# Patient Record
Sex: FEMALE | Race: WHITE | NOT HISPANIC OR LATINO | Employment: OTHER | URBAN - METROPOLITAN AREA
[De-identification: names, ages, dates, MRNs, and addresses within clinical notes are randomized per-mention and may not be internally consistent; named-entity substitution may affect disease eponyms.]

---

## 2017-08-23 ENCOUNTER — OFFICE VISIT (OUTPATIENT)
Dept: PRIMARY CARE CLINIC | Facility: CLINIC | Age: 67
End: 2017-08-23
Payer: COMMERCIAL

## 2017-08-23 VITALS
HEART RATE: 74 BPM | OXYGEN SATURATION: 98 % | BODY MASS INDEX: 16.87 KG/M2 | HEIGHT: 61 IN | TEMPERATURE: 98 F | DIASTOLIC BLOOD PRESSURE: 84 MMHG | WEIGHT: 89.38 LBS | RESPIRATION RATE: 18 BRPM | SYSTOLIC BLOOD PRESSURE: 140 MMHG

## 2017-08-23 DIAGNOSIS — I25.10 CORONARY ARTERY DISEASE, ANGINA PRESENCE UNSPECIFIED, UNSPECIFIED VESSEL OR LESION TYPE, UNSPECIFIED WHETHER NATIVE OR TRANSPLANTED HEART: ICD-10-CM

## 2017-08-23 DIAGNOSIS — J44.9 CHRONIC OBSTRUCTIVE PULMONARY DISEASE, UNSPECIFIED COPD TYPE: ICD-10-CM

## 2017-08-23 DIAGNOSIS — R51.9 HEADACHE, UNSPECIFIED HEADACHE TYPE: ICD-10-CM

## 2017-08-23 DIAGNOSIS — N18.9 CRI (CHRONIC RENAL INSUFFICIENCY), UNSPECIFIED STAGE: ICD-10-CM

## 2017-08-23 DIAGNOSIS — D64.9 ANEMIA, UNSPECIFIED TYPE: Primary | ICD-10-CM

## 2017-08-23 PROCEDURE — 99213 OFFICE O/P EST LOW 20 MIN: CPT | Mod: 25,S$GLB,, | Performed by: INTERNAL MEDICINE

## 2017-08-23 PROCEDURE — 3077F SYST BP >= 140 MM HG: CPT | Mod: S$GLB,,, | Performed by: INTERNAL MEDICINE

## 2017-08-23 PROCEDURE — 1125F AMNT PAIN NOTED PAIN PRSNT: CPT | Mod: S$GLB,,, | Performed by: INTERNAL MEDICINE

## 2017-08-23 PROCEDURE — 96372 THER/PROPH/DIAG INJ SC/IM: CPT | Mod: S$GLB,,, | Performed by: INTERNAL MEDICINE

## 2017-08-23 PROCEDURE — 1159F MED LIST DOCD IN RCRD: CPT | Mod: S$GLB,,, | Performed by: INTERNAL MEDICINE

## 2017-08-23 PROCEDURE — 3079F DIAST BP 80-89 MM HG: CPT | Mod: S$GLB,,, | Performed by: INTERNAL MEDICINE

## 2017-08-23 PROCEDURE — 3008F BODY MASS INDEX DOCD: CPT | Mod: S$GLB,,, | Performed by: INTERNAL MEDICINE

## 2017-08-23 RX ORDER — ATORVASTATIN CALCIUM 20 MG/1
TABLET, FILM COATED ORAL
COMMUNITY
Start: 2017-08-17 | End: 2017-11-21 | Stop reason: SDUPTHER

## 2017-08-23 RX ORDER — CYANOCOBALAMIN 1000 UG/ML
2000 INJECTION, SOLUTION INTRAMUSCULAR; SUBCUTANEOUS ONCE
Status: COMPLETED | OUTPATIENT
Start: 2017-08-23 | End: 2017-08-23

## 2017-08-23 RX ORDER — CYANOCOBALAMIN 1000 UG/ML
2000 INJECTION, SOLUTION INTRAMUSCULAR; SUBCUTANEOUS ONCE
Status: DISCONTINUED | OUTPATIENT
Start: 2017-08-23 | End: 2017-08-23

## 2017-08-23 RX ORDER — HYDRALAZINE HYDROCHLORIDE 100 MG/1
TABLET, FILM COATED ORAL
COMMUNITY
Start: 2017-08-11 | End: 2017-08-23

## 2017-08-23 RX ORDER — PANTOPRAZOLE SODIUM 40 MG/1
TABLET, DELAYED RELEASE ORAL
COMMUNITY
Start: 2017-06-29 | End: 2017-10-02 | Stop reason: SDUPTHER

## 2017-08-23 RX ORDER — METOPROLOL SUCCINATE 25 MG/1
TABLET, EXTENDED RELEASE ORAL
COMMUNITY
Start: 2017-08-08 | End: 2017-11-23 | Stop reason: SDUPTHER

## 2017-08-23 RX ORDER — AMLODIPINE BESYLATE 10 MG/1
TABLET ORAL
COMMUNITY
Start: 2017-08-11 | End: 2017-11-21 | Stop reason: SDUPTHER

## 2017-08-23 RX ORDER — HYDRALAZINE HYDROCHLORIDE 50 MG/1
50 TABLET, FILM COATED ORAL 3 TIMES DAILY
Status: ON HOLD | COMMUNITY
End: 2020-06-15 | Stop reason: HOSPADM

## 2017-08-23 RX ORDER — PAROXETINE HYDROCHLORIDE 20 MG/1
20 TABLET, FILM COATED ORAL EVERY MORNING
Qty: 90 TABLET | Refills: 3 | Status: SHIPPED | OUTPATIENT
Start: 2017-08-23 | End: 2018-08-23 | Stop reason: SDUPTHER

## 2017-08-23 RX ORDER — CLOPIDOGREL BISULFATE 75 MG/1
TABLET ORAL
COMMUNITY
Start: 2017-07-17 | End: 2017-08-23

## 2017-08-23 RX ORDER — BUTALBITAL, ACETAMINOPHEN AND CAFFEINE 50; 325; 40 MG/1; MG/1; MG/1
TABLET ORAL
Refills: 1 | COMMUNITY
Start: 2017-05-26 | End: 2017-11-21

## 2017-08-23 RX ORDER — SUCRALFATE 1 G/1
TABLET ORAL
COMMUNITY
Start: 2017-08-11 | End: 2017-09-21 | Stop reason: SDUPTHER

## 2017-08-23 RX ORDER — CLONIDINE HYDROCHLORIDE 0.1 MG/1
TABLET ORAL
COMMUNITY
Start: 2017-07-24 | End: 2017-11-21 | Stop reason: SDUPTHER

## 2017-08-23 RX ORDER — BUTALBITAL, ACETAMINOPHEN AND CAFFEINE 50; 325; 40 MG/1; MG/1; MG/1
TABLET ORAL
Refills: 1 | Status: CANCELLED | OUTPATIENT
Start: 2017-08-23

## 2017-08-23 RX ADMIN — CYANOCOBALAMIN 2000 MCG: 1000 INJECTION, SOLUTION INTRAMUSCULAR; SUBCUTANEOUS at 05:08

## 2017-08-24 NOTE — PROGRESS NOTES
Subjective:       Patient ID: Radha Jurado is a 67 y.o. female.    Chief Complaint: Hospital Follow Up    HPI  Pt was in hospital last week severe anemia had 4 unit of PRBC was very weak Hct 12%  Thelma now no obvious sorce of blood loss wynne sappt with DR Ramirez [facility] need repeat lab ssee if anemic again still weak not back to herself yet  Review of Systems    Objective:      Physical Exam   Constitutional: She is oriented to person, place, and time. She appears well-developed and well-nourished. No distress.   Skinny thin but ambulating with cane well   HENT:   Head: Normocephalic and atraumatic.   Right Ear: External ear normal.   Left Ear: External ear normal.   Nose: Nose normal.   Mouth/Throat: Oropharynx is clear and moist. No oropharyngeal exudate.   Eyes: Conjunctivae and EOM are normal. Pupils are equal, round, and reactive to light. Right eye exhibits no discharge. Left eye exhibits no discharge.   Neck: Normal range of motion. Neck supple. No thyromegaly present.   Cardiovascular: Normal rate, regular rhythm, normal heart sounds and intact distal pulses.  Exam reveals no gallop and no friction rub.    No murmur heard.  Pulmonary/Chest: Effort normal and breath sounds normal. No respiratory distress. She has no wheezes. She has no rales. She exhibits no tenderness.   Abdominal: Soft. Bowel sounds are normal. She exhibits no distension. There is no tenderness. There is no rebound and no guarding.   Musculoskeletal: Normal range of motion. She exhibits no edema, tenderness or deformity.   Lymphadenopathy:     She has no cervical adenopathy.   Neurological: She is alert and oriented to person, place, and time.   Skin: Skin is warm and dry. Capillary refill takes less than 2 seconds. No rash noted. No erythema.   Psychiatric: She has a normal mood and affect. Judgment and thought content normal.   Nursing note and vitals reviewed.      Assessment:       1. Anemia, unspecified type    2. CRI (chronic renal  insufficiency), unspecified stage    3. Coronary artery disease, angina presence unspecified, unspecified vessel or lesion type, unspecified whether native or transplanted heart    4. Chronic obstructive pulmonary disease, unspecified COPD type    5. Headache, unspecified headache type        Plan:       Anemia, unspecified type  -     CBC auto differential; Future; Expected date: 08/23/2017  -     Ambulatory Referral to Gastroenterology    CRI (chronic renal insufficiency), unspecified stage  -     Comprehensive metabolic panel; Future; Expected date: 08/23/2017    Coronary artery disease, angina presence unspecified, unspecified vessel or lesion type, unspecified whether native or transplanted heart    Chronic obstructive pulmonary disease, unspecified COPD type    Headache, unspecified headache type  -     paroxetine (PAXIL) 20 MG tablet; Take 1 tablet (20 mg total) by mouth every morning.  Dispense: 90 tablet; Refill: 3    Other orders  -     Cancel: butalbital-acetaminophen-caffeine -40 mg (FIORICET, ESGIC) -40 mg per tablet; ; Refill: 1  -     cyanocobalamin injection 2,000 mcg; Inject 2 mLs (2,000 mcg total) into the muscle once.  -     Discontinue: cyanocobalamin injection 2,000 mcg; Inject 2 mLs (2,000 mcg total) into the muscle once.

## 2017-08-31 ENCOUNTER — TELEPHONE (OUTPATIENT)
Dept: PRIMARY CARE CLINIC | Facility: CLINIC | Age: 67
End: 2017-08-31

## 2017-08-31 NOTE — TELEPHONE ENCOUNTER
Attempted to call patient phone rings and rings never answers.. Other number is no longer in service

## 2017-09-05 ENCOUNTER — TELEPHONE (OUTPATIENT)
Dept: PRIMARY CARE CLINIC | Facility: CLINIC | Age: 67
End: 2017-09-05

## 2017-09-22 RX ORDER — SUCRALFATE 1 G/1
TABLET ORAL
Qty: 90 TABLET | Refills: 0 | Status: SHIPPED | OUTPATIENT
Start: 2017-09-22 | End: 2017-11-17 | Stop reason: SDUPTHER

## 2017-10-02 RX ORDER — PANTOPRAZOLE SODIUM 40 MG/1
TABLET, DELAYED RELEASE ORAL
Qty: 90 TABLET | Refills: 3 | Status: SHIPPED | OUTPATIENT
Start: 2017-10-02 | End: 2018-08-23 | Stop reason: SDUPTHER

## 2017-11-19 RX ORDER — SUCRALFATE 1 G/1
TABLET ORAL
Qty: 90 TABLET | Refills: 0 | Status: SHIPPED | OUTPATIENT
Start: 2017-11-19 | End: 2017-11-21 | Stop reason: SDUPTHER

## 2017-11-20 ENCOUNTER — TELEPHONE (OUTPATIENT)
Dept: PRIMARY CARE CLINIC | Facility: CLINIC | Age: 67
End: 2017-11-20

## 2017-11-20 NOTE — TELEPHONE ENCOUNTER
----- Message from Candy Oconnor sent at 11/20/2017 11:31 AM CST -----  Contact: Daughter  Viviana, daughter [phone], Calling for same day appointment, not feeling well. Please advise. Thanks.

## 2017-11-21 ENCOUNTER — OFFICE VISIT (OUTPATIENT)
Dept: PRIMARY CARE CLINIC | Facility: CLINIC | Age: 67
End: 2017-11-21
Payer: COMMERCIAL

## 2017-11-21 VITALS
BODY MASS INDEX: 16.62 KG/M2 | DIASTOLIC BLOOD PRESSURE: 59 MMHG | WEIGHT: 88 LBS | RESPIRATION RATE: 17 BRPM | HEIGHT: 61 IN | HEART RATE: 52 BPM | SYSTOLIC BLOOD PRESSURE: 143 MMHG | OXYGEN SATURATION: 97 %

## 2017-11-21 DIAGNOSIS — R62.7 FAILURE TO THRIVE IN ADULT: ICD-10-CM

## 2017-11-21 DIAGNOSIS — I10 ESSENTIAL HYPERTENSION: ICD-10-CM

## 2017-11-21 DIAGNOSIS — N30.00 ACUTE CYSTITIS WITHOUT HEMATURIA: ICD-10-CM

## 2017-11-21 DIAGNOSIS — J44.9 CHRONIC OBSTRUCTIVE PULMONARY DISEASE, UNSPECIFIED COPD TYPE: ICD-10-CM

## 2017-11-21 DIAGNOSIS — N18.4 CHRONIC RENAL IMPAIRMENT, STAGE 4 (SEVERE): Primary | ICD-10-CM

## 2017-11-21 DIAGNOSIS — G44.209 TENSION-TYPE HEADACHE, NOT INTRACTABLE, UNSPECIFIED CHRONICITY PATTERN: ICD-10-CM

## 2017-11-21 DIAGNOSIS — Z23 NEED FOR IMMUNIZATION AGAINST INFLUENZA: ICD-10-CM

## 2017-11-21 DIAGNOSIS — E78.5 HYPERLIPIDEMIA, UNSPECIFIED HYPERLIPIDEMIA TYPE: ICD-10-CM

## 2017-11-21 DIAGNOSIS — K21.9 GASTROESOPHAGEAL REFLUX DISEASE, ESOPHAGITIS PRESENCE NOT SPECIFIED: ICD-10-CM

## 2017-11-21 PROCEDURE — 96372 THER/PROPH/DIAG INJ SC/IM: CPT | Mod: 59,S$GLB,, | Performed by: INTERNAL MEDICINE

## 2017-11-21 PROCEDURE — 90662 IIV NO PRSV INCREASED AG IM: CPT | Mod: S$GLB,,, | Performed by: INTERNAL MEDICINE

## 2017-11-21 PROCEDURE — 99999 PR PBB SHADOW E&M-EST. PATIENT-LVL III: CPT | Mod: PBBFAC,,, | Performed by: INTERNAL MEDICINE

## 2017-11-21 PROCEDURE — 99213 OFFICE O/P EST LOW 20 MIN: CPT | Mod: 25,S$GLB,, | Performed by: INTERNAL MEDICINE

## 2017-11-21 PROCEDURE — G0008 ADMIN INFLUENZA VIRUS VAC: HCPCS | Mod: S$GLB,,, | Performed by: INTERNAL MEDICINE

## 2017-11-21 RX ORDER — ATORVASTATIN CALCIUM 20 MG/1
20 TABLET, FILM COATED ORAL DAILY
Qty: 90 TABLET | Refills: 3 | Status: SHIPPED | OUTPATIENT
Start: 2017-11-21 | End: 2018-03-07 | Stop reason: SDUPTHER

## 2017-11-21 RX ORDER — AMLODIPINE BESYLATE 10 MG/1
10 TABLET ORAL DAILY
Qty: 90 TABLET | Refills: 3 | Status: SHIPPED | OUTPATIENT
Start: 2017-11-21 | End: 2018-03-07 | Stop reason: SDUPTHER

## 2017-11-21 RX ORDER — CLONIDINE HYDROCHLORIDE 0.1 MG/1
0.1 TABLET ORAL 2 TIMES DAILY
Qty: 180 TABLET | Refills: 3 | Status: SHIPPED | OUTPATIENT
Start: 2017-11-21 | End: 2017-11-23 | Stop reason: SDUPTHER

## 2017-11-21 RX ORDER — SUCRALFATE 1 G/1
TABLET ORAL
Qty: 90 TABLET | Refills: 0 | Status: SHIPPED | OUTPATIENT
Start: 2017-11-21 | End: 2018-03-07 | Stop reason: SDUPTHER

## 2017-11-21 RX ORDER — CYANOCOBALAMIN 1000 UG/ML
2000 INJECTION, SOLUTION INTRAMUSCULAR; SUBCUTANEOUS
Status: COMPLETED | OUTPATIENT
Start: 2017-11-21 | End: 2017-11-21

## 2017-11-21 RX ORDER — CEFTRIAXONE 500 MG/1
500 INJECTION, POWDER, FOR SOLUTION INTRAMUSCULAR; INTRAVENOUS
Status: COMPLETED | OUTPATIENT
Start: 2017-11-21 | End: 2017-11-21

## 2017-11-21 RX ADMIN — CEFTRIAXONE 500 MG: 500 INJECTION, POWDER, FOR SOLUTION INTRAMUSCULAR; INTRAVENOUS at 01:11

## 2017-11-21 RX ADMIN — CYANOCOBALAMIN 2000 MCG: 1000 INJECTION, SOLUTION INTRAMUSCULAR; SUBCUTANEOUS at 01:11

## 2017-11-23 RX ORDER — CIPROFLOXACIN 250 MG/1
250 TABLET, FILM COATED ORAL 2 TIMES DAILY
Qty: 14 TABLET | Refills: 0
Start: 2017-11-23 | End: 2018-02-21 | Stop reason: ALTCHOICE

## 2017-11-23 RX ORDER — BUTALBITAL, ACETAMINOPHEN AND CAFFEINE 50; 325; 40 MG/1; MG/1; MG/1
1 TABLET ORAL EVERY 6 HOURS PRN
Qty: 30 TABLET | Refills: 1
Start: 2017-11-23 | End: 2018-03-07 | Stop reason: SDUPTHER

## 2017-11-23 RX ORDER — CLONIDINE HYDROCHLORIDE 0.1 MG/1
0.1 TABLET ORAL 2 TIMES DAILY
Qty: 180 TABLET | Refills: 3
Start: 2017-11-23 | End: 2018-03-07 | Stop reason: SDUPTHER

## 2017-11-23 RX ORDER — METOPROLOL SUCCINATE 25 MG/1
25 TABLET, EXTENDED RELEASE ORAL DAILY
Qty: 90 TABLET | Refills: 3
Start: 2017-11-23 | End: 2018-03-07 | Stop reason: SDUPTHER

## 2017-11-23 NOTE — PROGRESS NOTES
Subjective:       Patient ID: Radha Jurado is a 67 y.o. female.    Chief Complaint: Annual Exam (no complaints at this time)    HPI    Patient is here for follow-up no clinical symptoms except chronic anxiety and headaches patient is currently follow-up closely wi for chronic renal insufficiency insufficiency near dialysis also weight loss but stable on the and she'll supplement patient had blood tests up frequently at nephrology clinic  Review of Systems    Objective:      Physical Exam   Constitutional: She is oriented to person, place, and time. She appears well-developed and well-nourished. No distress.   Pain and Headache but no distress still very active   HENT:   Head: Normocephalic and atraumatic.   Right Ear: External ear normal.   Left Ear: External ear normal.   Nose: Nose normal.   Mouth/Throat: Oropharynx is clear and moist. No oropharyngeal exudate.   Eyes: Conjunctivae and EOM are normal. Pupils are equal, round, and reactive to light. Right eye exhibits no discharge. Left eye exhibits no discharge.   Neck: Normal range of motion. Neck supple. No thyromegaly present.   Cardiovascular: Normal rate, regular rhythm, normal heart sounds and intact distal pulses.  Exam reveals no gallop and no friction rub.    No murmur heard.  Pulmonary/Chest: Effort normal and breath sounds normal. No respiratory distress. She has no wheezes. She has no rales. She exhibits no tenderness.   Abdominal: Soft. Bowel sounds are normal. She exhibits no distension. There is no tenderness. There is no rebound and no guarding.   Musculoskeletal: Normal range of motion. She exhibits no edema, tenderness or deformity.   Lymphadenopathy:     She has no cervical adenopathy.   Neurological: She is alert and oriented to person, place, and time.   Skin: Skin is warm and dry. Capillary refill takes less than 2 seconds. No rash noted. No erythema.   Psychiatric: She has a normal mood and affect. Judgment and thought content normal.    Nursing note and vitals reviewed.      Assessment:       1. Chronic renal impairment, stage 4 (severe)    2. Acute cystitis without hematuria    3. Tension-type headache, not intractable, unspecified chronicity pattern    4. Chronic obstructive pulmonary disease, unspecified COPD type    5. Essential hypertension    6. Gastroesophageal reflux disease, esophagitis presence not specified    7. Hyperlipidemia, unspecified hyperlipidemia type    8. Need for immunization against influenza    9. Failure to thrive in adult        Plan:       Chronic renal impairment, stage 4 (severe)  -     cyanocobalamin injection 2,000 mcg; Inject 2 mLs (2,000 mcg total) into the muscle one time.    Acute cystitis without hematuria  -     cefTRIAXone injection 500 mg; Inject 0.5 g (500 mg total) into the muscle one time.  -     ciprofloxacin HCl (CIPRO) 250 MG tablet; Take 1 tablet (250 mg total) by mouth 2 (two) times daily.  Dispense: 14 tablet; Refill: 0    Tension-type headache, not intractable, unspecified chronicity pattern  -     Discontinue: cloNIDine (CATAPRES) 0.1 MG tablet; Take 1 tablet (0.1 mg total) by mouth 2 (two) times daily.  Dispense: 180 tablet; Refill: 3  -     butalbital-acetaminophen-caffeine -40 mg (FIORICET, ESGIC) -40 mg per tablet; Take 1 tablet by mouth every 6 (six) hours as needed for Pain.  Dispense: 30 tablet; Refill: 1    Chronic obstructive pulmonary disease, unspecified COPD type  -     X-Ray Chest PA And Lateral; Future; Expected date: 11/23/2017    Essential hypertension  -     amLODIPine (NORVASC) 10 MG tablet; Take 1 tablet (10 mg total) by mouth once daily.  Dispense: 90 tablet; Refill: 3  -     cloNIDine (CATAPRES) 0.1 MG tablet; Take 1 tablet (0.1 mg total) by mouth 2 (two) times daily.  Dispense: 180 tablet; Refill: 3  -     metoprolol succinate (TOPROL-XL) 25 MG 24 hr tablet; Take 1 tablet (25 mg total) by mouth once daily.  Dispense: 90 tablet; Refill: 3  -     CBC auto  differential; Future; Expected date: 11/23/2017  -     Comprehensive metabolic panel; Future; Expected date: 11/23/2017  -     Lipid panel; Future; Expected date: 11/23/2017    Gastroesophageal reflux disease, esophagitis presence not specified  -     sucralfate (CARAFATE) 1 gram tablet; TAKE ONE TABLET BY MOUTH THREE TIMES DAILY BEFORE MEALS AND BEDTIME  Dispense: 90 tablet; Refill: 0    Hyperlipidemia, unspecified hyperlipidemia type  -     atorvastatin (LIPITOR) 20 MG tablet; Take 1 tablet (20 mg total) by mouth once daily.  Dispense: 90 tablet; Refill: 3    Need for immunization against influenza  -     Influenza - High Dose (65+) (PF) (IM)    Failure to thrive in adult  -     TSH; Future; Expected date: 11/23/2017  -     T4, free; Future; Expected date: 11/23/2017

## 2018-02-21 ENCOUNTER — OFFICE VISIT (OUTPATIENT)
Dept: PRIMARY CARE CLINIC | Facility: CLINIC | Age: 68
End: 2018-02-21
Payer: MEDICARE

## 2018-02-21 VITALS
HEIGHT: 61 IN | OXYGEN SATURATION: 91 % | HEART RATE: 62 BPM | SYSTOLIC BLOOD PRESSURE: 102 MMHG | RESPIRATION RATE: 18 BRPM | DIASTOLIC BLOOD PRESSURE: 68 MMHG | BODY MASS INDEX: 17.91 KG/M2 | TEMPERATURE: 98 F | WEIGHT: 94.88 LBS

## 2018-02-21 DIAGNOSIS — J44.9 CHRONIC OBSTRUCTIVE PULMONARY DISEASE, UNSPECIFIED COPD TYPE: ICD-10-CM

## 2018-02-21 DIAGNOSIS — R62.7 FAILURE TO THRIVE IN ADULT: ICD-10-CM

## 2018-02-21 DIAGNOSIS — R23.1 PALE SKIN: ICD-10-CM

## 2018-02-21 DIAGNOSIS — J40 BRONCHITIS: ICD-10-CM

## 2018-02-21 DIAGNOSIS — R55 SYNCOPE, UNSPECIFIED SYNCOPE TYPE: Primary | ICD-10-CM

## 2018-02-21 PROBLEM — R06.02 SOB (SHORTNESS OF BREATH): Status: ACTIVE | Noted: 2018-02-21

## 2018-02-21 PROCEDURE — 99999 PR PBB SHADOW E&M-EST. PATIENT-LVL III: CPT | Mod: PBBFAC,,, | Performed by: INTERNAL MEDICINE

## 2018-02-21 PROCEDURE — 1159F MED LIST DOCD IN RCRD: CPT | Mod: S$GLB,,, | Performed by: INTERNAL MEDICINE

## 2018-02-21 PROCEDURE — 1126F AMNT PAIN NOTED NONE PRSNT: CPT | Mod: S$GLB,,, | Performed by: INTERNAL MEDICINE

## 2018-02-21 PROCEDURE — 99213 OFFICE O/P EST LOW 20 MIN: CPT | Mod: S$GLB,,, | Performed by: INTERNAL MEDICINE

## 2018-02-21 PROCEDURE — 3008F BODY MASS INDEX DOCD: CPT | Mod: S$GLB,,, | Performed by: INTERNAL MEDICINE

## 2018-02-22 PROBLEM — E44.1 MALNUTRITION OF MILD DEGREE: Status: ACTIVE | Noted: 2018-02-22

## 2018-02-22 PROBLEM — J44.1 COPD EXACERBATION: Status: ACTIVE | Noted: 2018-02-21

## 2018-02-22 PROBLEM — E44.1 MALNUTRITION OF MILD DEGREE: Chronic | Status: ACTIVE | Noted: 2018-02-22

## 2018-02-22 NOTE — PROGRESS NOTES
Subjective:       Patient ID: Radha Jurado is a 67 y.o. female.    Chief Complaint: Possible Pneumonia and Extremity Weakness    HPI    Pt c/o weakness and coughing thick yellow sputum and keep passing out at home according to spouse has not had any labs since last visit seen nephrologist told kidney weak near dialyses pt appera pale and weak and wt loss send to ER for further evaluation  Review of Systems    Objective:      Physical Exam   Constitutional: She is oriented to person, place, and time. She appears well-developed and well-nourished. No distress.   Weak thin coughing   HENT:   Head: Normocephalic and atraumatic.   Right Ear: External ear normal.   Left Ear: External ear normal.   Nose: Nose normal.   Mouth/Throat: Oropharynx is clear and moist. No oropharyngeal exudate.   Eyes: Conjunctivae and EOM are normal. Pupils are equal, round, and reactive to light. Right eye exhibits no discharge. Left eye exhibits no discharge.   Neck: Normal range of motion. Neck supple. No thyromegaly present.   Cardiovascular: Normal rate, regular rhythm, normal heart sounds and intact distal pulses.  Exam reveals no gallop and no friction rub.    No murmur heard.  Pulmonary/Chest: Effort normal. No respiratory distress. She has no wheezes. She has rales (crackles LLL). She exhibits no tenderness.   Abdominal: Soft. Bowel sounds are normal. She exhibits no distension. There is no tenderness. There is no rebound and no guarding.   Musculoskeletal: Normal range of motion. She exhibits no edema, tenderness or deformity.   Lymphadenopathy:     She has no cervical adenopathy.   Neurological: She is alert and oriented to person, place, and time.   Skin: Skin is warm and dry. Capillary refill takes less than 2 seconds. No rash noted. No erythema. There is pallor.   pale   Psychiatric: She has a normal mood and affect. Judgment and thought content normal.   Nursing note and vitals reviewed.      Assessment:       1. Syncope,  unspecified syncope type    2. Bronchitis    3. Chronic obstructive pulmonary disease, unspecified COPD type    4. Failure to thrive in adult    5. Pale skin        Plan:       Syncope, unspecified syncope type  Comments:  send to ERfor further evaluation severe anemia recurrent syncopy pneumonia    Bronchitis    Chronic obstructive pulmonary disease, unspecified COPD type    Failure to thrive in adult    Pale skin

## 2018-03-07 ENCOUNTER — OFFICE VISIT (OUTPATIENT)
Dept: PRIMARY CARE CLINIC | Facility: CLINIC | Age: 68
End: 2018-03-07
Payer: MEDICARE

## 2018-03-07 VITALS
OXYGEN SATURATION: 98 % | DIASTOLIC BLOOD PRESSURE: 77 MMHG | BODY MASS INDEX: 18.06 KG/M2 | SYSTOLIC BLOOD PRESSURE: 217 MMHG | WEIGHT: 95.63 LBS | HEART RATE: 62 BPM | HEIGHT: 61 IN | RESPIRATION RATE: 18 BRPM | TEMPERATURE: 98 F

## 2018-03-07 DIAGNOSIS — K21.9 GASTROESOPHAGEAL REFLUX DISEASE, ESOPHAGITIS PRESENCE NOT SPECIFIED: ICD-10-CM

## 2018-03-07 DIAGNOSIS — N18.30 CHRONIC KIDNEY DISEASE (CKD), STAGE III (MODERATE): Primary | ICD-10-CM

## 2018-03-07 DIAGNOSIS — I10 ESSENTIAL HYPERTENSION: ICD-10-CM

## 2018-03-07 DIAGNOSIS — R51.9 NONINTRACTABLE HEADACHE, UNSPECIFIED CHRONICITY PATTERN, UNSPECIFIED HEADACHE TYPE: ICD-10-CM

## 2018-03-07 DIAGNOSIS — G44.209 TENSION-TYPE HEADACHE, NOT INTRACTABLE, UNSPECIFIED CHRONICITY PATTERN: ICD-10-CM

## 2018-03-07 DIAGNOSIS — E78.5 HYPERLIPIDEMIA, UNSPECIFIED HYPERLIPIDEMIA TYPE: ICD-10-CM

## 2018-03-07 PROCEDURE — 99213 OFFICE O/P EST LOW 20 MIN: CPT | Mod: S$GLB,,, | Performed by: INTERNAL MEDICINE

## 2018-03-07 PROCEDURE — 99999 PR PBB SHADOW E&M-EST. PATIENT-LVL IV: CPT | Mod: PBBFAC,,, | Performed by: INTERNAL MEDICINE

## 2018-03-07 RX ORDER — AMLODIPINE BESYLATE 10 MG/1
10 TABLET ORAL DAILY
Qty: 90 TABLET | Refills: 3 | Status: SHIPPED | OUTPATIENT
Start: 2018-03-07 | End: 2019-02-14

## 2018-03-07 RX ORDER — CLONIDINE HYDROCHLORIDE 0.1 MG/1
0.1 TABLET ORAL 2 TIMES DAILY
Qty: 180 TABLET | Refills: 3 | Status: ON HOLD | OUTPATIENT
Start: 2018-03-07 | End: 2018-08-27 | Stop reason: HOSPADM

## 2018-03-07 RX ORDER — SUCRALFATE 1 G/1
TABLET ORAL
Qty: 90 TABLET | Refills: 3 | Status: SHIPPED | OUTPATIENT
Start: 2018-03-07 | End: 2019-04-10 | Stop reason: SDUPTHER

## 2018-03-07 RX ORDER — ATORVASTATIN CALCIUM 20 MG/1
20 TABLET, FILM COATED ORAL DAILY
Qty: 90 TABLET | Refills: 3 | Status: SHIPPED | OUTPATIENT
Start: 2018-03-07 | End: 2018-08-23 | Stop reason: SDUPTHER

## 2018-03-07 RX ORDER — BUTALBITAL, ACETAMINOPHEN AND CAFFEINE 50; 325; 40 MG/1; MG/1; MG/1
1 TABLET ORAL EVERY 6 HOURS PRN
Qty: 30 TABLET | Refills: 1 | Status: SHIPPED | OUTPATIENT
Start: 2018-03-07 | End: 2018-05-23 | Stop reason: SDUPTHER

## 2018-03-07 RX ORDER — METOPROLOL SUCCINATE 25 MG/1
25 TABLET, EXTENDED RELEASE ORAL DAILY
Qty: 90 TABLET | Refills: 3 | Status: ON HOLD | OUTPATIENT
Start: 2018-03-07 | End: 2018-08-27 | Stop reason: HOSPADM

## 2018-03-08 ENCOUNTER — TELEPHONE (OUTPATIENT)
Dept: PRIMARY CARE CLINIC | Facility: CLINIC | Age: 68
End: 2018-03-08

## 2018-03-08 DIAGNOSIS — N18.5 CKD (CHRONIC KIDNEY DISEASE), STAGE V: Primary | ICD-10-CM

## 2018-03-08 NOTE — PROGRESS NOTES
Patient, Radha Jurado (MRN #[id number]), presented with a recent estimated Glumerular Filtration Rate (eGFR) less than 15 consistent with the definition of chronic kidney disease stage 5 (ICD10 - N18.5).    eGFR if non    Date Value Ref Range Status   02/22/2018 14.8 (A) >60 mL/min/1.73 m^2 Final     Comment:     Calculation used to obtain the estimated glomerular filtration  rate (eGFR) is the CKD-EPI equation.          The patient's chronic kidney disease stage 5 was monitored, evaluated, addressed and/or treated. This addendum to the medical record is made on 03/08/2018.

## 2018-03-08 NOTE — PROGRESS NOTES
Subjective:       Patient ID: Radha Jurado is a 67 y.o. female.    Chief Complaint: Hospital Follow Up    HPI  Pt was in hospital for 1 day for dehydration IVF told kidney getting worse need see nephrologist Dr Renteria don't take [employer] doing ok now no sob cp still urinating well need refill meds  Review of Systems    Objective:      Physical Exam   Constitutional: She is oriented to person, place, and time. She appears well-developed and well-nourished. No distress.   HENT:   Head: Normocephalic and atraumatic.   Right Ear: External ear normal.   Left Ear: External ear normal.   Nose: Nose normal.   Mouth/Throat: Oropharynx is clear and moist. No oropharyngeal exudate.   Eyes: Conjunctivae and EOM are normal. Pupils are equal, round, and reactive to light. Right eye exhibits no discharge. Left eye exhibits no discharge.   Neck: Normal range of motion. Neck supple. No thyromegaly present.   Cardiovascular: Normal rate, regular rhythm, normal heart sounds and intact distal pulses.  Exam reveals no gallop and no friction rub.    No murmur heard.  Pulmonary/Chest: Effort normal and breath sounds normal. No respiratory distress. She has no wheezes. She has no rales. She exhibits no tenderness.   Abdominal: Soft. Bowel sounds are normal. She exhibits no distension. There is no tenderness. There is no rebound and no guarding.   Musculoskeletal: Normal range of motion. She exhibits no edema, tenderness or deformity.   Lymphadenopathy:     She has no cervical adenopathy.   Neurological: She is alert and oriented to person, place, and time.   Skin: Skin is warm and dry. Capillary refill takes less than 2 seconds. No rash noted. No erythema.   Psychiatric: She has a normal mood and affect. Judgment and thought content normal.   Nursing note and vitals reviewed.      Assessment:       1. Chronic kidney disease (CKD), stage III (moderate)    2. Essential hypertension    3. Hyperlipidemia, unspecified hyperlipidemia  type    4. Tension-type headache, not intractable, unspecified chronicity pattern    5. Gastroesophageal reflux disease, esophagitis presence not specified    6. Nonintractable headache, unspecified chronicity pattern, unspecified headache type        Plan:       Chronic kidney disease (CKD), stage III (moderate)  -     Comprehensive metabolic panel; Future; Expected date: 03/07/2018    Essential hypertension  -     amLODIPine (NORVASC) 10 MG tablet; Take 1 tablet (10 mg total) by mouth once daily.  Dispense: 90 tablet; Refill: 3  -     metoprolol succinate (TOPROL-XL) 25 MG 24 hr tablet; Take 1 tablet (25 mg total) by mouth once daily.  Dispense: 90 tablet; Refill: 3  -     cloNIDine (CATAPRES) 0.1 MG tablet; Take 1 tablet (0.1 mg total) by mouth 2 (two) times daily.  Dispense: 180 tablet; Refill: 3  -     CBC auto differential; Future; Expected date: 03/07/2018    Hyperlipidemia, unspecified hyperlipidemia type  -     atorvastatin (LIPITOR) 20 MG tablet; Take 1 tablet (20 mg total) by mouth once daily.  Dispense: 90 tablet; Refill: 3    Tension-type headache, not intractable, unspecified chronicity pattern  -     butalbital-acetaminophen-caffeine -40 mg (FIORICET, ESGIC) -40 mg per tablet; Take 1 tablet by mouth every 6 (six) hours as needed for Pain.  Dispense: 30 tablet; Refill: 1    Gastroesophageal reflux disease, esophagitis presence not specified  -     sucralfate (CARAFATE) 1 gram tablet; TAKE ONE TABLET BY MOUTH THREE TIMES DAILY BEFORE MEALS AND BEDTIME  Dispense: 90 tablet; Refill: 3    Nonintractable headache, unspecified chronicity pattern, unspecified headache type

## 2018-03-08 NOTE — TELEPHONE ENCOUNTER
"Patient states "dr. Carpio was suppose to give me a referral for a kidney doctor here at [facility]." Verbalized to patient that we don't have nephrology here. Patient would like to see a Nephrologist at [facility] in [place]. Unable to find one. General referral placed. Pended to PCP.   "

## 2018-03-11 NOTE — TELEPHONE ENCOUNTER
Josh smith pt to any nephrologist who take [facility] seen Dr Ruby in hospital but don't take [facility]

## 2018-03-14 PROBLEM — N18.5 CKD (CHRONIC KIDNEY DISEASE), STAGE V: Status: ACTIVE | Noted: 2018-03-14

## 2018-03-22 ENCOUNTER — TELEPHONE (OUTPATIENT)
Dept: PRIMARY CARE CLINIC | Facility: CLINIC | Age: 68
End: 2018-03-22

## 2018-03-22 NOTE — TELEPHONE ENCOUNTER
----- Message from Paolo CARMEL Dietrich sent at 3/22/2018 12:47 PM CDT -----  Contact: Elvi/Dr. Ryan Boles called in regarding the attached patient and stated they received a referral from Dr. Carpio with no current lab report/results.  Fax number is [phone] and call back number is [phone]

## 2018-03-29 ENCOUNTER — TELEPHONE (OUTPATIENT)
Dept: PRIMARY CARE CLINIC | Facility: CLINIC | Age: 68
End: 2018-03-29

## 2018-03-29 NOTE — TELEPHONE ENCOUNTER
----- Message from Candy Oconnor sent at 3/29/2018  8:56 AM CDT -----  Contact: Elvi with Dr. Mann phone [phone] fax [phone]  Elvi with Dr. Mann phone [phone] fax [phone], Patient was referred to them, they needs copies of labs. Will not schedule patient until they receive all the records. Please fax. Thanks.

## 2018-04-11 ENCOUNTER — TELEPHONE (OUTPATIENT)
Dept: PRIMARY CARE CLINIC | Facility: CLINIC | Age: 68
End: 2018-04-11

## 2018-04-11 DIAGNOSIS — N18.30 STAGE 3 CHRONIC KIDNEY DISEASE: Primary | ICD-10-CM

## 2018-04-11 NOTE — TELEPHONE ENCOUNTER
Kristin with Dr. Mann's office is requesting for the referral to be changed from Dr. Hubbard to Dr. Mann's. Referral placed and signed per PCP.

## 2018-04-11 NOTE — TELEPHONE ENCOUNTER
----- Message from Marianna Sterling sent at 4/11/2018  8:51 AM CDT -----  Contact: Kristin with Dr. Mann's office [phone]  Kristin with Dr. Mann's office phone [phone], fax [phone] calling because a referral was placed to Dr. Aguilar, but patient will be seeing Dr. Mann and they will need a new referral faxed over. Kristin is requesting more than one visit for the referral. Please advise. Thanks!

## 2018-05-23 ENCOUNTER — CLINICAL SUPPORT (OUTPATIENT)
Dept: PRIMARY CARE CLINIC | Facility: CLINIC | Age: 68
End: 2018-05-23
Payer: MEDICARE

## 2018-05-23 ENCOUNTER — OFFICE VISIT (OUTPATIENT)
Dept: PRIMARY CARE CLINIC | Facility: CLINIC | Age: 68
End: 2018-05-23
Payer: MEDICARE

## 2018-05-23 VITALS
BODY MASS INDEX: 18.39 KG/M2 | HEIGHT: 61 IN | RESPIRATION RATE: 18 BRPM | DIASTOLIC BLOOD PRESSURE: 70 MMHG | TEMPERATURE: 98 F | OXYGEN SATURATION: 96 % | HEART RATE: 53 BPM | WEIGHT: 97.38 LBS | SYSTOLIC BLOOD PRESSURE: 143 MMHG

## 2018-05-23 DIAGNOSIS — J44.9 CHRONIC OBSTRUCTIVE PULMONARY DISEASE, UNSPECIFIED COPD TYPE: ICD-10-CM

## 2018-05-23 DIAGNOSIS — N18.4 CHRONIC RENAL FAILURE SYNDROME, STAGE 4 (SEVERE): ICD-10-CM

## 2018-05-23 DIAGNOSIS — G44.209 TENSION-TYPE HEADACHE, NOT INTRACTABLE, UNSPECIFIED CHRONICITY PATTERN: ICD-10-CM

## 2018-05-23 DIAGNOSIS — R53.83 FATIGUE, UNSPECIFIED TYPE: ICD-10-CM

## 2018-05-23 DIAGNOSIS — E78.5 HYPERLIPIDEMIA, UNSPECIFIED HYPERLIPIDEMIA TYPE: ICD-10-CM

## 2018-05-23 DIAGNOSIS — R63.4 WEIGHT LOSS: ICD-10-CM

## 2018-05-23 DIAGNOSIS — R63.0 LOSS OF APPETITE: Primary | ICD-10-CM

## 2018-05-23 DIAGNOSIS — Z12.31 ENCOUNTER FOR SCREENING MAMMOGRAM FOR BREAST CANCER: ICD-10-CM

## 2018-05-23 LAB
ALBUMIN SERPL BCP-MCNC: 3.7 G/DL
ALP SERPL-CCNC: 92 U/L
ALT SERPL W/O P-5'-P-CCNC: 20 U/L
ANION GAP SERPL CALC-SCNC: 10 MMOL/L
AST SERPL-CCNC: 22 U/L
BASOPHILS # BLD AUTO: 0 K/UL
BASOPHILS NFR BLD: 0.2 %
BILIRUB SERPL-MCNC: 0.5 MG/DL
BUN SERPL-MCNC: 67 MG/DL
CALCIUM SERPL-MCNC: 8.9 MG/DL
CHLORIDE SERPL-SCNC: 108 MMOL/L
CO2 SERPL-SCNC: 19 MMOL/L
CREAT SERPL-MCNC: 3.4 MG/DL
DIFFERENTIAL METHOD: ABNORMAL
EOSINOPHIL # BLD AUTO: 0.1 K/UL
EOSINOPHIL NFR BLD: 1.6 %
ERYTHROCYTE [DISTWIDTH] IN BLOOD BY AUTOMATED COUNT: 15.2 %
EST. GFR  (AFRICAN AMERICAN): 15.1 ML/MIN/1.73 M^2
EST. GFR  (NON AFRICAN AMERICAN): 13.1 ML/MIN/1.73 M^2
GLUCOSE SERPL-MCNC: 87 MG/DL
HCT VFR BLD AUTO: 39.2 %
HGB BLD-MCNC: 12.9 G/DL
LYMPHOCYTES # BLD AUTO: 1.7 K/UL
LYMPHOCYTES NFR BLD: 21 %
MCH RBC QN AUTO: 31.7 PG
MCHC RBC AUTO-ENTMCNC: 33 G/DL
MCV RBC AUTO: 96 FL
MONOCYTES # BLD AUTO: 0.6 K/UL
MONOCYTES NFR BLD: 7 %
NEUTROPHILS # BLD AUTO: 5.7 K/UL
NEUTROPHILS NFR BLD: 70.2 %
PLATELET # BLD AUTO: 174 K/UL
PMV BLD AUTO: 9.5 FL
POTASSIUM SERPL-SCNC: 4.5 MMOL/L
PROT SERPL-MCNC: 7.5 G/DL
RBC # BLD AUTO: 4.08 M/UL
SODIUM SERPL-SCNC: 137 MMOL/L
WBC # BLD AUTO: 8.1 K/UL

## 2018-05-23 PROCEDURE — 99999 PR PBB SHADOW E&M-EST. PATIENT-LVL II: CPT | Mod: PBBFAC,,,

## 2018-05-23 PROCEDURE — 99213 OFFICE O/P EST LOW 20 MIN: CPT | Mod: 25,S$GLB,, | Performed by: INTERNAL MEDICINE

## 2018-05-23 PROCEDURE — 3078F DIAST BP <80 MM HG: CPT | Mod: S$GLB,,, | Performed by: INTERNAL MEDICINE

## 2018-05-23 PROCEDURE — 96372 THER/PROPH/DIAG INJ SC/IM: CPT | Mod: S$GLB,,, | Performed by: INTERNAL MEDICINE

## 2018-05-23 PROCEDURE — 3077F SYST BP >= 140 MM HG: CPT | Mod: S$GLB,,, | Performed by: INTERNAL MEDICINE

## 2018-05-23 PROCEDURE — 99999 PR PBB SHADOW E&M-EST. PATIENT-LVL IV: CPT | Mod: PBBFAC,,, | Performed by: INTERNAL MEDICINE

## 2018-05-23 RX ORDER — CYANOCOBALAMIN 1000 UG/ML
2000 INJECTION, SOLUTION INTRAMUSCULAR; SUBCUTANEOUS
Status: COMPLETED | OUTPATIENT
Start: 2018-05-23 | End: 2018-05-23

## 2018-05-23 RX ORDER — BUTALBITAL, ACETAMINOPHEN AND CAFFEINE 50; 325; 40 MG/1; MG/1; MG/1
1 TABLET ORAL EVERY 6 HOURS PRN
Qty: 30 TABLET | Refills: 1 | Status: SHIPPED | OUTPATIENT
Start: 2018-05-23 | End: 2018-05-23 | Stop reason: SDUPTHER

## 2018-05-23 RX ORDER — ATORVASTATIN CALCIUM 20 MG/1
20 TABLET, FILM COATED ORAL DAILY
Qty: 90 TABLET | Refills: 3 | Status: CANCELLED | OUTPATIENT
Start: 2018-05-23 | End: 2019-05-23

## 2018-05-23 RX ORDER — IPRATROPIUM BROMIDE AND ALBUTEROL SULFATE 2.5; .5 MG/3ML; MG/3ML
3 SOLUTION RESPIRATORY (INHALATION)
Qty: 1 BOX | Refills: 0 | Status: SHIPPED | OUTPATIENT
Start: 2018-05-23 | End: 2018-05-23 | Stop reason: SDUPTHER

## 2018-05-23 RX ORDER — BUTALBITAL, ACETAMINOPHEN AND CAFFEINE 50; 325; 40 MG/1; MG/1; MG/1
1 TABLET ORAL EVERY 6 HOURS PRN
Qty: 30 TABLET | Refills: 1 | Status: SHIPPED | OUTPATIENT
Start: 2018-05-23 | End: 2018-08-23 | Stop reason: SDUPTHER

## 2018-05-23 RX ORDER — CYPROHEPTADINE HYDROCHLORIDE 4 MG/1
4 TABLET ORAL 2 TIMES DAILY
Qty: 60 TABLET | Refills: 1 | Status: SHIPPED | OUTPATIENT
Start: 2018-05-23

## 2018-05-23 RX ORDER — IPRATROPIUM BROMIDE AND ALBUTEROL SULFATE 2.5; .5 MG/3ML; MG/3ML
3 SOLUTION RESPIRATORY (INHALATION)
Qty: 1 BOX | Refills: 5 | Status: SHIPPED | OUTPATIENT
Start: 2018-05-23 | End: 2020-06-19

## 2018-05-23 RX ADMIN — CYANOCOBALAMIN 2000 MCG: 1000 INJECTION, SOLUTION INTRAMUSCULAR; SUBCUTANEOUS at 03:05

## 2018-05-23 NOTE — PROGRESS NOTES
Pt ID verified by  and Name. Allergies verified. B12 2cc administered IM to R VG per MD order. No adverse reactions noted. Pt tolerated well.

## 2018-05-23 NOTE — PROGRESS NOTES
Subjective:       Patient ID: Radha Jurado is a 68 y.o. female.    Chief Complaint: Medication Refill    HPI   Pt c/o no appetide see nephrologist plan for AV shunt get ready for dialyses in future no sob cp has wt loss and fatigue  Review of Systems   And c/o haedache chronic and asthma copd need sol for aresol tx  Objective:      Physical Exam   Constitutional: She is oriented to person, place, and time. She appears well-developed and well-nourished. No distress.   Thin skinny   HENT:   Head: Normocephalic and atraumatic.   Right Ear: External ear normal.   Left Ear: External ear normal.   Nose: Nose normal.   Mouth/Throat: Oropharynx is clear and moist. No oropharyngeal exudate.   Eyes: Conjunctivae and EOM are normal. Pupils are equal, round, and reactive to light. Right eye exhibits no discharge. Left eye exhibits no discharge.   Neck: Normal range of motion. Neck supple. No thyromegaly present.   Cardiovascular: Normal rate, regular rhythm, normal heart sounds and intact distal pulses.  Exam reveals no gallop and no friction rub.    No murmur heard.  Pulmonary/Chest: Effort normal and breath sounds normal. No respiratory distress. She has no wheezes. She has no rales. She exhibits no tenderness.   Abdominal: Soft. Bowel sounds are normal. She exhibits no distension. There is no tenderness. There is no rebound and no guarding.   Musculoskeletal: Normal range of motion. She exhibits no edema, tenderness or deformity.   Lymphadenopathy:     She has no cervical adenopathy.   Neurological: She is alert and oriented to person, place, and time.   Skin: Skin is warm and dry. Capillary refill takes less than 2 seconds. No rash noted. No erythema.   Psychiatric: She has a normal mood and affect. Judgment and thought content normal.   Nursing note and vitals reviewed.      Assessment:       1. Loss of appetite    2. Tension-type headache, not intractable, unspecified chronicity pattern    3. Hyperlipidemia,  unspecified hyperlipidemia type    4. Weight loss    5. Chronic renal failure syndrome, stage 4 (severe)    6. Fatigue, unspecified type    7. Chronic obstructive pulmonary disease, unspecified COPD type    8. Encounter for screening mammogram for breast cancer        Plan:       Loss of appetite  -     cyproheptadine (PERIACTIN) 4 mg tablet; Take 1 tablet (4 mg total) by mouth 2 (two) times daily.  Dispense: 60 tablet; Refill: 1    Tension-type headache, not intractable, unspecified chronicity pattern  -     Discontinue: butalbital-acetaminophen-caffeine -40 mg (FIORICET, ESGIC) -40 mg per tablet; Take 1 tablet by mouth every 6 (six) hours as needed for Pain.  Dispense: 30 tablet; Refill: 1  -     butalbital-acetaminophen-caffeine -40 mg (FIORICET, ESGIC) -40 mg per tablet; Take 1 tablet by mouth every 6 (six) hours as needed for Pain.  Dispense: 30 tablet; Refill: 1    Hyperlipidemia, unspecified hyperlipidemia type    Weight loss  -     cyanocobalamin injection 2,000 mcg; Inject 2 mLs (2,000 mcg total) into the muscle one time.    Chronic renal failure syndrome, stage 4 (severe)  -     Comprehensive metabolic panel; Future; Expected date: 05/23/2018    Fatigue, unspecified type  -     CBC auto differential; Future; Expected date: 05/23/2018  -     POCT URINE DIPSTICK WITHOUT MICROSCOPE    Chronic obstructive pulmonary disease, unspecified COPD type  -     Discontinue: albuterol-ipratropium (DUO-NEB) 2.5 mg-0.5 mg/3 mL nebulizer solution; Take 3 mLs by nebulization every 6 (six) hours while awake. Rescue  Dispense: 1 Box; Refill: 0  -     albuterol-ipratropium (DUO-NEB) 2.5 mg-0.5 mg/3 mL nebulizer solution; Take 3 mLs by nebulization every 6 (six) hours while awake. Rescue  Dispense: 1 Box; Refill: 5    Encounter for screening mammogram for breast cancer  -     Mammo Digital Screening Bilat without CA; Future; Expected date: 06/06/2018    Other orders  -     Cancel: atorvastatin (LIPITOR)  20 MG tablet; Take 1 tablet (20 mg total) by mouth once daily.  Dispense: 90 tablet; Refill: 3

## 2018-05-25 ENCOUNTER — TELEPHONE (OUTPATIENT)
Dept: PRIMARY CARE CLINIC | Facility: CLINIC | Age: 68
End: 2018-05-25

## 2018-05-25 DIAGNOSIS — N28.9 KIDNEY DISEASE: Primary | ICD-10-CM

## 2018-07-09 RX ORDER — ISOSORBIDE DINITRATE 30 MG/1
TABLET ORAL
Qty: 90 TABLET | Refills: 3 | Status: SHIPPED | OUTPATIENT
Start: 2018-07-09 | End: 2018-09-06

## 2018-08-23 ENCOUNTER — OFFICE VISIT (OUTPATIENT)
Dept: PRIMARY CARE CLINIC | Facility: CLINIC | Age: 68
End: 2018-08-23
Payer: MEDICARE

## 2018-08-23 VITALS
WEIGHT: 98.13 LBS | HEART RATE: 60 BPM | BODY MASS INDEX: 18.53 KG/M2 | SYSTOLIC BLOOD PRESSURE: 119 MMHG | OXYGEN SATURATION: 95 % | DIASTOLIC BLOOD PRESSURE: 62 MMHG | RESPIRATION RATE: 18 BRPM | HEIGHT: 61 IN | TEMPERATURE: 99 F

## 2018-08-23 DIAGNOSIS — E78.5 HYPERLIPIDEMIA, UNSPECIFIED HYPERLIPIDEMIA TYPE: ICD-10-CM

## 2018-08-23 DIAGNOSIS — N18.5 CKD (CHRONIC KIDNEY DISEASE), STAGE V: ICD-10-CM

## 2018-08-23 DIAGNOSIS — G44.209 TENSION-TYPE HEADACHE, NOT INTRACTABLE, UNSPECIFIED CHRONICITY PATTERN: ICD-10-CM

## 2018-08-23 DIAGNOSIS — K21.9 GASTROESOPHAGEAL REFLUX DISEASE, ESOPHAGITIS PRESENCE NOT SPECIFIED: ICD-10-CM

## 2018-08-23 DIAGNOSIS — R53.1 WEAKNESS: ICD-10-CM

## 2018-08-23 DIAGNOSIS — R51.9 HEADACHE, UNSPECIFIED HEADACHE TYPE: ICD-10-CM

## 2018-08-23 DIAGNOSIS — R00.1 BRADYCARDIA: Primary | ICD-10-CM

## 2018-08-23 PROCEDURE — 3078F DIAST BP <80 MM HG: CPT | Mod: S$GLB,,, | Performed by: INTERNAL MEDICINE

## 2018-08-23 PROCEDURE — 99999 PR PBB SHADOW E&M-EST. PATIENT-LVL IV: CPT | Mod: PBBFAC,,, | Performed by: INTERNAL MEDICINE

## 2018-08-23 PROCEDURE — 3074F SYST BP LT 130 MM HG: CPT | Mod: S$GLB,,, | Performed by: INTERNAL MEDICINE

## 2018-08-23 PROCEDURE — 99213 OFFICE O/P EST LOW 20 MIN: CPT | Mod: S$GLB,,, | Performed by: INTERNAL MEDICINE

## 2018-08-23 RX ORDER — BUTALBITAL, ACETAMINOPHEN AND CAFFEINE 50; 325; 40 MG/1; MG/1; MG/1
1 TABLET ORAL EVERY 6 HOURS PRN
Qty: 30 TABLET | Refills: 0 | Status: SHIPPED | OUTPATIENT
Start: 2018-08-23 | End: 2018-09-06 | Stop reason: SDUPTHER

## 2018-08-23 RX ORDER — PANTOPRAZOLE SODIUM 40 MG/1
40 TABLET, DELAYED RELEASE ORAL DAILY
Qty: 90 TABLET | Refills: 3 | Status: SHIPPED | OUTPATIENT
Start: 2018-08-23 | End: 2018-11-09 | Stop reason: SDUPTHER

## 2018-08-23 RX ORDER — ATORVASTATIN CALCIUM 20 MG/1
20 TABLET, FILM COATED ORAL DAILY
Qty: 90 TABLET | Refills: 3 | Status: SHIPPED | OUTPATIENT
Start: 2018-08-23 | End: 2019-06-27 | Stop reason: SDUPTHER

## 2018-08-23 RX ORDER — PAROXETINE HYDROCHLORIDE 20 MG/1
20 TABLET, FILM COATED ORAL EVERY MORNING
Qty: 90 TABLET | Refills: 3 | Status: SHIPPED | OUTPATIENT
Start: 2018-08-23 | End: 2019-06-21 | Stop reason: SDUPTHER

## 2018-08-24 PROBLEM — I44.1 MOBITZ TYPE 2 SECOND DEGREE ATRIOVENTRICULAR BLOCK: Status: ACTIVE | Noted: 2018-08-24

## 2018-08-24 NOTE — PROGRESS NOTES
Subjective:       Patient ID: Radha Jurado is a 68 y.o. female.    Chief Complaint: Extremity Weakness and Abdominal Pain    HPI  Pt c/o tired fatigue x 3 days and sob no cp had AV shunt left wrist but no dialyses yet no fever chill but has diffused abd pian with nausea  Review of Systems    Objective:      Physical Exam   Constitutional: She is oriented to person, place, and time. She appears well-developed and well-nourished. No distress.   HENT:   Head: Normocephalic and atraumatic.   Right Ear: External ear normal.   Left Ear: External ear normal.   Nose: Nose normal.   Mouth/Throat: Oropharynx is clear and moist. No oropharyngeal exudate.   Eyes: Conjunctivae and EOM are normal. Pupils are equal, round, and reactive to light. Right eye exhibits no discharge. Left eye exhibits no discharge.   Neck: Normal range of motion. Neck supple. No thyromegaly present.   Cardiovascular: Normal rate, regular rhythm, normal heart sounds and intact distal pulses. Exam reveals no gallop and no friction rub.   No murmur heard.  Pulmonary/Chest: Effort normal and breath sounds normal. No respiratory distress. She has no wheezes. She has no rales. She exhibits no tenderness.   Abdominal: Soft. Bowel sounds are normal. She exhibits no distension. There is tenderness (diffused tenderness with palpation). There is no rebound and no guarding.   Musculoskeletal: Normal range of motion. She exhibits no edema, tenderness or deformity.   Lymphadenopathy:     She has no cervical adenopathy.   Neurological: She is alert and oriented to person, place, and time.   Skin: Skin is warm and dry. Capillary refill takes less than 2 seconds. No rash noted. No erythema.   Psychiatric: She has a normal mood and affect. Judgment and thought content normal.   Nursing note and vitals reviewed.      Assessment:       1. Bradycardia    2. Tension-type headache, not intractable, unspecified chronicity pattern    3. Headache, unspecified headache type     4. Hyperlipidemia, unspecified hyperlipidemia type    5. Weakness    6. CKD (chronic kidney disease), stage V    7. Gastroesophageal reflux disease, esophagitis presence not specified        Plan:       Bradycardia  Comments:  EKG in office HR 30/minute send pt to ER with wheel chair    Tension-type headache, not intractable, unspecified chronicity pattern  -     butalbital-acetaminophen-caffeine -40 mg (FIORICET, ESGIC) -40 mg per tablet; Take 1 tablet by mouth every 6 (six) hours as needed for Pain.  Dispense: 30 tablet; Refill: 0    Headache, unspecified headache type  -     paroxetine (PAXIL) 20 MG tablet; Take 1 tablet (20 mg total) by mouth every morning.  Dispense: 90 tablet; Refill: 3    Hyperlipidemia, unspecified hyperlipidemia type  -     atorvastatin (LIPITOR) 20 MG tablet; Take 1 tablet (20 mg total) by mouth once daily.  Dispense: 90 tablet; Refill: 3    Weakness    CKD (chronic kidney disease), stage V    Gastroesophageal reflux disease, esophagitis presence not specified  -     pantoprazole (PROTONIX) 40 MG tablet; Take 1 tablet (40 mg total) by mouth once daily.  Dispense: 90 tablet; Refill: 3

## 2018-08-27 PROBLEM — I44.1 MOBITZ TYPE 2 SECOND DEGREE ATRIOVENTRICULAR BLOCK: Status: RESOLVED | Noted: 2018-08-24 | Resolved: 2018-08-27

## 2018-08-27 PROBLEM — R00.1 SYMPTOMATIC BRADYCARDIA: Status: RESOLVED | Noted: 2018-08-23 | Resolved: 2018-08-27

## 2018-09-06 ENCOUNTER — TELEPHONE (OUTPATIENT)
Dept: PRIMARY CARE CLINIC | Facility: CLINIC | Age: 68
End: 2018-09-06

## 2018-09-06 ENCOUNTER — OFFICE VISIT (OUTPATIENT)
Dept: PRIMARY CARE CLINIC | Facility: CLINIC | Age: 68
End: 2018-09-06
Payer: MEDICARE

## 2018-09-06 VITALS
DIASTOLIC BLOOD PRESSURE: 69 MMHG | BODY MASS INDEX: 18.12 KG/M2 | HEIGHT: 61 IN | TEMPERATURE: 98 F | RESPIRATION RATE: 18 BRPM | OXYGEN SATURATION: 99 % | HEART RATE: 69 BPM | SYSTOLIC BLOOD PRESSURE: 150 MMHG | WEIGHT: 96 LBS

## 2018-09-06 DIAGNOSIS — R10.30 LOWER ABDOMINAL PAIN: ICD-10-CM

## 2018-09-06 DIAGNOSIS — R51.9 HEADACHE, UNSPECIFIED HEADACHE TYPE: Primary | ICD-10-CM

## 2018-09-06 DIAGNOSIS — G44.209 TENSION-TYPE HEADACHE, NOT INTRACTABLE, UNSPECIFIED CHRONICITY PATTERN: ICD-10-CM

## 2018-09-06 PROCEDURE — 1101F PT FALLS ASSESS-DOCD LE1/YR: CPT | Mod: ,,, | Performed by: INTERNAL MEDICINE

## 2018-09-06 PROCEDURE — 99999 PR PBB SHADOW E&M-EST. PATIENT-LVL IV: CPT | Mod: PBBFAC,,, | Performed by: INTERNAL MEDICINE

## 2018-09-06 PROCEDURE — 3077F SYST BP >= 140 MM HG: CPT | Mod: ,,, | Performed by: INTERNAL MEDICINE

## 2018-09-06 PROCEDURE — 99214 OFFICE O/P EST MOD 30 MIN: CPT | Mod: PBBFAC,PN | Performed by: INTERNAL MEDICINE

## 2018-09-06 PROCEDURE — 99213 OFFICE O/P EST LOW 20 MIN: CPT | Mod: S$PBB,,, | Performed by: INTERNAL MEDICINE

## 2018-09-06 PROCEDURE — 3078F DIAST BP <80 MM HG: CPT | Mod: ,,, | Performed by: INTERNAL MEDICINE

## 2018-09-06 RX ORDER — BUTALBITAL, ACETAMINOPHEN AND CAFFEINE 50; 325; 40 MG/1; MG/1; MG/1
1 TABLET ORAL EVERY 6 HOURS PRN
Qty: 30 TABLET | Refills: 2 | Status: SHIPPED | OUTPATIENT
Start: 2018-09-06 | End: 2018-11-09 | Stop reason: SDUPTHER

## 2018-09-06 NOTE — TELEPHONE ENCOUNTER
----- Message from Pan Castle sent at 9/5/2018  5:29 PM CDT -----  Contact: self  Pt called to see if she can get seen the same time as her  who will be seen Sept 6 for 12:30.          Pt callback number [phone]

## 2018-09-07 NOTE — PROGRESS NOTES
Subjective:       Patient ID: Radha Jurado is a 68 y.o. female.    Chief Complaint: Follow-up (hospital f/u)    HPI  Pt is here for f/u and refill her HA med c/o lower abd dyscomfort on and off no diarrhea no fever chill no n/v no sob cp feel better since off BP meds and metoprolol bradycardia resolved po diet better not feeling weak and sick anymore  Review of Systems    Objective:      Physical Exam   Constitutional: She is oriented to person, place, and time. She appears well-developed and well-nourished. No distress.   HENT:   Head: Normocephalic and atraumatic.   Right Ear: External ear normal.   Left Ear: External ear normal.   Nose: Nose normal.   Mouth/Throat: Oropharynx is clear and moist. No oropharyngeal exudate.   Eyes: Conjunctivae and EOM are normal. Pupils are equal, round, and reactive to light. Right eye exhibits no discharge. Left eye exhibits no discharge.   Neck: Normal range of motion. Neck supple. No thyromegaly present.   Cardiovascular: Normal rate, regular rhythm, normal heart sounds and intact distal pulses. Exam reveals no gallop and no friction rub.   No murmur heard.  Pulmonary/Chest: Effort normal and breath sounds normal. No respiratory distress. She has no wheezes. She has no rales. She exhibits no tenderness.   Abdominal: Soft. Bowel sounds are normal. She exhibits no distension. There is no tenderness. There is no rebound and no guarding.   Musculoskeletal: Normal range of motion. She exhibits no edema, tenderness or deformity.   Lymphadenopathy:     She has no cervical adenopathy.   Neurological: She is alert and oriented to person, place, and time.   Skin: Skin is warm and dry. Capillary refill takes less than 2 seconds. No rash noted. No erythema.   Psychiatric: She has a normal mood and affect. Judgment and thought content normal.   Nursing note and vitals reviewed.      Assessment:       1. Headache, unspecified headache type    2. Tension-type headache, not intractable,  unspecified chronicity pattern    3. Lower abdominal pain        Plan:       Headache, unspecified headache type    Tension-type headache, not intractable, unspecified chronicity pattern  -     butalbital-acetaminophen-caffeine -40 mg (FIORICET, ESGIC) -40 mg per tablet; Take 1 tablet by mouth every 6 (six) hours as needed for Pain.  Dispense: 30 tablet; Refill: 2    Lower abdominal pain  -     X-Ray Abdomen Flat And Erect; Future; Expected date: 09/06/2018  -     POCT URINE DIPSTICK WITHOUT MICROSCOPE

## 2018-11-09 ENCOUNTER — OFFICE VISIT (OUTPATIENT)
Dept: PRIMARY CARE CLINIC | Facility: CLINIC | Age: 68
End: 2018-11-09
Payer: MEDICARE

## 2018-11-09 VITALS
TEMPERATURE: 98 F | OXYGEN SATURATION: 97 % | HEART RATE: 83 BPM | RESPIRATION RATE: 18 BRPM | BODY MASS INDEX: 18.75 KG/M2 | SYSTOLIC BLOOD PRESSURE: 158 MMHG | DIASTOLIC BLOOD PRESSURE: 65 MMHG | HEIGHT: 60 IN

## 2018-11-09 DIAGNOSIS — Z99.2 PRESENCE OF ARTERIAL-VENOUS SHUNT (FOR DIALYSIS): ICD-10-CM

## 2018-11-09 DIAGNOSIS — Z99.2 ESRD ON DIALYSIS: ICD-10-CM

## 2018-11-09 DIAGNOSIS — K21.9 GASTROESOPHAGEAL REFLUX DISEASE, ESOPHAGITIS PRESENCE NOT SPECIFIED: ICD-10-CM

## 2018-11-09 DIAGNOSIS — G44.209 TENSION-TYPE HEADACHE, NOT INTRACTABLE, UNSPECIFIED CHRONICITY PATTERN: Primary | ICD-10-CM

## 2018-11-09 DIAGNOSIS — N18.6 ESRD ON DIALYSIS: ICD-10-CM

## 2018-11-09 PROCEDURE — 99213 OFFICE O/P EST LOW 20 MIN: CPT | Mod: S$GLB,,, | Performed by: INTERNAL MEDICINE

## 2018-11-09 PROCEDURE — 1101F PT FALLS ASSESS-DOCD LE1/YR: CPT | Mod: S$GLB,,, | Performed by: INTERNAL MEDICINE

## 2018-11-09 PROCEDURE — 99999 PR PBB SHADOW E&M-EST. PATIENT-LVL III: CPT | Mod: PBBFAC,,, | Performed by: INTERNAL MEDICINE

## 2018-11-09 RX ORDER — BUTALBITAL, ACETAMINOPHEN AND CAFFEINE 50; 325; 40 MG/1; MG/1; MG/1
1 TABLET ORAL EVERY 6 HOURS PRN
Qty: 30 TABLET | Refills: 2 | Status: SHIPPED | OUTPATIENT
Start: 2018-11-09 | End: 2019-02-14 | Stop reason: SDUPTHER

## 2018-11-09 RX ORDER — PANTOPRAZOLE SODIUM 40 MG/1
40 TABLET, DELAYED RELEASE ORAL DAILY
Qty: 90 TABLET | Refills: 3 | Status: SHIPPED | OUTPATIENT
Start: 2018-11-09 | End: 2019-03-18

## 2018-11-09 NOTE — PROGRESS NOTES
Subjective:       Patient ID: Radha Jurado is a 68 y.o. female.    Chief Complaint: Medication Refill    HPI  Pt needs refill her HA medications ha schronic tension migrain HA controlled with medications and now on dialyses for ESRD tolerating well had AV fistula left wrist no complicationsno pain or color change left hand  Review of Systems    Objective:      Physical Exam   Constitutional: She is oriented to person, place, and time. She appears well-developed and well-nourished. No distress.   HENT:   Head: Normocephalic and atraumatic.   Right Ear: External ear normal.   Left Ear: External ear normal.   Nose: Nose normal.   Mouth/Throat: Oropharynx is clear and moist. No oropharyngeal exudate.   Eyes: Conjunctivae and EOM are normal. Pupils are equal, round, and reactive to light. Right eye exhibits no discharge. Left eye exhibits no discharge.   Neck: Normal range of motion. Neck supple. No thyromegaly present.   Cardiovascular: Normal rate, regular rhythm, normal heart sounds and intact distal pulses. Exam reveals no gallop and no friction rub.   No murmur heard.  permacath central line rt upper chest no sweeling exudate or dranage and AV shunt ledt wrist ha sgood thrill   Pulmonary/Chest: Effort normal and breath sounds normal. No respiratory distress. She has no wheezes. She has no rales. She exhibits no tenderness.   Abdominal: Soft. Bowel sounds are normal. She exhibits no distension. There is no tenderness. There is no rebound and no guarding.   Musculoskeletal: Normal range of motion. She exhibits no edema, tenderness or deformity.   Lymphadenopathy:     She has no cervical adenopathy.   Neurological: She is alert and oriented to person, place, and time.   Skin: Skin is warm and dry. Capillary refill takes less than 2 seconds. No rash noted. No erythema.   Psychiatric: She has a normal mood and affect. Judgment and thought content normal.   Nursing note and vitals reviewed.      Assessment:       1.  Tension-type headache, not intractable, unspecified chronicity pattern    2. Gastroesophageal reflux disease, esophagitis presence not specified    3. ESRD on dialysis    4. Presence of arterial-venous shunt (for dialysis)        Plan:       Tension-type headache, not intractable, unspecified chronicity pattern  -     butalbital-acetaminophen-caffeine -40 mg (FIORICET, ESGIC) -40 mg per tablet; Take 1 tablet by mouth every 6 (six) hours as needed for Pain.  Dispense: 30 tablet; Refill: 2    Gastroesophageal reflux disease, esophagitis presence not specified  -     pantoprazole (PROTONIX) 40 MG tablet; Take 1 tablet (40 mg total) by mouth once daily.  Dispense: 90 tablet; Refill: 3    ESRD on dialysis    Presence of arterial-venous shunt (for dialysis)

## 2018-11-11 PROBLEM — Z99.2 ESRD ON DIALYSIS: Status: ACTIVE | Noted: 2018-11-11

## 2018-11-11 PROBLEM — N18.6 ESRD ON DIALYSIS: Status: ACTIVE | Noted: 2018-11-11

## 2018-11-30 ENCOUNTER — PES CALL (OUTPATIENT)
Dept: ADMINISTRATIVE | Facility: CLINIC | Age: 68
End: 2018-11-30

## 2019-01-07 ENCOUNTER — OFFICE VISIT (OUTPATIENT)
Dept: PRIMARY CARE CLINIC | Facility: CLINIC | Age: 69
End: 2019-01-07
Payer: MEDICARE

## 2019-01-07 VITALS
OXYGEN SATURATION: 96 % | SYSTOLIC BLOOD PRESSURE: 131 MMHG | TEMPERATURE: 99 F | HEIGHT: 60 IN | DIASTOLIC BLOOD PRESSURE: 67 MMHG | HEART RATE: 83 BPM | WEIGHT: 99.19 LBS | BODY MASS INDEX: 19.47 KG/M2 | RESPIRATION RATE: 18 BRPM

## 2019-01-07 DIAGNOSIS — M19.042 ARTHRITIS OF BOTH HANDS: ICD-10-CM

## 2019-01-07 DIAGNOSIS — M79.642 BILATERAL HAND PAIN: Primary | ICD-10-CM

## 2019-01-07 DIAGNOSIS — M19.041 ARTHRITIS OF BOTH HANDS: ICD-10-CM

## 2019-01-07 DIAGNOSIS — M79.641 BILATERAL HAND PAIN: Primary | ICD-10-CM

## 2019-01-07 PROCEDURE — 99213 OFFICE O/P EST LOW 20 MIN: CPT | Mod: S$GLB,,, | Performed by: INTERNAL MEDICINE

## 2019-01-07 PROCEDURE — 1101F PT FALLS ASSESS-DOCD LE1/YR: CPT | Mod: S$GLB,,, | Performed by: INTERNAL MEDICINE

## 2019-01-07 PROCEDURE — 99213 PR OFFICE/OUTPT VISIT, EST, LEVL III, 20-29 MIN: ICD-10-PCS | Mod: S$GLB,,, | Performed by: INTERNAL MEDICINE

## 2019-01-07 PROCEDURE — 99999 PR PBB SHADOW E&M-EST. PATIENT-LVL III: CPT | Mod: PBBFAC,,, | Performed by: INTERNAL MEDICINE

## 2019-01-07 PROCEDURE — 1101F PR PT FALLS ASSESS DOC 0-1 FALLS W/OUT INJ PAST YR: ICD-10-PCS | Mod: S$GLB,,, | Performed by: INTERNAL MEDICINE

## 2019-01-07 PROCEDURE — 99999 PR PBB SHADOW E&M-EST. PATIENT-LVL III: ICD-10-PCS | Mod: PBBFAC,,, | Performed by: INTERNAL MEDICINE

## 2019-01-07 RX ORDER — TRAMADOL HYDROCHLORIDE 50 MG/1
50 TABLET ORAL EVERY 12 HOURS PRN
Qty: 30 TABLET | Refills: 0 | Status: SHIPPED | OUTPATIENT
Start: 2019-01-07 | End: 2019-03-15 | Stop reason: SDUPTHER

## 2019-01-07 RX ORDER — PREDNISONE 10 MG/1
TABLET ORAL
Qty: 30 TABLET | Refills: 0 | Status: SHIPPED | OUTPATIENT
Start: 2019-01-07 | End: 2019-05-14

## 2019-01-08 NOTE — PROGRESS NOTES
Subjective:       Patient ID: Radha Jurado is a 68 y.o. female.    Chief Complaint: Hand Pain (bilateral )    HPI  patient complained both the hand hurt since the new year see has been taking care of her  who recently had abdominal surgery to resect the hematoma has home health to come to wound care the pain in her hand CC does right SI arthritis taking pain no burning no numbness tingling patient also have a balloon and shunt in the left wrist for access to chronic dialysis  Review of Systems    Objective:      Physical Exam   Constitutional: She is oriented to person, place, and time. She appears well-developed and well-nourished. No distress.   HENT:   Head: Normocephalic and atraumatic.   Right Ear: External ear normal.   Left Ear: External ear normal.   Nose: Nose normal.   Mouth/Throat: Oropharynx is clear and moist. No oropharyngeal exudate.   Eyes: Conjunctivae and EOM are normal. Pupils are equal, round, and reactive to light. Right eye exhibits no discharge. Left eye exhibits no discharge.   Neck: Normal range of motion. Neck supple. No thyromegaly present.   Cardiovascular: Normal rate, regular rhythm, normal heart sounds and intact distal pulses. Exam reveals no gallop and no friction rub.   No murmur heard.  Good thrill left wrist   Pulmonary/Chest: Effort normal and breath sounds normal. No respiratory distress. She has no wheezes. She has no rales. She exhibits no tenderness.   Abdominal: Soft. Bowel sounds are normal. She exhibits no distension. There is no tenderness. There is no rebound and no guarding.   Musculoskeletal: Normal range of motion. She exhibits tenderness (Tenderness in both hand with palpation all over the hand and finger skin is pitting good radial pulses no swelling). She exhibits no edema or deformity.   Lymphadenopathy:     She has no cervical adenopathy.   Neurological: She is alert and oriented to person, place, and time.   Skin: Skin is warm and dry. Capillary  refill takes less than 2 seconds. No rash noted. No erythema.   Psychiatric: She has a normal mood and affect. Judgment and thought content normal.   Nursing note and vitals reviewed.      Assessment:       1. Bilateral hand pain    2. Arthritis of both hands        Plan:       Bilateral hand pain  -     predniSONE (DELTASONE) 10 MG tablet; 2 po BID x 4 days then 1 po BID x 4 days then 1 po qd  Dispense: 30 tablet; Refill: 0  -     traMADol (ULTRAM) 50 mg tablet; Take 1 tablet (50 mg total) by mouth every 12 (twelve) hours as needed.  Dispense: 30 tablet; Refill: 0    Arthritis of both hands  -     predniSONE (DELTASONE) 10 MG tablet; 2 po BID x 4 days then 1 po BID x 4 days then 1 po qd  Dispense: 30 tablet; Refill: 0  -     traMADol (ULTRAM) 50 mg tablet; Take 1 tablet (50 mg total) by mouth every 12 (twelve) hours as needed.  Dispense: 30 tablet; Refill: 0

## 2019-02-14 ENCOUNTER — OFFICE VISIT (OUTPATIENT)
Dept: PRIMARY CARE CLINIC | Facility: CLINIC | Age: 69
End: 2019-02-14
Payer: MEDICARE

## 2019-02-14 VITALS
HEART RATE: 59 BPM | SYSTOLIC BLOOD PRESSURE: 181 MMHG | TEMPERATURE: 98 F | OXYGEN SATURATION: 95 % | WEIGHT: 102.81 LBS | DIASTOLIC BLOOD PRESSURE: 64 MMHG | HEIGHT: 60 IN | RESPIRATION RATE: 18 BRPM | BODY MASS INDEX: 20.18 KG/M2

## 2019-02-14 DIAGNOSIS — E78.5 DYSLIPIDEMIA: ICD-10-CM

## 2019-02-14 DIAGNOSIS — Z12.31 ENCOUNTER FOR SCREENING MAMMOGRAM FOR BREAST CANCER: ICD-10-CM

## 2019-02-14 DIAGNOSIS — M89.9 BONE DISORDER: ICD-10-CM

## 2019-02-14 DIAGNOSIS — N18.6 ESRD ON DIALYSIS: ICD-10-CM

## 2019-02-14 DIAGNOSIS — G44.209 TENSION-TYPE HEADACHE, NOT INTRACTABLE, UNSPECIFIED CHRONICITY PATTERN: ICD-10-CM

## 2019-02-14 DIAGNOSIS — Z99.2 ESRD ON DIALYSIS: ICD-10-CM

## 2019-02-14 DIAGNOSIS — Z13.220 SCREENING FOR HYPERLIPIDEMIA: ICD-10-CM

## 2019-02-14 DIAGNOSIS — Z01.818 PREOP EXAMINATION: Primary | ICD-10-CM

## 2019-02-14 DIAGNOSIS — Z12.11 SCREENING FOR COLON CANCER: ICD-10-CM

## 2019-02-14 PROCEDURE — 1101F PR PT FALLS ASSESS DOC 0-1 FALLS W/OUT INJ PAST YR: ICD-10-PCS | Mod: S$GLB,,, | Performed by: INTERNAL MEDICINE

## 2019-02-14 PROCEDURE — 99999 PR PBB SHADOW E&M-EST. PATIENT-LVL IV: ICD-10-PCS | Mod: PBBFAC,,, | Performed by: INTERNAL MEDICINE

## 2019-02-14 PROCEDURE — 99999 PR PBB SHADOW E&M-EST. PATIENT-LVL IV: CPT | Mod: PBBFAC,,, | Performed by: INTERNAL MEDICINE

## 2019-02-14 PROCEDURE — 99213 PR OFFICE/OUTPT VISIT, EST, LEVL III, 20-29 MIN: ICD-10-PCS | Mod: S$GLB,,, | Performed by: INTERNAL MEDICINE

## 2019-02-14 PROCEDURE — 99213 OFFICE O/P EST LOW 20 MIN: CPT | Mod: S$GLB,,, | Performed by: INTERNAL MEDICINE

## 2019-02-14 PROCEDURE — 1101F PT FALLS ASSESS-DOCD LE1/YR: CPT | Mod: S$GLB,,, | Performed by: INTERNAL MEDICINE

## 2019-02-14 RX ORDER — AMLODIPINE BESYLATE 5 MG/1
5 TABLET ORAL DAILY
COMMUNITY
End: 2019-08-07

## 2019-02-14 RX ORDER — BUTALBITAL, ACETAMINOPHEN AND CAFFEINE 50; 325; 40 MG/1; MG/1; MG/1
1 TABLET ORAL EVERY 6 HOURS PRN
Qty: 30 TABLET | Refills: 2 | Status: SHIPPED | OUTPATIENT
Start: 2019-02-14 | End: 2019-05-14 | Stop reason: SDUPTHER

## 2019-02-15 NOTE — PROGRESS NOTES
Subjective:       Patient ID: Radha Jurado is a 69 y.o. female.    Chief Complaint: Follow-up    HPI patient is here for follow-up and refill medication for headache patient has been getting dialysis through a [facility] in the right chest had AV shunt placement left wrist but not functioning anymore has appointment with the new surgeon the next week no short of breath chest pain dyspnea with exertion still smoking but the loss less no short of breath chest pain nausea vomiting Kassidy elevated blood pressure today but want to wait until dialysis tomorrow for the nephrologist to adjust her blood pressure medication patient had flu vaccine pneumococcal vaccine hepatitis vaccine at the dialysis center 2 weeks ago  Review of Systems    Objective:      Physical Exam    Assessment:       1. Preop examination    2. Screening for colon cancer    3. Bone disorder    4. Screening for hyperlipidemia    5. Dyslipidemia    6. Tension-type headache, not intractable, unspecified chronicity pattern    7. Encounter for screening mammogram for breast cancer    8. ESRD on dialysis        Plan:       Preop examination  Comments:  pt is medically cleared for cataract surhery under monitor anesthesia care    Screening for colon cancer  -     Ambulatory referral to Colorectal Surgery    Bone disorder  -     DXA Bone Density Spine And Hip; Future; Expected date: 02/14/2019  -     VITAMIN D; Future; Expected date: 02/14/2019    Screening for hyperlipidemia  -     Lipid panel; Future; Expected date: 02/14/2019    Dyslipidemia  -     Lipid panel; Future; Expected date: 03/14/2019  -     Lipid panel; Future; Expected date: 02/14/2019    Tension-type headache, not intractable, unspecified chronicity pattern  -     Discontinue: butalbital-acetaminophen-caffeine -40 mg (FIORICET, ESGIC) -40 mg per tablet; Take 1 tablet by mouth every 6 (six) hours as needed for Pain.  Dispense: 30 tablet; Refill: 2  -     CBC auto differential;  Future; Expected date: 02/14/2019  -     Comprehensive metabolic panel; Future; Expected date: 02/14/2019    Encounter for screening mammogram for breast cancer  -     Mammo Digital Screening Bilat without CA; Future; Expected date: 02/28/2019    ESRD on dialysis  Comments:  stable on dialyses

## 2019-02-22 ENCOUNTER — TELEPHONE (OUTPATIENT)
Dept: SURGERY | Facility: CLINIC | Age: 69
End: 2019-02-22

## 2019-02-22 ENCOUNTER — TELEPHONE (OUTPATIENT)
Dept: ADMINISTRATIVE | Facility: HOSPITAL | Age: 69
End: 2019-02-22

## 2019-02-22 NOTE — TELEPHONE ENCOUNTER
Placed call at [phone] to attempt to schedule patient for DEXA Scan and Mammo. Spoke w/ family member and left a message for return call at patient's earliest convenience.

## 2019-02-25 ENCOUNTER — TELEPHONE (OUTPATIENT)
Dept: SURGERY | Facility: CLINIC | Age: 69
End: 2019-02-25

## 2019-02-25 NOTE — TELEPHONE ENCOUNTER
----- Message from Bulmaro Spann sent at 2/25/2019  9:17 AM CST -----  Type:  Patient Returning Call    Who Called:  Patient  Who Left Message for Patient:  Mustapha  Does the patient know what this is regarding?:  no  Best Call Back Number:  [phone] (home)

## 2019-02-25 NOTE — TELEPHONE ENCOUNTER
Returned patient's call in reference to colon cancer screening at all available numbers left message.

## 2019-03-01 ENCOUNTER — TELEPHONE (OUTPATIENT)
Dept: SURGERY | Facility: CLINIC | Age: 69
End: 2019-03-01

## 2019-03-14 ENCOUNTER — TELEPHONE (OUTPATIENT)
Dept: TRANSPLANT | Facility: CLINIC | Age: 69
End: 2019-03-14

## 2019-03-15 DIAGNOSIS — M79.642 BILATERAL HAND PAIN: ICD-10-CM

## 2019-03-15 DIAGNOSIS — M19.042 ARTHRITIS OF BOTH HANDS: ICD-10-CM

## 2019-03-15 DIAGNOSIS — M79.641 BILATERAL HAND PAIN: ICD-10-CM

## 2019-03-15 DIAGNOSIS — M19.041 ARTHRITIS OF BOTH HANDS: ICD-10-CM

## 2019-03-15 RX ORDER — TRAMADOL HYDROCHLORIDE 50 MG/1
50 TABLET ORAL EVERY 12 HOURS PRN
Qty: 30 TABLET | Refills: 0 | Status: SHIPPED | OUTPATIENT
Start: 2019-03-15 | End: 2019-08-15 | Stop reason: SDUPTHER

## 2019-03-16 DIAGNOSIS — K21.9 GASTROESOPHAGEAL REFLUX DISEASE, ESOPHAGITIS PRESENCE NOT SPECIFIED: Primary | ICD-10-CM

## 2019-03-16 NOTE — TELEPHONE ENCOUNTER
Call pt need reduce dosage protonix from 40 mg po qd to 20 mg po qd #90 with 3 refills after finish her current prescription

## 2019-03-18 RX ORDER — PANTOPRAZOLE SODIUM 20 MG/1
20 TABLET, DELAYED RELEASE ORAL DAILY
Qty: 90 TABLET | Refills: 3 | Status: SHIPPED | OUTPATIENT
Start: 2019-03-18 | End: 2019-04-12

## 2019-04-10 DIAGNOSIS — K21.9 GASTROESOPHAGEAL REFLUX DISEASE, ESOPHAGITIS PRESENCE NOT SPECIFIED: ICD-10-CM

## 2019-04-12 RX ORDER — SUCRALFATE 1 G/1
TABLET ORAL
Qty: 90 TABLET | Refills: 3 | Status: SHIPPED | OUTPATIENT
Start: 2019-04-12

## 2019-04-30 ENCOUNTER — PATIENT OUTREACH (OUTPATIENT)
Dept: ADMINISTRATIVE | Facility: HOSPITAL | Age: 69
End: 2019-04-30

## 2019-04-30 DIAGNOSIS — Z11.59 NEED FOR HEPATITIS C SCREENING TEST: ICD-10-CM

## 2019-04-30 NOTE — PROGRESS NOTES
Immunizations reviewed. Legacy reviewed. Placed orders for Hepatitis C Screening. Orders on file for Mammogram, DEXA Scan, and Lipid Panel. Attempted to contact patient on all numbers listed on file to close gaps on HM. First number listed, phone just rings and was unable to leave a message on machine. Second number went straight to voicemail and message was left for return call at patient's earliest convenience. Letter sent to address on file. Pre-visit chart review completed.

## 2019-04-30 NOTE — LETTER
April 30, 2019    Radha Jurado  [address] [place] [address]             [facility]  Jeovany Carpio MD   [address]  Phone: [phone]  Fax: [phone]   [facility].  To help you get the most out of each of your visits, we will review your information to make sure you are up to date on all of your recommended tests and/or procedures.      Dr. Carpio has found that you may be due for a Hepatitis C Screening, Shingles Vaccine, Tetanus Vaccine, Lung Screening, Mammogram, Lipid Panel lab draw, DEXA Scan (Bone Density Test), and a Colonoscopy/Fit Kit (Colon Cancer Screening).    Please contact me at [phone] to schedule your overdue health maintenance.  If you have had any of the above done at another facility, please bring the records or information with you so that your record at [facility] will be complete. You can also sign a release of information at your upcoming appointment so we can obtain the tests/procedures. If you are currently taking medication, please bring it with you to your appointment for review.    Also, if you have any type of Advanced Directives, please bring them with you to your office visit so we may scan them into your chart.       Thanks,    MD Manny Sousa LPN  Clinical Care Coordinator

## 2019-05-14 ENCOUNTER — OFFICE VISIT (OUTPATIENT)
Dept: PRIMARY CARE CLINIC | Facility: CLINIC | Age: 69
End: 2019-05-14
Payer: MEDICARE

## 2019-05-14 VITALS
HEART RATE: 80 BPM | SYSTOLIC BLOOD PRESSURE: 145 MMHG | OXYGEN SATURATION: 97 % | DIASTOLIC BLOOD PRESSURE: 56 MMHG | HEIGHT: 60 IN | RESPIRATION RATE: 18 BRPM | BODY MASS INDEX: 20.26 KG/M2 | TEMPERATURE: 98 F | WEIGHT: 103.19 LBS

## 2019-05-14 DIAGNOSIS — G44.209 TENSION-TYPE HEADACHE, NOT INTRACTABLE, UNSPECIFIED CHRONICITY PATTERN: ICD-10-CM

## 2019-05-14 DIAGNOSIS — Z12.31 ENCOUNTER FOR SCREENING MAMMOGRAM FOR BREAST CANCER: ICD-10-CM

## 2019-05-14 DIAGNOSIS — M89.9 DISEASE OF BONE: ICD-10-CM

## 2019-05-14 DIAGNOSIS — Z13.6 ENCOUNTER FOR SCREENING FOR CARDIOVASCULAR DISORDERS: ICD-10-CM

## 2019-05-14 DIAGNOSIS — Z12.11 COLON CANCER SCREENING: Primary | ICD-10-CM

## 2019-05-14 DIAGNOSIS — Z99.2 ESRD ON DIALYSIS: ICD-10-CM

## 2019-05-14 DIAGNOSIS — N18.6 ESRD ON DIALYSIS: ICD-10-CM

## 2019-05-14 PROCEDURE — 99213 PR OFFICE/OUTPT VISIT, EST, LEVL III, 20-29 MIN: ICD-10-PCS | Mod: NTX,S$GLB,, | Performed by: INTERNAL MEDICINE

## 2019-05-14 PROCEDURE — 99213 OFFICE O/P EST LOW 20 MIN: CPT | Mod: NTX,S$GLB,, | Performed by: INTERNAL MEDICINE

## 2019-05-14 PROCEDURE — 1101F PT FALLS ASSESS-DOCD LE1/YR: CPT | Mod: NTX,S$GLB,, | Performed by: INTERNAL MEDICINE

## 2019-05-14 PROCEDURE — 99999 PR PBB SHADOW E&M-EST. PATIENT-LVL IV: CPT | Mod: PBBFAC,TXP,, | Performed by: INTERNAL MEDICINE

## 2019-05-14 PROCEDURE — 99999 PR PBB SHADOW E&M-EST. PATIENT-LVL IV: ICD-10-PCS | Mod: PBBFAC,TXP,, | Performed by: INTERNAL MEDICINE

## 2019-05-14 PROCEDURE — 1101F PR PT FALLS ASSESS DOC 0-1 FALLS W/OUT INJ PAST YR: ICD-10-PCS | Mod: NTX,S$GLB,, | Performed by: INTERNAL MEDICINE

## 2019-05-14 RX ORDER — BUTALBITAL, ACETAMINOPHEN AND CAFFEINE 50; 325; 40 MG/1; MG/1; MG/1
1 TABLET ORAL 2 TIMES DAILY PRN
Qty: 30 TABLET | Refills: 2 | Status: SHIPPED | OUTPATIENT
Start: 2019-05-14 | End: 2019-08-15 | Stop reason: SDUPTHER

## 2019-05-14 NOTE — PROGRESS NOTES
Subjective:       Patient ID: Radha Jurado is a 69 y.o. female.    Chief Complaint: Follow-up    HPI patient is here for follow-up clinically doing well no new physical complain except chronic recurrent headache controlled with medication also getting dialysis 3 times a week tolerated well without complication patient see cardiologist Dr. ayoub cardiac workup negative just had blood test done today will get copy of results bring to the office later  Review of Systems    Objective:      Physical Exam   Constitutional: She is oriented to person, place, and time. She appears well-developed and well-nourished. No distress.   HENT:   Head: Normocephalic and atraumatic.   Right Ear: External ear normal.   Left Ear: External ear normal.   Nose: Nose normal.   Mouth/Throat: Oropharynx is clear and moist. No oropharyngeal exudate.   Eyes: Pupils are equal, round, and reactive to light. Conjunctivae and EOM are normal. Right eye exhibits no discharge. Left eye exhibits no discharge.   Neck: Normal range of motion. Neck supple. No thyromegaly present.   Cardiovascular: Normal rate, regular rhythm, normal heart sounds and intact distal pulses. Exam reveals no gallop and no friction rub.   No murmur heard.  Pulmonary/Chest: Effort normal and breath sounds normal. No respiratory distress. She has no wheezes. She has no rales. She exhibits no tenderness.   Abdominal: Soft. Bowel sounds are normal. She exhibits no distension. There is no tenderness. There is no rebound and no guarding.   Musculoskeletal: Normal range of motion. She exhibits no edema, tenderness or deformity.   Lymphadenopathy:     She has no cervical adenopathy.   Neurological: She is alert and oriented to person, place, and time.   Skin: Skin is warm and dry. Capillary refill takes less than 2 seconds. No rash noted. No erythema.   AV shin with good thrill   Psychiatric: She has a normal mood and affect. Judgment and thought content normal.   Nursing note  and vitals reviewed.      Assessment:       1. Colon cancer screening    2. Tension-type headache, not intractable, unspecified chronicity pattern    3. Encounter for screening mammogram for breast cancer    4. Disease of bone    5. ESRD on dialysis    6. Encounter for screening for cardiovascular disorders        Plan:       Colon cancer screening  -     Fecal Immunochemical Test (iFOBT); Future; Expected date: 05/14/2019    Tension-type headache, not intractable, unspecified chronicity pattern  -     butalbital-acetaminophen-caffeine -40 mg (FIORICET, ESGIC) -40 mg per tablet; Take 1 tablet by mouth 2 (two) times daily as needed for Pain.  Dispense: 30 tablet; Refill: 2    Encounter for screening mammogram for breast cancer  -     Mammo Digital Screening Bilat without CA; Future; Expected date: 05/28/2019    Disease of bone  -     DXA Bone Density Spine And Hip; Future; Expected date: 05/14/2019    ESRD on dialysis  Comments:  Stable on dialysis    Encounter for screening for cardiovascular disorders

## 2019-05-17 ENCOUNTER — TELEPHONE (OUTPATIENT)
Dept: PRIMARY CARE CLINIC | Facility: CLINIC | Age: 69
End: 2019-05-17

## 2019-05-17 NOTE — TELEPHONE ENCOUNTER
----- Message from Harjinder Farmer sent at 5/17/2019  8:35 AM CDT -----  Contact: patient  Type: Needs Medical Advice    Who Called:  patient  Symptoms (please be specific):    How long has patient had these symptoms:    Pharmacy name and phone #:    Best Call Back Number: [phone]  Additional Information: requesting medical clerence for eye surgery be sent to  fax# [phone],ph# [phone]

## 2019-05-20 ENCOUNTER — TELEPHONE (OUTPATIENT)
Dept: PRIMARY CARE CLINIC | Facility: CLINIC | Age: 69
End: 2019-05-20

## 2019-05-20 NOTE — TELEPHONE ENCOUNTER
----- Message from Natali Taylor sent at 5/20/2019  4:11 PM CDT -----  Contact: Rashida/Adelita beal Office  ......Type: Needs Medical Advice    Who Called:  Rashida/Dr Michael Beal  Best Call Back Number:   Additional Information: Requesting clinical notes from visit on 5-14-19  Clinical notes are needed before procedure tomorrow morning at 9 am  Please advise (URGENT)  Thanks

## 2019-06-12 ENCOUNTER — LAB VISIT (OUTPATIENT)
Dept: LAB | Facility: HOSPITAL | Age: 69
End: 2019-06-12
Attending: INTERNAL MEDICINE
Payer: MEDICARE

## 2019-06-12 DIAGNOSIS — Z12.11 COLON CANCER SCREENING: ICD-10-CM

## 2019-06-12 PROCEDURE — 82274 ASSAY TEST FOR BLOOD FECAL: CPT

## 2019-06-18 LAB — HEMOCCULT STL QL IA: NEGATIVE

## 2019-06-21 DIAGNOSIS — R51.9 HEADACHE, UNSPECIFIED HEADACHE TYPE: ICD-10-CM

## 2019-06-21 RX ORDER — PAROXETINE HYDROCHLORIDE 20 MG/1
20 TABLET, FILM COATED ORAL EVERY MORNING
Qty: 90 TABLET | Refills: 3 | Status: SHIPPED | OUTPATIENT
Start: 2019-06-21 | End: 2020-08-01

## 2019-06-21 NOTE — TELEPHONE ENCOUNTER
----- Message from Dia Lynn sent at 6/21/2019  8:50 AM CDT -----  Type:  RX Refill Request    Who Called:  patient  Refill or New Rx:  refill  RX Name and Strength:  Paxil (patient does not know dosage)  How is the patient currently taking it? (ex. 1XDay):  Takes one tablet at night  Is this a 30 day or 90 day RX:  90  Preferred Pharmacy with phone number:    [facility] - [address] [address]  Phone: [phone] Fax: [phone]  Local or Mail Order:  local  Ordering Provider:  Dr Jeovany Parnell Call Back Number:  [phone]  Additional Information:  Please advise patient when sent to pharmacy

## 2019-06-27 DIAGNOSIS — E78.5 HYPERLIPIDEMIA, UNSPECIFIED HYPERLIPIDEMIA TYPE: ICD-10-CM

## 2019-06-27 RX ORDER — ATORVASTATIN CALCIUM 20 MG/1
20 TABLET, FILM COATED ORAL DAILY
Qty: 90 TABLET | Refills: 3 | Status: SHIPPED | OUTPATIENT
Start: 2019-06-27 | End: 2020-07-31 | Stop reason: SDUPTHER

## 2019-07-30 ENCOUNTER — TELEPHONE (OUTPATIENT)
Dept: VASCULAR SURGERY | Facility: CLINIC | Age: 69
End: 2019-07-30

## 2019-07-30 NOTE — TELEPHONE ENCOUNTER
----- Message from Keyona Wilner sent at 7/29/2019  4:31 PM CDT -----  Contact: Patient  Type: Needs Medical Advice    Who Called:  Germain  Best Call Back Number: [phone] or [phone]  Additional Information: Patient is stating that she has clogged arteries in her legs and she was referred to Dr. Bello, but would like to speak with a nurse.Please call back and advise.

## 2019-08-07 ENCOUNTER — OFFICE VISIT (OUTPATIENT)
Dept: VASCULAR SURGERY | Facility: CLINIC | Age: 69
End: 2019-08-07
Payer: MEDICARE

## 2019-08-07 VITALS
WEIGHT: 105.38 LBS | DIASTOLIC BLOOD PRESSURE: 60 MMHG | SYSTOLIC BLOOD PRESSURE: 167 MMHG | HEART RATE: 75 BPM | BODY MASS INDEX: 19.9 KG/M2 | HEIGHT: 61 IN

## 2019-08-07 DIAGNOSIS — I73.9 PAD (PERIPHERAL ARTERY DISEASE): Primary | ICD-10-CM

## 2019-08-07 DIAGNOSIS — I73.9 PERIPHERAL VASCULAR DISEASE, UNSPECIFIED: Primary | ICD-10-CM

## 2019-08-07 PROCEDURE — 1101F PR PT FALLS ASSESS DOC 0-1 FALLS W/OUT INJ PAST YR: ICD-10-PCS | Mod: S$GLB,,, | Performed by: THORACIC SURGERY (CARDIOTHORACIC VASCULAR SURGERY)

## 2019-08-07 PROCEDURE — 99203 OFFICE O/P NEW LOW 30 MIN: CPT | Mod: S$GLB,,, | Performed by: THORACIC SURGERY (CARDIOTHORACIC VASCULAR SURGERY)

## 2019-08-07 PROCEDURE — 99999 PR PBB SHADOW E&M-EST. PATIENT-LVL III: ICD-10-PCS | Mod: PBBFAC,,, | Performed by: THORACIC SURGERY (CARDIOTHORACIC VASCULAR SURGERY)

## 2019-08-07 PROCEDURE — 1101F PT FALLS ASSESS-DOCD LE1/YR: CPT | Mod: S$GLB,,, | Performed by: THORACIC SURGERY (CARDIOTHORACIC VASCULAR SURGERY)

## 2019-08-07 PROCEDURE — 99999 PR PBB SHADOW E&M-EST. PATIENT-LVL III: CPT | Mod: PBBFAC,,, | Performed by: THORACIC SURGERY (CARDIOTHORACIC VASCULAR SURGERY)

## 2019-08-07 PROCEDURE — 99203 PR OFFICE/OUTPT VISIT, NEW, LEVL III, 30-44 MIN: ICD-10-PCS | Mod: S$GLB,,, | Performed by: THORACIC SURGERY (CARDIOTHORACIC VASCULAR SURGERY)

## 2019-08-07 RX ORDER — PANTOPRAZOLE SODIUM 20 MG/1
20 TABLET, DELAYED RELEASE ORAL DAILY
Refills: 3 | COMMUNITY
Start: 2019-06-27 | End: 2020-04-06

## 2019-08-07 RX ORDER — FERRIC CITRATE 210 MG/1
TABLET, COATED ORAL
COMMUNITY
Start: 2019-06-12 | End: 2019-12-03

## 2019-08-07 RX ORDER — AMLODIPINE BESYLATE 10 MG/1
10 TABLET ORAL NIGHTLY
Refills: 2 | Status: ON HOLD | COMMUNITY
Start: 2019-07-23 | End: 2020-06-15 | Stop reason: HOSPADM

## 2019-08-07 RX ORDER — CLOPIDOGREL BISULFATE 75 MG/1
75 TABLET ORAL DAILY
Refills: 5 | Status: ON HOLD | COMMUNITY
Start: 2019-07-23 | End: 2020-09-01 | Stop reason: SDUPTHER

## 2019-08-07 RX ORDER — METOPROLOL SUCCINATE 50 MG/1
50 TABLET, EXTENDED RELEASE ORAL DAILY
Refills: 0 | Status: ON HOLD | COMMUNITY
Start: 2019-06-12 | End: 2020-09-01 | Stop reason: HOSPADM

## 2019-08-07 RX ORDER — UMECLIDINIUM BROMIDE AND VILANTEROL TRIFENATATE 62.5; 25 UG/1; UG/1
POWDER RESPIRATORY (INHALATION)
Refills: 3 | COMMUNITY
Start: 2019-07-29 | End: 2021-01-01 | Stop reason: CLARIF

## 2019-08-08 ENCOUNTER — HOSPITAL ENCOUNTER (OUTPATIENT)
Facility: OTHER | Age: 69
Discharge: HOME | End: 2019-08-08
Attending: INTERNAL MEDICINE | Admitting: INTERNAL MEDICINE
Payer: MEDICARE

## 2019-08-08 VITALS
RESPIRATION RATE: 18 BRPM | WEIGHT: 102 LBS | HEART RATE: 59 BPM | TEMPERATURE: 98 F | DIASTOLIC BLOOD PRESSURE: 93 MMHG | BODY MASS INDEX: 19.26 KG/M2 | HEIGHT: 61 IN | OXYGEN SATURATION: 99 % | SYSTOLIC BLOOD PRESSURE: 166 MMHG

## 2019-08-08 DIAGNOSIS — N18.6 ESRD (END STAGE RENAL DISEASE) ON DIALYSIS: ICD-10-CM

## 2019-08-08 DIAGNOSIS — Z99.2 ESRD (END STAGE RENAL DISEASE) ON DIALYSIS: ICD-10-CM

## 2019-08-08 DIAGNOSIS — T82.590A MECHANICAL COMPLICATION OF ARTERIOVENOUS FISTULA SURGICALLY CREATED, INITIAL ENCOUNTER: Primary | ICD-10-CM

## 2019-08-08 PROCEDURE — 25000003 PHARM REV CODE 250: Performed by: INTERNAL MEDICINE

## 2019-08-08 PROCEDURE — 25500020 PHARM REV CODE 255: Performed by: INTERNAL MEDICINE

## 2019-08-08 PROCEDURE — 36901 INTRO CATH DIALYSIS CIRCUIT: CPT | Performed by: INTERNAL MEDICINE

## 2019-08-08 PROCEDURE — 63600175 PHARM REV CODE 636 W HCPCS: Performed by: INTERNAL MEDICINE

## 2019-08-08 PROCEDURE — C1894 INTRO/SHEATH, NON-LASER: HCPCS | Performed by: INTERNAL MEDICINE

## 2019-08-08 RX ORDER — HEPARIN SOD,PORCINE/0.9 % NACL 1000/500ML
INTRAVENOUS SOLUTION INTRAVENOUS
Status: DISCONTINUED | OUTPATIENT
Start: 2019-08-08 | End: 2019-08-08 | Stop reason: HOSPADM

## 2019-08-08 RX ORDER — LIDOCAINE HYDROCHLORIDE 10 MG/ML
1 INJECTION, SOLUTION EPIDURAL; INFILTRATION; INTRACAUDAL; PERINEURAL ONCE
Status: COMPLETED | OUTPATIENT
Start: 2019-08-08 | End: 2019-08-08

## 2019-08-08 RX ORDER — SODIUM CHLORIDE 0.9 % (FLUSH) 0.9 %
10 SYRINGE (ML) INJECTION
Status: DISCONTINUED | OUTPATIENT
Start: 2019-08-08 | End: 2019-08-08 | Stop reason: HOSPADM

## 2019-08-08 NOTE — PROGRESS NOTES
DATE OF VISIT:  08/07/2019.    HISTORY OF PRESENT ILLNESS:  This is a 69-year-old lady with peripheral arterial   disease and disabling limb claudication.  She had recent angiography showing a   subtotally occluded abdominal aorta and a small abdominal aortic aneurysm.    PAST HISTORY:  Pertinent for hypertension and end-stage renal disease.  She   currently dialyzes via a tunneled catheter in the right internal jugular vein.    Her other problems are reviewed.    FAMILY HISTORY:  Not pertinent.  She has been a lifelong smoker and has COPD.    PHYSICAL EXAMINATION:  VITAL SIGNS:  Stable.  HEENT:  Pupils are equal, round, reactive to light.  Nose and throat are clear.  NECK:  Supple.  CHEST:  Clear to auscultation.  HEART:  Regular rate and rhythm.  ABDOMEN:  Benign.  EXTREMITIES:  Femoral pulses are diminished as well as pedal pulses.  Her   angiograms were reviewed.  She has significant peripheral arterial disease with   disabling limb claudication.    ASSESSMENT AND PLAN:  Recommendations are for axillofemoral and bifemoral   bypass.  I explained this to the patient.  She seems to be understanding.  We   will try to arrange for this in the near future.      NEREYDA  dd: 08/07/2019 13:46:02 (CDT)  td: 08/08/2019 07:59:42 (CDT)  Doc ID   #[id number]  Job ID #[id number]    CC:

## 2019-08-08 NOTE — H&P
[facility] Cath Lab [place] 1  History & Physical    Subjective:      Chief Complaint/Reason for Admission: Here for balloon assisted maturation    Radha Jurado is a 69 y.o. female with ESRD (MWF at [facility] with Dr. Mann) who presents today for balloon-assisted maturation after her fistula (she is unsure when it was placed) has been unable to be used.  This was placed by Dr. Lemus in March 2019, and previously had undergone BAM at the [facility].  She is currently being dialyzed through a TDC.    Past Medical History:   Diagnosis Date    Arthritis     Blood transfusion     Cardiac angina     SL NTG 2-3x wk    Carotid artery occlusion     CHF (congestive heart failure)     COPD exacerbation     Coronary artery disease     w/chronic angina    Depression     Gastric peptic ulcer     HTN (hypertension)     Hyperlipidemia     Migraine headache     PAD (peripheral artery disease)     Renal insufficiency 10/23/12    last creatinine 2.1     Past Surgical History:   Procedure Laterality Date    CARDIAC CATHETERIZATION  2009    [place]    HYSTERECTOMY      OOPHORECTOMY       Family History   Problem Relation Age of Onset    Hypertension Father     Heart attack Father     Heart disease Father     Heart failure Father      Social History     Tobacco Use    Smoking status: Current Every Day Smoker     Packs/day: 0.50     Years: 50.00     Pack years: 25.00    Smokeless tobacco: Never Used   Substance Use Topics    Alcohol use: No    Drug use: No       PTA Medications   Medication Sig    amLODIPine (NORVASC) 10 MG tablet Take 10 mg by mouth every evening.    ANORO ELLIPTA 62.5-25 mcg/actuation DsDv INHALE 1 PUFF AS DIRECTED ONCE A DAY    aspirin (ECOTRIN) 81 MG EC tablet Take 81 mg by mouth once daily.      atorvastatin (LIPITOR) 20 MG tablet TAKE 1 TABLET (20 MG TOTAL) BY MOUTH ONCE DAILY.    AURYXIA 210 mg iron Tab     calcium carbonate (OS-UMAIR) 600 mg (1,500 mg) Tab Take 600 mg by  mouth once daily.      cyproheptadine (PERIACTIN) 4 mg tablet Take 1 tablet (4 mg total) by mouth 2 (two) times daily.    hydrALAZINE (APRESOLINE) 50 MG tablet Take 50 mg by mouth 3 (three) times daily.    metoprolol succinate (TOPROL-XL) 50 MG 24 hr tablet Take 50 mg by mouth once daily.    pantoprazole (PROTONIX) 20 MG tablet Take 20 mg by mouth once daily.    paroxetine (PAXIL) 20 MG tablet Take 1 tablet (20 mg total) by mouth every morning.    sucralfate (CARAFATE) 1 gram tablet TAKE ONE TABLET BY MOUTH THREE TIMES DAILY BEFORE MEALS AND BEDTIME    traMADol (ULTRAM) 50 mg tablet TAKE 1 TABLET (50 MG TOTAL) BY MOUTH EVERY 12 (TWELVE) HOURS AS NEEDED.    albuterol-ipratropium (DUO-NEB) 2.5 mg-0.5 mg/3 mL nebulizer solution Take 3 mLs by nebulization every 6 (six) hours while awake. Rescue    butalbital-acetaminophen-caffeine -40 mg (FIORICET, ESGIC) -40 mg per tablet Take 1 tablet by mouth 2 (two) times daily as needed for Pain.    clopidogrel (PLAVIX) 75 mg tablet Take 75 mg by mouth once daily.     Review of patient's allergies indicates:   Allergen Reactions    Dulcolax [bisacodyl] Other (See Comments)     Sweating a lot, weakness    Docusate sodium      Other reaction(s): Makes sweat profusely; Weak    Dulcagen         Review of Systems   Constitutional: Negative.    Respiratory: Negative.    Cardiovascular: Negative.        Objective:      Vital Signs (Most Recent)  Temp: 98.2 °F (36.8 °C) (08/08/19 0925)  Pulse: (!) 51 (08/08/19 0925)  Resp: 18 (08/08/19 0925)  BP: (!) 168/70 (08/08/19 0925)  SpO2: 99 % (08/08/19 0925)    Vital Signs Range (Last 24H):  Temp:  [98.2 °F (36.8 °C)]   Pulse:  [51-75]   Resp:  [18]   BP: (167-168)/(60-70)   SpO2:  [99 %]     Physical Exam   Constitutional: She is oriented to person, place, and time. She appears well-developed and well-nourished. No distress.   HENT:   Head: Normocephalic and atraumatic.   Eyes: EOM are normal.   Neck: Neck supple.    Pulmonary/Chest: Effort normal. No respiratory distress.   Musculoskeletal:   LUE brachiobasilic AVF with good thrill and mild pulsatility, appropriate pulse augmentation and minimal collapse on arm elevation   Neurological: She is alert and oriented to person, place, and time.   Skin: Skin is warm and dry. She is not diaphoretic.   Psychiatric: She has a normal mood and affect. Her behavior is normal.       Assessment:      Active Hospital Problems    Diagnosis  POA    ESRD (end stage renal disease) on dialysis [N18.6, Z99.2]  Not Applicable      Resolved Hospital Problems   No resolved problems to display.       Plan:      Fistulogram today for potential BAM.  Risks explained, consent signed.

## 2019-08-08 NOTE — PROCEDURES
[facility] Interventional Nephrology Service    Fistulogram Procedure Report    Date of Procedure:  08/08/2019 11:11 AM    Procedures Performed: Fistulogram with no intervention, reflux arteriogram    Indication: unable to use fistula    Referring Provider: Dr. Mann and [facility] Jennifer    Primary Surgeon: Calixto Javed MD  Assist: Everett Layton MD    Medications Administered:  1% lidocaine    Procedure Report in Detail:  After informed consent was obtained the patient was prepped and draped in the usual sterile fashion.  Her left upper brachiobasilic AVF was cannulated in the venous facing direction with a micropuncture system, confirmed in correct placement under fluroscopy, and a fistulogram was performed using iodinated contrast.  Fistulogram revealed no focal stenoses throughout the entirety of the fistula.  Central vasculature was then evaluated with contrasted film revealing widely patent central vasculature.  Reflux arteriogram revealed patent anastomosis with no juxta-anastomosis stenosis.  More than 6cm of the brachial artery was visualized and it was patent.  At this point, it was felt no further interventions were warranted, so the microsheath was pulled and direct pressure was used to achieve hemostasis.  No complications occurred during the procedure.     Estimated blood loss: < 10 mL   Estimated contrast used: 16 mL    Impression/Plan:  This was a successful fistulogram with no interventions performed.  I believe this fistula is ready to be cannulated, would recommend starting with smaller gauge needles and expert cannulater.  We will have the patient return in 3 weeks for exam and ultrasound and to ensure the fistula has been used effectively.  Please do refer the patient back if there are any signs of stenosis (increased venous pressure alarms, prolonged bleeding or worsening adequacy).      Calixto Javed MD  [phone]

## 2019-08-08 NOTE — DISCHARGE INSTRUCTIONS
After the procedure:    · Your arm and hand will be checked to make sure blood is flowing through the fistula properly. The feeling of blood rushing through the fistula is called a thrill. It is somewhat similar to the purring of a cat. Youll be taught how to check for this feeling each day to make sure there are no problems with your fistula.   · Watch for bleeding-If bleeding occurs place light pressure on the area and call your physician.    Recovering at Home  Once at home, follow all of the instructions youve been given. Be sure to:  · Take all medications as directed.  · Care for your incision as instructed.  · Check for signs of infection at the incision site (see below).  · Avoid heavy lifting and strenuous activities as directed.  · Monitor and care for your fistula as instructed      Anesthesia: Monitored Anesthesia Care (MAC)    Anesthesia Safety  · Have an adult family member or friend drive you home after the procedure.  · For the first 24 hours after your surgery:  ¨ Do not drive or use heavy equipment.  ¨ Do not make important decisions or sign documents.  ¨ Avoid alcohol.  ¨ Have someone stay with you, if possible. They can watch for problems and help keep you safe.    PLEASE FOLLOW ANY OTHER INSTRUCTIONS PROVIDED TO YOU BY DR. MI!

## 2019-08-08 NOTE — BRIEF OP NOTE
[facility] Cath Lab [facility] 1  Brief Operative Note     SUMMARY     Surgery Date: 8/8/2019     Surgeon(s) and Role:     * Calixto Javed MD - Primary    Assisting Surgeon: Everett Layton MD    Pre-op Diagnosis:  ESRD (end stage renal disease) on dialysis [N18.6, Z99.2]    Post-op Diagnosis:  Post-Op Diagnosis Codes:     * ESRD (end stage renal disease) on dialysis [N18.6, Z99.2]    Procedure(s) (LRB):  FISTULOGRAM (Left)    Anesthesia: 1% lidocaine    Description of the findings of the procedure: Patient was prepped and draped in a sterile fashion.  This was a successful fistulogram of her LUE brachiobasilic AV fistula with no interventions performed.  Patient tolerated the procedure well and no immediate complications noted.      Findings/Key Components:  Patient was prepped and draped in a sterile fashion.  This was a successful fistulogram of her LUE brachiobasilic AV fistula with no interventions performed.  Patient tolerated the procedure well and no immediate complications noted.    Estimated Blood Loss: < 10 mL         Specimens:   Specimen (12h ago, onward)    None          Discharge Note    SUMMARY     Admit Date: 8/8/2019    Discharge Date and Time:  08/08/2019 11:09 AM    Hospital Course (synopsis of major diagnoses, care, treatment, and services provided during the course of the hospital stay):  Patient was prepped and draped in a sterile fashion.  This was a successful fistulogram of her LUE brachiobasilic AV fistula with no interventions performed.  Patient tolerated the procedure well and no immediate complications noted.     Final Diagnosis: Post-Op Diagnosis Codes:     * ESRD (end stage renal disease) on dialysis [N18.6, Z99.2]    Disposition: Home or Self Care    Follow Up/Patient Instructions:     Medications:  Reconciled Home Medications:      Medication List      CONTINUE taking these medications    albuterol-ipratropium 2.5 mg-0.5 mg/3 mL nebulizer solution  Commonly known as:  DUO-NEB  Take 3  mLs by nebulization every 6 (six) hours while awake. Rescue     amLODIPine 10 MG tablet  Commonly known as:  NORVASC  Take 10 mg by mouth every evening.     ANORO ELLIPTA 62.5-25 mcg/actuation Dsdv  Generic drug:  umeclidinium-vilanterol  INHALE 1 PUFF AS DIRECTED ONCE A DAY     aspirin 81 MG EC tablet  Commonly known as:  ECOTRIN  Take 81 mg by mouth once daily.     atorvastatin 20 MG tablet  Commonly known as:  LIPITOR  TAKE 1 TABLET (20 MG TOTAL) BY MOUTH ONCE DAILY.     AURYXIA 210 mg iron Tab  Generic drug:  ferric citrate     butalbital-acetaminophen-caffeine -40 mg -40 mg per tablet  Commonly known as:  FIORICET, ESGIC  Take 1 tablet by mouth 2 (two) times daily as needed for Pain.     calcium carbonate 600 mg calcium (1,500 mg) Tab  Commonly known as:  OS-UMAIR  Take 600 mg by mouth once daily.     clopidogrel 75 mg tablet  Commonly known as:  PLAVIX  Take 75 mg by mouth once daily.     cyproheptadine 4 mg tablet  Commonly known as:  PERIACTIN  Take 1 tablet (4 mg total) by mouth 2 (two) times daily.     hydrALAZINE 50 MG tablet  Commonly known as:  APRESOLINE  Take 50 mg by mouth 3 (three) times daily.     metoprolol succinate 50 MG 24 hr tablet  Commonly known as:  TOPROL-XL  Take 50 mg by mouth once daily.     pantoprazole 20 MG tablet  Commonly known as:  PROTONIX  Take 20 mg by mouth once daily.     paroxetine 20 MG tablet  Commonly known as:  PAXIL  Take 1 tablet (20 mg total) by mouth every morning.     sucralfate 1 gram tablet  Commonly known as:  CARAFATE  TAKE ONE TABLET BY MOUTH THREE TIMES DAILY BEFORE MEALS AND BEDTIME     traMADol 50 mg tablet  Commonly known as:  ULTRAM  TAKE 1 TABLET (50 MG TOTAL) BY MOUTH EVERY 12 (TWELVE) HOURS AS NEEDED.          Discharge Procedure Orders   Lifting restrictions   Order Comments: No heavy lifting for 24 hours.     Call MD for:  temperature >100.4     Call MD for:  severe uncontrolled pain     Call MD for:  redness, tenderness, or signs of  infection (pain, swelling, redness, odor or green/yellow discharge around incision site)     Call MD for:   Order Comments: Bleeding from the access site.     Activity as tolerated     Follow-up Information     [facility] day.    Specialty:  Dialysis Center  Why:  for dialysis  Contact information:  [place] [address] [phone]

## 2019-08-09 DIAGNOSIS — I73.9 PERIPHERAL VASCULAR DISEASE, UNSPECIFIED: Primary | ICD-10-CM

## 2019-08-09 RX ORDER — MUPIROCIN 20 MG/G
OINTMENT TOPICAL
Status: CANCELLED | OUTPATIENT
Start: 2019-08-09

## 2019-08-09 RX ORDER — CHLORHEXIDINE GLUCONATE ORAL RINSE 1.2 MG/ML
10 SOLUTION DENTAL
Status: CANCELLED | OUTPATIENT
Start: 2019-08-09

## 2019-08-09 RX ORDER — LIDOCAINE HYDROCHLORIDE 10 MG/ML
1 INJECTION, SOLUTION EPIDURAL; INFILTRATION; INTRACAUDAL; PERINEURAL ONCE
Status: CANCELLED | OUTPATIENT
Start: 2019-08-09 | End: 2019-08-09

## 2019-08-15 ENCOUNTER — OFFICE VISIT (OUTPATIENT)
Dept: PRIMARY CARE CLINIC | Facility: CLINIC | Age: 69
End: 2019-08-15
Payer: MEDICARE

## 2019-08-15 VITALS
HEIGHT: 61 IN | OXYGEN SATURATION: 95 % | TEMPERATURE: 98 F | RESPIRATION RATE: 19 BRPM | DIASTOLIC BLOOD PRESSURE: 68 MMHG | HEART RATE: 74 BPM | WEIGHT: 105 LBS | BODY MASS INDEX: 19.83 KG/M2 | SYSTOLIC BLOOD PRESSURE: 164 MMHG

## 2019-08-15 DIAGNOSIS — M79.642 BILATERAL HAND PAIN: ICD-10-CM

## 2019-08-15 DIAGNOSIS — M89.9 DISEASE OF BONE: ICD-10-CM

## 2019-08-15 DIAGNOSIS — Z13.6 ENCOUNTER FOR SCREENING FOR CARDIOVASCULAR DISORDERS: ICD-10-CM

## 2019-08-15 DIAGNOSIS — N18.6 ESRD ON DIALYSIS: Primary | ICD-10-CM

## 2019-08-15 DIAGNOSIS — M79.641 BILATERAL HAND PAIN: ICD-10-CM

## 2019-08-15 DIAGNOSIS — G44.209 TENSION-TYPE HEADACHE, NOT INTRACTABLE, UNSPECIFIED CHRONICITY PATTERN: ICD-10-CM

## 2019-08-15 DIAGNOSIS — I73.9 CLAUDICATION OF BOTH LOWER EXTREMITIES: ICD-10-CM

## 2019-08-15 DIAGNOSIS — Z11.59 NEED FOR HEPATITIS C SCREENING TEST: ICD-10-CM

## 2019-08-15 DIAGNOSIS — M19.041 ARTHRITIS OF BOTH HANDS: ICD-10-CM

## 2019-08-15 DIAGNOSIS — M19.042 ARTHRITIS OF BOTH HANDS: ICD-10-CM

## 2019-08-15 DIAGNOSIS — Z99.2 ESRD ON DIALYSIS: Primary | ICD-10-CM

## 2019-08-15 PROCEDURE — 1101F PT FALLS ASSESS-DOCD LE1/YR: CPT | Mod: S$GLB,,, | Performed by: INTERNAL MEDICINE

## 2019-08-15 PROCEDURE — 1101F PR PT FALLS ASSESS DOC 0-1 FALLS W/OUT INJ PAST YR: ICD-10-PCS | Mod: S$GLB,,, | Performed by: INTERNAL MEDICINE

## 2019-08-15 PROCEDURE — 99999 PR PBB SHADOW E&M-EST. PATIENT-LVL III: CPT | Mod: PBBFAC,,, | Performed by: INTERNAL MEDICINE

## 2019-08-15 PROCEDURE — 99999 PR PBB SHADOW E&M-EST. PATIENT-LVL III: ICD-10-PCS | Mod: PBBFAC,,, | Performed by: INTERNAL MEDICINE

## 2019-08-15 PROCEDURE — 99213 PR OFFICE/OUTPT VISIT, EST, LEVL III, 20-29 MIN: ICD-10-PCS | Mod: S$GLB,,, | Performed by: INTERNAL MEDICINE

## 2019-08-15 PROCEDURE — 99213 OFFICE O/P EST LOW 20 MIN: CPT | Mod: S$GLB,,, | Performed by: INTERNAL MEDICINE

## 2019-08-15 RX ORDER — TRAMADOL HYDROCHLORIDE 50 MG/1
50 TABLET ORAL EVERY 12 HOURS PRN
Qty: 30 TABLET | Refills: 0 | Status: SHIPPED | OUTPATIENT
Start: 2019-08-15 | End: 2019-12-16 | Stop reason: SDUPTHER

## 2019-08-15 RX ORDER — BUTALBITAL, ACETAMINOPHEN AND CAFFEINE 50; 325; 40 MG/1; MG/1; MG/1
1 TABLET ORAL 2 TIMES DAILY PRN
Qty: 30 TABLET | Refills: 2 | Status: SHIPPED | OUTPATIENT
Start: 2019-08-15 | End: 2019-12-03 | Stop reason: SDUPTHER

## 2019-08-15 NOTE — PROGRESS NOTES
Subjective:       Patient ID: Radha Jurado is a 69 y.o. female.    Chief Complaint: Medication Refill and Hand Pain    HPI patient here to request refill medication for headaches and pain patient has end-stage renal disease on dialysis still has urine output she got dialysis 3 times a week 3 hr itchy section status post AV fistula and left arm doing well but not able to U yet she denies short of breath chest pain dyspnea with exertion the p.o. diet is good bowel movement and is normal patient also had claudication in both her legs scheduled to have angiogram and possible intervention with Dr. srinivasan at [facility] next week  Review of Systems    Objective:      Physical Exam   Constitutional: She is oriented to person, place, and time. She appears well-developed and well-nourished. No distress.   HENT:   Head: Normocephalic and atraumatic.   Right Ear: External ear normal.   Left Ear: External ear normal.   Nose: Nose normal.   Mouth/Throat: Oropharynx is clear and moist. No oropharyngeal exudate.   Eyes: Pupils are equal, round, and reactive to light. Conjunctivae and EOM are normal. Right eye exhibits no discharge. Left eye exhibits no discharge.   Neck: Normal range of motion. Neck supple. No thyromegaly present.   Cardiovascular: Normal rate, regular rhythm, normal heart sounds and intact distal pulses. Exam reveals no gallop and no friction rub.   No murmur heard.  Av fistula left arm have a good thrill no swelling no erythema   Pulmonary/Chest: Effort normal and breath sounds normal. No respiratory distress. She has no wheezes. She has no rales. She exhibits no tenderness.   Abdominal: Soft. Bowel sounds are normal. She exhibits no distension. There is no tenderness. There is no rebound and no guarding.   Musculoskeletal: Normal range of motion. She exhibits no edema or deformity. Tenderness: Tenderness of both hand and right middle finger due to touch trigger finger.   Lymphadenopathy:      She has no cervical adenopathy.   Neurological: She is alert and oriented to person, place, and time.   Skin: Skin is warm and dry. Capillary refill takes less than 2 seconds. No rash noted. No erythema.   PermCath on the right upper chest in place no exudte bleeding   Psychiatric: She has a normal mood and affect. Judgment and thought content normal.   Nursing note and vitals reviewed.      Assessment:       1. ESRD on dialysis    2. Bilateral hand pain    3. Arthritis of both hands    4. Tension-type headache, not intractable, unspecified chronicity pattern    5. Claudication of both lower extremities    6. Need for hepatitis C screening test    7. Encounter for screening for cardiovascular disorders    8. Disease of bone        Plan:       ESRD on dialysis  Comments:  Had a AV shunt placement left arm working well no complication  Orders:  -     CBC auto differential; Future; Expected date: 08/15/2019  -     Comprehensive metabolic panel; Future; Expected date: 08/15/2019    Bilateral hand pain  -     traMADol (ULTRAM) 50 mg tablet; Take 1 tablet (50 mg total) by mouth every 12 (twelve) hours as needed.  Dispense: 30 tablet; Refill: 0    Arthritis of both hands  -     traMADol (ULTRAM) 50 mg tablet; Take 1 tablet (50 mg total) by mouth every 12 (twelve) hours as needed.  Dispense: 30 tablet; Refill: 0    Tension-type headache, not intractable, unspecified chronicity pattern  -     butalbital-acetaminophen-caffeine -40 mg (FIORICET, ESGIC) -40 mg per tablet; Take 1 tablet by mouth 2 (two) times daily as needed for Pain.  Dispense: 30 tablet; Refill: 2    Claudication of both lower extremities  Comments:  Scheduled for angiogram and possible intervention with the vascular surgeon as [facility] next week    Need for hepatitis C screening test  -     Hepatitis C antibody; Future; Expected date: 08/15/2019    Encounter for screening for cardiovascular disorders  -     Lipid panel; Future;  Expected date: 08/15/2019    Disease of bone  -     Vitamin D; Future; Expected date: 08/15/2019

## 2019-08-15 NOTE — H&P (VIEW-ONLY)
Subjective:       Patient ID: Radha Jurado is a 69 y.o. female.    Chief Complaint: Medication Refill and Hand Pain    HPI patient here to request refill medication for headaches and pain patient has end-stage renal disease on dialysis still has urine output she got dialysis 3 times a week 3 hr itchy section status post AV fistula and left arm doing well but not able to U yet she denies short of breath chest pain dyspnea with exertion the p.o. diet is good bowel movement and is normal patient also had claudication in both her legs scheduled to have angiogram and possible intervention with Dr. srinivasan at [facility] next week  Review of Systems    Objective:      Physical Exam   Constitutional: She is oriented to person, place, and time. She appears well-developed and well-nourished. No distress.   HENT:   Head: Normocephalic and atraumatic.   Right Ear: External ear normal.   Left Ear: External ear normal.   Nose: Nose normal.   Mouth/Throat: Oropharynx is clear and moist. No oropharyngeal exudate.   Eyes: Pupils are equal, round, and reactive to light. Conjunctivae and EOM are normal. Right eye exhibits no discharge. Left eye exhibits no discharge.   Neck: Normal range of motion. Neck supple. No thyromegaly present.   Cardiovascular: Normal rate, regular rhythm, normal heart sounds and intact distal pulses. Exam reveals no gallop and no friction rub.   No murmur heard.  Av fistula left arm have a good thrill no swelling no erythema   Pulmonary/Chest: Effort normal and breath sounds normal. No respiratory distress. She has no wheezes. She has no rales. She exhibits no tenderness.   Abdominal: Soft. Bowel sounds are normal. She exhibits no distension. There is no tenderness. There is no rebound and no guarding.   Musculoskeletal: Normal range of motion. She exhibits no edema or deformity. Tenderness: Tenderness of both hand and right middle finger due to touch trigger finger.   Lymphadenopathy:      She has no cervical adenopathy.   Neurological: She is alert and oriented to person, place, and time.   Skin: Skin is warm and dry. Capillary refill takes less than 2 seconds. No rash noted. No erythema.   PermCath on the right upper chest in place no exudte bleeding   Psychiatric: She has a normal mood and affect. Judgment and thought content normal.   Nursing note and vitals reviewed.      Assessment:       1. ESRD on dialysis    2. Bilateral hand pain    3. Arthritis of both hands    4. Tension-type headache, not intractable, unspecified chronicity pattern    5. Claudication of both lower extremities    6. Need for hepatitis C screening test    7. Encounter for screening for cardiovascular disorders    8. Disease of bone        Plan:       ESRD on dialysis  Comments:  Had a AV shunt placement left arm working well no complication  Orders:  -     CBC auto differential; Future; Expected date: 08/15/2019  -     Comprehensive metabolic panel; Future; Expected date: 08/15/2019    Bilateral hand pain  -     traMADol (ULTRAM) 50 mg tablet; Take 1 tablet (50 mg total) by mouth every 12 (twelve) hours as needed.  Dispense: 30 tablet; Refill: 0    Arthritis of both hands  -     traMADol (ULTRAM) 50 mg tablet; Take 1 tablet (50 mg total) by mouth every 12 (twelve) hours as needed.  Dispense: 30 tablet; Refill: 0    Tension-type headache, not intractable, unspecified chronicity pattern  -     butalbital-acetaminophen-caffeine -40 mg (FIORICET, ESGIC) -40 mg per tablet; Take 1 tablet by mouth 2 (two) times daily as needed for Pain.  Dispense: 30 tablet; Refill: 2    Claudication of both lower extremities  Comments:  Scheduled for angiogram and possible intervention with the vascular surgeon as [facility] next week    Need for hepatitis C screening test  -     Hepatitis C antibody; Future; Expected date: 08/15/2019    Encounter for screening for cardiovascular disorders  -     Lipid panel; Future;  Expected date: 08/15/2019    Disease of bone  -     Vitamin D; Future; Expected date: 08/15/2019

## 2019-08-19 ENCOUNTER — HOSPITAL ENCOUNTER (OUTPATIENT)
Dept: PREADMISSION TESTING | Facility: HOSPITAL | Age: 69
Discharge: HOME | DRG: 252 | End: 2019-08-19
Attending: THORACIC SURGERY (CARDIOTHORACIC VASCULAR SURGERY)
Payer: MEDICARE

## 2019-08-19 ENCOUNTER — HOSPITAL ENCOUNTER (OUTPATIENT)
Dept: RADIOLOGY | Facility: HOSPITAL | Age: 69
Discharge: HOME | DRG: 252 | End: 2019-08-19
Attending: THORACIC SURGERY (CARDIOTHORACIC VASCULAR SURGERY)
Payer: MEDICARE

## 2019-08-19 VITALS
BODY MASS INDEX: 19.86 KG/M2 | RESPIRATION RATE: 20 BRPM | DIASTOLIC BLOOD PRESSURE: 63 MMHG | WEIGHT: 105.19 LBS | OXYGEN SATURATION: 99 % | HEIGHT: 61 IN | HEART RATE: 66 BPM | SYSTOLIC BLOOD PRESSURE: 159 MMHG | TEMPERATURE: 97 F

## 2019-08-19 DIAGNOSIS — I73.9 PERIPHERAL VASCULAR DISEASE, UNSPECIFIED: ICD-10-CM

## 2019-08-19 DIAGNOSIS — Z01.818 PRE-OP TESTING: ICD-10-CM

## 2019-08-19 DIAGNOSIS — Z01.818 PRE-OP TESTING: Primary | ICD-10-CM

## 2019-08-19 PROCEDURE — 71046 X-RAY EXAM CHEST 2 VIEWS: CPT | Mod: TC

## 2019-08-19 PROCEDURE — 93005 ELECTROCARDIOGRAM TRACING: CPT

## 2019-08-19 PROCEDURE — 86920 COMPATIBILITY TEST SPIN: CPT

## 2019-08-19 PROCEDURE — P9016 RBC LEUKOCYTES REDUCED: HCPCS

## 2019-08-19 RX ORDER — POTASSIUM CHLORIDE 20 MEQ/1
40 TABLET, EXTENDED RELEASE ORAL ONCE
Status: CANCELLED | OUTPATIENT
Start: 2019-08-20

## 2019-08-19 NOTE — PRE-PROCEDURE INSTRUCTIONS
Received call from Jany at Dr. Bello's office re. K+2.8- was told Dr. Bello aware and will order potassium day of surgery.

## 2019-08-19 NOTE — DISCHARGE INSTRUCTIONS
To confirm, Your doctor has instructed you that surgery is scheduled for: Tuesday, August 20, 2019    Please report to [facility] on [address] the morning of surgery at 5:30 AM     MEDICATIONS:  TAKE ONLY THESE MEDICATIONS WITH A SMALL SIP OF WATER THE MORNING OF YOUR PROCEDURE: Inhalers    DO NOT TAKE THESE MEDICATIONS 5-7 DAYS PRIOR to your procedure or per your surgeon's request: ASPIRIN, ALEVE, ADVIL, IBUPROFEN, FISH OIL VITAMIN E, HERBALS  (May take Tylenol)    ONLY if you are prescribed any types of blood thinners such as:  Aspirin, Coumadin, Plavix, Pradaxa, Xarelto, Aggrenox, Effient, Eliquis, Savasya, Brilinta, or any other, ask your surgeon whether you should stop taking them and how long before surgery you should stop.  You may also need to verify with the prescribing physician if it is ok to stop your medication.      INSTRUCTIONS IMPORTANT!!  · Do not eat or drink anything between midnight and the time of your procedure- this includes gum, mints, and candy.  · Do not smoke, vape or drink alcoholic beverages 24 hours prior to your procedure.  · Shower the night before AND the morning of your procedure with a Chlorhexidine wash such as Hibiclens or Dial antibacterial soap from the neck down.  Do not get it on your face or in your eyes.  You may use your own shampoo and face wash. This helps your skin to be as bacteria free as possible.    · If you wear contact lenses, dentures, hearing aids or glasses, bring a container to put them in during surgery and give to a family member for safe keeping.  Please leave all jewelry, piercing's and valuables at home.   · DO NOT remove hair from the surgery site.  Do not shave the incision site unless you are given specific instructions to do so.     · If your doctor has scheduled you for an overnight stay, bring a small overnight bag with any personal items you need.  · Make arrangements in advance for transportation home by a responsible adult.  · You must  make arrangements for transportation, TAXI'S, UBER'S OR LYFTS ARE NOT ALLOWED.          If you have any questions about these instructions, call Pre-Op Admit  Nursing at [phone] or the Pre-Op Day Surgery Unit at [phone].

## 2019-08-20 ENCOUNTER — ANESTHESIA EVENT (OUTPATIENT)
Dept: SURGERY | Facility: HOSPITAL | Age: 69
DRG: 252 | End: 2019-08-20
Payer: MEDICARE

## 2019-08-20 ENCOUNTER — HOSPITAL ENCOUNTER (INPATIENT)
Facility: HOSPITAL | Age: 69
LOS: 3 days | Discharge: HOME | DRG: 252 | End: 2019-08-23
Attending: THORACIC SURGERY (CARDIOTHORACIC VASCULAR SURGERY) | Admitting: THORACIC SURGERY (CARDIOTHORACIC VASCULAR SURGERY)
Payer: MEDICARE

## 2019-08-20 ENCOUNTER — ANESTHESIA (OUTPATIENT)
Dept: SURGERY | Facility: HOSPITAL | Age: 69
DRG: 252 | End: 2019-08-20
Payer: MEDICARE

## 2019-08-20 DIAGNOSIS — I73.9 PERIPHERAL VASCULAR DISEASE, UNSPECIFIED: ICD-10-CM

## 2019-08-20 DIAGNOSIS — E87.6 HYPOKALEMIA: Primary | ICD-10-CM

## 2019-08-20 LAB
ANION GAP SERPL CALC-SCNC: 8 MMOL/L (ref 8–16)
BUN SERPL-MCNC: 37 MG/DL (ref 8–23)
CALCIUM SERPL-MCNC: 7.4 MG/DL (ref 8.7–10.5)
CHLORIDE SERPL-SCNC: 104 MMOL/L (ref 95–110)
CO2 SERPL-SCNC: 22 MMOL/L (ref 23–29)
CREAT SERPL-MCNC: 4.1 MG/DL (ref 0.5–1.4)
ERYTHROCYTE [DISTWIDTH] IN BLOOD BY AUTOMATED COUNT: 15.8 % (ref 11.5–14.5)
EST. GFR  (AFRICAN AMERICAN): 12 ML/MIN/1.73 M^2
EST. GFR  (NON AFRICAN AMERICAN): 10.4 ML/MIN/1.73 M^2
GLUCOSE SERPL-MCNC: 110 MG/DL (ref 70–110)
GLUCOSE SERPL-MCNC: 151 MG/DL (ref 70–110)
HCO3 UR-SCNC: 23.7 MMOL/L (ref 24–28)
HCT VFR BLD AUTO: 29.9 % (ref 37–48.5)
HCT VFR BLD CALC: 26 %PCV (ref 36–54)
HGB BLD-MCNC: 9.7 G/DL (ref 12–16)
MCH RBC QN AUTO: 31.7 PG (ref 27–31)
MCHC RBC AUTO-ENTMCNC: 32.4 G/DL (ref 32–36)
MCV RBC AUTO: 98 FL (ref 82–98)
PCO2 BLDA: 45 MMHG (ref 35–45)
PH SMN: 7.33 [PH] (ref 7.35–7.45)
PLATELET # BLD AUTO: 201 K/UL (ref 150–350)
PMV BLD AUTO: 9.6 FL (ref 9.2–12.9)
PO2 BLDA: 165 MMHG (ref 80–100)
POC ACTIVATED CLOTTING TIME K: 147 SEC (ref 74–137)
POC ACTIVATED CLOTTING TIME K: 147 SEC (ref 74–137)
POC ACTIVATED CLOTTING TIME K: 296 SEC (ref 74–137)
POC ACTIVATED CLOTTING TIME K: 417 SEC (ref 74–137)
POC BE: -2 MMOL/L
POC IONIZED CALCIUM: 1.07 MMOL/L (ref 1.06–1.42)
POC SATURATED O2: 99 % (ref 95–100)
POC TCO2: 25 MMOL/L (ref 23–27)
POTASSIUM BLD-SCNC: 3.1 MMOL/L (ref 3.5–5.1)
POTASSIUM SERPL-SCNC: 2.9 MMOL/L (ref 3.5–5.1)
POTASSIUM SERPL-SCNC: 3.1 MMOL/L (ref 3.5–5.1)
RBC # BLD AUTO: 3.06 M/UL (ref 4–5.4)
SAMPLE: ABNORMAL
SODIUM BLD-SCNC: 136 MMOL/L (ref 136–145)
SODIUM SERPL-SCNC: 134 MMOL/L (ref 136–145)
WBC # BLD AUTO: 11.85 K/UL (ref 3.9–12.7)

## 2019-08-20 PROCEDURE — P9016 RBC LEUKOCYTES REDUCED: HCPCS

## 2019-08-20 PROCEDURE — 94761 N-INVAS EAR/PLS OXIMETRY MLT: CPT

## 2019-08-20 PROCEDURE — 27100025 HC TUBING, SET FLUID WARMER: Performed by: ANESTHESIOLOGY

## 2019-08-20 PROCEDURE — 25000003 PHARM REV CODE 250

## 2019-08-20 PROCEDURE — 36000709 HC OR TIME LEV III EA ADD 15 MIN: Performed by: THORACIC SURGERY (CARDIOTHORACIC VASCULAR SURGERY)

## 2019-08-20 PROCEDURE — 25000003 PHARM REV CODE 250: Performed by: THORACIC SURGERY (CARDIOTHORACIC VASCULAR SURGERY)

## 2019-08-20 PROCEDURE — 27200677 HC TRANSDUCER MONITOR KIT SINGLE: Performed by: ANESTHESIOLOGY

## 2019-08-20 PROCEDURE — 27202107 HC XP QUATRO SENSOR: Performed by: ANESTHESIOLOGY

## 2019-08-20 PROCEDURE — S0028 INJECTION, FAMOTIDINE, 20 MG: HCPCS | Performed by: NURSE ANESTHETIST, CERTIFIED REGISTERED

## 2019-08-20 PROCEDURE — 94640 AIRWAY INHALATION TREATMENT: CPT

## 2019-08-20 PROCEDURE — 27000666 HC INFUSOR PRESSURE BAG: Performed by: ANESTHESIOLOGY

## 2019-08-20 PROCEDURE — 27000671 HC TUBING MICROBORE EXT: Performed by: ANESTHESIOLOGY

## 2019-08-20 PROCEDURE — 63600175 PHARM REV CODE 636 W HCPCS: Performed by: THORACIC SURGERY (CARDIOTHORACIC VASCULAR SURGERY)

## 2019-08-20 PROCEDURE — 63600175 PHARM REV CODE 636 W HCPCS: Performed by: NURSE ANESTHETIST, CERTIFIED REGISTERED

## 2019-08-20 PROCEDURE — 85027 COMPLETE CBC AUTOMATED: CPT

## 2019-08-20 PROCEDURE — C1768 GRAFT, VASCULAR: HCPCS | Performed by: THORACIC SURGERY (CARDIOTHORACIC VASCULAR SURGERY)

## 2019-08-20 PROCEDURE — 25000242 PHARM REV CODE 250 ALT 637 W/ HCPCS: Performed by: THORACIC SURGERY (CARDIOTHORACIC VASCULAR SURGERY)

## 2019-08-20 PROCEDURE — 36430 TRANSFUSION BLD/BLD COMPNT: CPT

## 2019-08-20 PROCEDURE — 25000003 PHARM REV CODE 250: Performed by: NURSE ANESTHETIST, CERTIFIED REGISTERED

## 2019-08-20 PROCEDURE — 27000658 HC ARTERIAL LINE ALL: Performed by: ANESTHESIOLOGY

## 2019-08-20 PROCEDURE — 84132 ASSAY OF SERUM POTASSIUM: CPT

## 2019-08-20 PROCEDURE — 27201423 OPTIME MED/SURG SUP & DEVICES STERILE SUPPLY: Performed by: THORACIC SURGERY (CARDIOTHORACIC VASCULAR SURGERY)

## 2019-08-20 PROCEDURE — 20000000 HC ICU ROOM

## 2019-08-20 PROCEDURE — 27201107 HC STYLET, STANDARD: Performed by: ANESTHESIOLOGY

## 2019-08-20 PROCEDURE — 27000673 HC TUBING BLOOD Y: Performed by: ANESTHESIOLOGY

## 2019-08-20 PROCEDURE — 37000008 HC ANESTHESIA 1ST 15 MINUTES: Performed by: THORACIC SURGERY (CARDIOTHORACIC VASCULAR SURGERY)

## 2019-08-20 PROCEDURE — 36000708 HC OR TIME LEV III 1ST 15 MIN: Performed by: THORACIC SURGERY (CARDIOTHORACIC VASCULAR SURGERY)

## 2019-08-20 PROCEDURE — 80048 BASIC METABOLIC PNL TOTAL CA: CPT

## 2019-08-20 PROCEDURE — 27000675 HC TUBING MICRODRIP: Performed by: ANESTHESIOLOGY

## 2019-08-20 PROCEDURE — 35654 BPG AXILLARY-FEMORAL-FEMORAL: CPT | Mod: ,,, | Performed by: THORACIC SURGERY (CARDIOTHORACIC VASCULAR SURGERY)

## 2019-08-20 PROCEDURE — 37000009 HC ANESTHESIA EA ADD 15 MINS: Performed by: THORACIC SURGERY (CARDIOTHORACIC VASCULAR SURGERY)

## 2019-08-20 PROCEDURE — 35654 PR BYPASS GRAFT OTHR,AXILL-FEM-FEM: ICD-10-PCS | Mod: ,,, | Performed by: THORACIC SURGERY (CARDIOTHORACIC VASCULAR SURGERY)

## 2019-08-20 DEVICE — GRAFT PROPATEN 6X80X60 HT066080A: Type: IMPLANTABLE DEVICE | Site: FEMUR | Status: FUNCTIONAL

## 2019-08-20 RX ORDER — IPRATROPIUM BROMIDE AND ALBUTEROL SULFATE 2.5; .5 MG/3ML; MG/3ML
3 SOLUTION RESPIRATORY (INHALATION)
Status: DISCONTINUED | OUTPATIENT
Start: 2019-08-20 | End: 2019-08-20 | Stop reason: SDUPTHER

## 2019-08-20 RX ORDER — HEPARIN SODIUM 1000 [USP'U]/ML
INJECTION, SOLUTION INTRAVENOUS; SUBCUTANEOUS
Status: DISCONTINUED | OUTPATIENT
Start: 2019-08-20 | End: 2019-08-20

## 2019-08-20 RX ORDER — HYDROMORPHONE HYDROCHLORIDE 1 MG/ML
1 INJECTION, SOLUTION INTRAMUSCULAR; INTRAVENOUS; SUBCUTANEOUS EVERY 4 HOURS PRN
Status: DISCONTINUED | OUTPATIENT
Start: 2019-08-20 | End: 2019-08-23 | Stop reason: HOSPADM

## 2019-08-20 RX ORDER — BACITRACIN 50000 [IU]/1
INJECTION, POWDER, FOR SOLUTION INTRAMUSCULAR
Status: DISCONTINUED | OUTPATIENT
Start: 2019-08-20 | End: 2019-08-20 | Stop reason: HOSPADM

## 2019-08-20 RX ORDER — FENTANYL CITRATE 50 UG/ML
INJECTION, SOLUTION INTRAMUSCULAR; INTRAVENOUS
Status: DISCONTINUED | OUTPATIENT
Start: 2019-08-20 | End: 2019-08-20

## 2019-08-20 RX ORDER — HEPARIN SODIUM 1000 [USP'U]/ML
2100 INJECTION, SOLUTION INTRAVENOUS; SUBCUTANEOUS ONCE
Status: COMPLETED | OUTPATIENT
Start: 2019-08-20 | End: 2019-08-20

## 2019-08-20 RX ORDER — CLOPIDOGREL BISULFATE 75 MG/1
75 TABLET ORAL DAILY
Status: DISCONTINUED | OUTPATIENT
Start: 2019-08-20 | End: 2019-08-23 | Stop reason: HOSPADM

## 2019-08-20 RX ORDER — PANTOPRAZOLE SODIUM 20 MG/1
20 TABLET, DELAYED RELEASE ORAL
Status: DISCONTINUED | OUTPATIENT
Start: 2019-08-21 | End: 2019-08-23 | Stop reason: HOSPADM

## 2019-08-20 RX ORDER — HEPARIN SODIUM 1000 [USP'U]/ML
INJECTION, SOLUTION INTRAVENOUS; SUBCUTANEOUS
Status: DISCONTINUED | OUTPATIENT
Start: 2019-08-20 | End: 2019-08-20 | Stop reason: HOSPADM

## 2019-08-20 RX ORDER — DEXAMETHASONE SODIUM PHOSPHATE 4 MG/ML
INJECTION, SOLUTION INTRA-ARTICULAR; INTRALESIONAL; INTRAMUSCULAR; INTRAVENOUS; SOFT TISSUE
Status: DISCONTINUED | OUTPATIENT
Start: 2019-08-20 | End: 2019-08-20

## 2019-08-20 RX ORDER — SODIUM CHLORIDE 0.9 % (FLUSH) 0.9 %
10 SYRINGE (ML) INJECTION
Status: DISCONTINUED | OUTPATIENT
Start: 2019-08-20 | End: 2019-08-23 | Stop reason: HOSPADM

## 2019-08-20 RX ORDER — MUPIROCIN 20 MG/G
OINTMENT TOPICAL
Status: DISCONTINUED | OUTPATIENT
Start: 2019-08-20 | End: 2019-08-20 | Stop reason: HOSPADM

## 2019-08-20 RX ORDER — LIDOCAINE HYDROCHLORIDE 20 MG/ML
INJECTION, SOLUTION EPIDURAL; INFILTRATION; INTRACAUDAL; PERINEURAL
Status: DISCONTINUED | OUTPATIENT
Start: 2019-08-20 | End: 2019-08-20

## 2019-08-20 RX ORDER — SODIUM CHLORIDE 9 MG/ML
INJECTION, SOLUTION INTRAVENOUS CONTINUOUS PRN
Status: DISCONTINUED | OUTPATIENT
Start: 2019-08-20 | End: 2019-08-20

## 2019-08-20 RX ORDER — SODIUM CHLORIDE 9 MG/ML
INJECTION, SOLUTION INTRAVENOUS CONTINUOUS
Status: DISCONTINUED | OUTPATIENT
Start: 2019-08-20 | End: 2019-08-23

## 2019-08-20 RX ORDER — CEFAZOLIN SODIUM 2 G/50ML
2 SOLUTION INTRAVENOUS
Status: COMPLETED | OUTPATIENT
Start: 2019-08-20 | End: 2019-08-20

## 2019-08-20 RX ORDER — EPHEDRINE SULFATE 50 MG/ML
INJECTION, SOLUTION INTRAVENOUS
Status: DISCONTINUED | OUTPATIENT
Start: 2019-08-20 | End: 2019-08-20

## 2019-08-20 RX ORDER — PHENYLEPHRINE HYDROCHLORIDE 10 MG/ML
INJECTION INTRAVENOUS
Status: DISCONTINUED | OUTPATIENT
Start: 2019-08-20 | End: 2019-08-20

## 2019-08-20 RX ORDER — ASPIRIN 81 MG/1
81 TABLET ORAL DAILY
Status: DISCONTINUED | OUTPATIENT
Start: 2019-08-20 | End: 2019-08-23 | Stop reason: HOSPADM

## 2019-08-20 RX ORDER — POTASSIUM CHLORIDE 7.45 MG/ML
10 INJECTION INTRAVENOUS ONCE
Status: COMPLETED | OUTPATIENT
Start: 2019-08-20 | End: 2019-08-20

## 2019-08-20 RX ORDER — PROPOFOL 10 MG/ML
VIAL (ML) INTRAVENOUS
Status: DISCONTINUED | OUTPATIENT
Start: 2019-08-20 | End: 2019-08-20

## 2019-08-20 RX ORDER — KETAMINE HYDROCHLORIDE 10 MG/ML
INJECTION, SOLUTION INTRAMUSCULAR; INTRAVENOUS
Status: DISCONTINUED | OUTPATIENT
Start: 2019-08-20 | End: 2019-08-20

## 2019-08-20 RX ORDER — OXYCODONE HYDROCHLORIDE 5 MG/1
5 TABLET ORAL EVERY 4 HOURS PRN
Status: DISCONTINUED | OUTPATIENT
Start: 2019-08-20 | End: 2019-08-23 | Stop reason: HOSPADM

## 2019-08-20 RX ORDER — ONDANSETRON HYDROCHLORIDE 2 MG/ML
INJECTION, SOLUTION INTRAMUSCULAR; INTRAVENOUS
Status: DISCONTINUED | OUTPATIENT
Start: 2019-08-20 | End: 2019-08-20

## 2019-08-20 RX ORDER — ATORVASTATIN CALCIUM 20 MG/1
20 TABLET, FILM COATED ORAL DAILY
Status: DISCONTINUED | OUTPATIENT
Start: 2019-08-20 | End: 2019-08-23 | Stop reason: HOSPADM

## 2019-08-20 RX ORDER — HYDROCODONE BITARTRATE AND ACETAMINOPHEN 500; 5 MG/1; MG/1
TABLET ORAL
Status: DISCONTINUED | OUTPATIENT
Start: 2019-08-20 | End: 2019-08-20 | Stop reason: HOSPADM

## 2019-08-20 RX ORDER — HYDRALAZINE HYDROCHLORIDE 25 MG/1
50 TABLET, FILM COATED ORAL 3 TIMES DAILY
Status: DISCONTINUED | OUTPATIENT
Start: 2019-08-20 | End: 2019-08-23 | Stop reason: HOSPADM

## 2019-08-20 RX ORDER — CHLORHEXIDINE GLUCONATE ORAL RINSE 1.2 MG/ML
10 SOLUTION DENTAL
Status: DISCONTINUED | OUTPATIENT
Start: 2019-08-20 | End: 2019-08-20 | Stop reason: HOSPADM

## 2019-08-20 RX ORDER — HEPARIN SODIUM 1000 [USP'U]/ML
1900 INJECTION, SOLUTION INTRAVENOUS; SUBCUTANEOUS ONCE
Status: COMPLETED | OUTPATIENT
Start: 2019-08-20 | End: 2019-08-20

## 2019-08-20 RX ORDER — MUPIROCIN 20 MG/G
OINTMENT TOPICAL 2 TIMES DAILY
Status: DISCONTINUED | OUTPATIENT
Start: 2019-08-20 | End: 2019-08-23 | Stop reason: HOSPADM

## 2019-08-20 RX ORDER — AMLODIPINE BESYLATE 5 MG/1
10 TABLET ORAL NIGHTLY
Status: DISCONTINUED | OUTPATIENT
Start: 2019-08-20 | End: 2019-08-23 | Stop reason: HOSPADM

## 2019-08-20 RX ORDER — LIDOCAINE HYDROCHLORIDE 10 MG/ML
1 INJECTION, SOLUTION EPIDURAL; INFILTRATION; INTRACAUDAL; PERINEURAL ONCE
Status: DISCONTINUED | OUTPATIENT
Start: 2019-08-20 | End: 2019-08-20 | Stop reason: HOSPADM

## 2019-08-20 RX ORDER — METOPROLOL SUCCINATE 50 MG/1
50 TABLET, EXTENDED RELEASE ORAL DAILY
Status: DISCONTINUED | OUTPATIENT
Start: 2019-08-20 | End: 2019-08-23 | Stop reason: HOSPADM

## 2019-08-20 RX ORDER — FAMOTIDINE 10 MG/ML
INJECTION INTRAVENOUS
Status: DISCONTINUED | OUTPATIENT
Start: 2019-08-20 | End: 2019-08-20

## 2019-08-20 RX ORDER — PAROXETINE HYDROCHLORIDE 20 MG/1
20 TABLET, FILM COATED ORAL EVERY MORNING
Status: DISCONTINUED | OUTPATIENT
Start: 2019-08-20 | End: 2019-08-23 | Stop reason: HOSPADM

## 2019-08-20 RX ORDER — ROCURONIUM BROMIDE 10 MG/ML
INJECTION, SOLUTION INTRAVENOUS
Status: DISCONTINUED | OUTPATIENT
Start: 2019-08-20 | End: 2019-08-20

## 2019-08-20 RX ORDER — CHLORHEXIDINE GLUCONATE ORAL RINSE 1.2 MG/ML
15 SOLUTION DENTAL 2 TIMES DAILY
Status: DISCONTINUED | OUTPATIENT
Start: 2019-08-20 | End: 2019-08-23 | Stop reason: HOSPADM

## 2019-08-20 RX ORDER — LEVALBUTEROL INHALATION SOLUTION 0.63 MG/3ML
0.63 SOLUTION RESPIRATORY (INHALATION)
Status: DISCONTINUED | OUTPATIENT
Start: 2019-08-20 | End: 2019-08-22

## 2019-08-20 RX ORDER — ACETAMINOPHEN 10 MG/ML
INJECTION, SOLUTION INTRAVENOUS
Status: DISCONTINUED | OUTPATIENT
Start: 2019-08-20 | End: 2019-08-20

## 2019-08-20 RX ADMIN — SUGAMMADEX 200 MG: 100 INJECTION, SOLUTION INTRAVENOUS at 09:08

## 2019-08-20 RX ADMIN — FENTANYL CITRATE 50 MCG: 50 INJECTION, SOLUTION INTRAMUSCULAR; INTRAVENOUS at 07:08

## 2019-08-20 RX ADMIN — ATORVASTATIN CALCIUM 20 MG: 20 TABLET, FILM COATED ORAL at 03:08

## 2019-08-20 RX ADMIN — HEPARIN SODIUM 2100 UNITS: 1000 INJECTION, SOLUTION INTRAVENOUS; SUBCUTANEOUS at 04:08

## 2019-08-20 RX ADMIN — OXYCODONE HYDROCHLORIDE 5 MG: 5 TABLET ORAL at 09:08

## 2019-08-20 RX ADMIN — HEPARIN SODIUM 1900 UNITS: 1000 INJECTION, SOLUTION INTRAVENOUS; SUBCUTANEOUS at 04:08

## 2019-08-20 RX ADMIN — MUPIROCIN: 20 OINTMENT TOPICAL at 09:08

## 2019-08-20 RX ADMIN — EPHEDRINE SULFATE 5 MG: 50 INJECTION, SOLUTION INTRAVENOUS at 08:08

## 2019-08-20 RX ADMIN — SODIUM CHLORIDE: 0.9 INJECTION, SOLUTION INTRAVENOUS at 12:08

## 2019-08-20 RX ADMIN — DEXAMETHASONE SODIUM PHOSPHATE 4 MG: 4 INJECTION, SOLUTION INTRAMUSCULAR; INTRAVENOUS at 09:08

## 2019-08-20 RX ADMIN — ONDANSETRON 4 MG: 2 INJECTION, SOLUTION INTRAMUSCULAR; INTRAVENOUS at 07:08

## 2019-08-20 RX ADMIN — PHENYLEPHRINE HYDROCHLORIDE 200 MCG: 10 INJECTION INTRAVENOUS at 08:08

## 2019-08-20 RX ADMIN — FAMOTIDINE 20 MG: 10 INJECTION, SOLUTION INTRAVENOUS at 07:08

## 2019-08-20 RX ADMIN — HEPARIN SODIUM 10000 UNITS: 1000 INJECTION, SOLUTION INTRAVENOUS; SUBCUTANEOUS at 08:08

## 2019-08-20 RX ADMIN — PAROXETINE HYDROCHLORIDE 20 MG: 20 TABLET, FILM COATED ORAL at 03:08

## 2019-08-20 RX ADMIN — SODIUM CHLORIDE: 0.9 INJECTION, SOLUTION INTRAVENOUS at 07:08

## 2019-08-20 RX ADMIN — OXYCODONE HYDROCHLORIDE 5 MG: 5 TABLET ORAL at 01:08

## 2019-08-20 RX ADMIN — PROPOFOL 100 MG: 10 INJECTION, EMULSION INTRAVENOUS at 07:08

## 2019-08-20 RX ADMIN — LIDOCAINE HYDROCHLORIDE 100 MG: 20 INJECTION, SOLUTION EPIDURAL; INFILTRATION; INTRACAUDAL; PERINEURAL at 07:08

## 2019-08-20 RX ADMIN — LEVALBUTEROL HYDROCHLORIDE 0.63 MG: 0.63 SOLUTION RESPIRATORY (INHALATION) at 07:08

## 2019-08-20 RX ADMIN — ROCURONIUM BROMIDE 20 MG: 10 INJECTION, SOLUTION INTRAVENOUS at 07:08

## 2019-08-20 RX ADMIN — KETAMINE HYDROCHLORIDE 10 MG: 10 INJECTION, SOLUTION INTRAMUSCULAR; INTRAVENOUS at 08:08

## 2019-08-20 RX ADMIN — CHLORHEXIDINE GLUCONATE 15 ML: 1.2 RINSE ORAL at 09:08

## 2019-08-20 RX ADMIN — ACETAMINOPHEN 750 MG: 10 INJECTION, SOLUTION INTRAVENOUS at 07:08

## 2019-08-20 RX ADMIN — HYDROMORPHONE HYDROCHLORIDE 1 MG: 1 INJECTION, SOLUTION INTRAMUSCULAR; INTRAVENOUS; SUBCUTANEOUS at 11:08

## 2019-08-20 RX ADMIN — ROCURONIUM BROMIDE 20 MG: 10 INJECTION, SOLUTION INTRAVENOUS at 08:08

## 2019-08-20 RX ADMIN — POTASSIUM CHLORIDE 10 MEQ: 7.46 INJECTION, SOLUTION INTRAVENOUS at 01:08

## 2019-08-20 RX ADMIN — Medication 1 G: at 07:08

## 2019-08-20 RX ADMIN — CEFAZOLIN SODIUM 2 G: 2 SOLUTION INTRAVENOUS at 07:08

## 2019-08-20 RX ADMIN — ROCURONIUM BROMIDE 10 MG: 10 INJECTION, SOLUTION INTRAVENOUS at 07:08

## 2019-08-20 RX ADMIN — KETAMINE HYDROCHLORIDE 20 MG: 10 INJECTION, SOLUTION INTRAMUSCULAR; INTRAVENOUS at 07:08

## 2019-08-20 RX ADMIN — HYDROMORPHONE HYDROCHLORIDE 1 MG: 1 INJECTION, SOLUTION INTRAMUSCULAR; INTRAVENOUS; SUBCUTANEOUS at 05:08

## 2019-08-20 RX ADMIN — PHENYLEPHRINE HYDROCHLORIDE 200 MCG: 10 INJECTION INTRAVENOUS at 09:08

## 2019-08-20 RX ADMIN — LEVALBUTEROL HYDROCHLORIDE 0.63 MG: 0.63 SOLUTION RESPIRATORY (INHALATION) at 01:08

## 2019-08-20 NOTE — ANESTHESIA PREPROCEDURE EVALUATION
08/20/2019  Radha Jurado is a 69 y.o., female.    Anesthesia Evaluation    I have reviewed the Patient Summary Reports.     I have reviewed the Medications.     Review of Systems  Anesthesia Hx:  History of prior surgery of interest to airway management or planning: Denies Family Hx of Anesthesia complications.   Denies Personal Hx of Anesthesia complications.   Social:  Smoker    Hematology/Oncology:     Oncology Normal    -- Anemia: Hematology Comments: H/o transfusion    EENT/Dental:EENT/Dental Normal   Cardiovascular:   Hypertension Past MI (2008) CAD  CABG/stent  Angina (Dr De La Fuente aware, occ SL NTG) CHF (on chart but pt denies.  Recent EF 65% )    Pulmonary:   COPD Asthma Denies Recent URI.  Denies Sleep Apnea. No home oxygen  MDI's prn only   Renal/:   Chronic Renal Disease, ESRD, Dialysis Last dialysis 8/19   Hepatic/GI:   PUD, Hepatitis (treated and resolved, per pt. Pt says she is allowed to take Tylenol.), C    Musculoskeletal:   Arthritis (hands and back)     Neurological:   Headaches   Peripheral Neuropathy (LLE numbness)    Psych:   Psychiatric History depression          Physical Exam  General:  Cachexia    Airway/Jaw/Neck:  Airway Findings: Mouth Opening: Normal Tongue: Normal  Mallampati: I  TM Distance: Normal, at least 6 cm  Jaw/Neck Findings:  Neck ROM: Normal ROM      Dental:  Dental Findings: Edentulous   Chest/Lungs:  Chest/Lungs Findings: Clear to auscultation     Heart/Vascular:  Heart Findings: Rate: Normal  Rhythm: Regular Rhythm  Sounds: Normal  Heart Murmur  Systolic        Mental Status:  Mental Status Findings:  Cooperative, Alert and Oriented         Anesthesia Plan  Type of Anesthesia, risks & benefits discussed:  Anesthesia Type:  general  Patient's Preference:   Intra-op Monitoring Plan: arterial line, central line and standard ASA monitors  Intra-op Monitoring Plan  Comments:   Post Op Pain Control Plan: multimodal analgesia  Post Op Pain Control Plan Comments:   Induction:   IV  Beta Blocker:         Informed Consent: Patient understands risks and agrees with Anesthesia plan.  Questions answered. Anesthesia consent signed with patient.  ASA Score: 4     Day of Surgery Review of History & Physical:        Anesthesia Plan Notes: Scott Gilliam, surgeon wants us to use hemosplit instead of placing TLC, ketamine, Ofirmev        Ready For Surgery From Anesthesia Perspective.

## 2019-08-20 NOTE — ANESTHESIA POSTPROCEDURE EVALUATION
Anesthesia Post Evaluation    Patient: Radha Jurado    Procedure(s) Performed: Procedure(s) (LRB):  CREATION, BYPASS, ARTERIAL, AXILLARY TO FEMORAL, USING GRAFT (Right)    Final Anesthesia Type: general  Patient location during evaluation: ICU  Patient participation: Yes- Able to Participate  Level of consciousness: awake and alert  Post-procedure vital signs: reviewed and stable  Pain management: adequate  Airway patency: patent  PONV status at discharge: No PONV  Anesthetic complications: no      Cardiovascular status: stable  Respiratory status: unassisted and face mask  Hydration status: euvolemic  Follow-up not needed.          Vitals Value Taken Time   /56 8/20/2019 10:15 AM   Temp 36.8 °C (98.3 °F) 8/20/2019  6:14 AM   Pulse 73 8/20/2019 10:19 AM   Resp 27 8/20/2019 10:19 AM   SpO2 100 % 8/20/2019 10:19 AM   Vitals shown include unvalidated device data.      No case tracking events are documented in the log.      Pain/Chela Score: No data recorded

## 2019-08-20 NOTE — OP NOTE
This is Dr. Bello dictating an operative note with the date of service being 20th August 2019.  Preoperative diagnosis:  Peripheral arterial disease with disabling limb claudication bilaterally.  Postoperative diagnosis same.  Operation:  Right axillo femoral bypass with femoral femoral bypass both using a 6 mm [place] ring enforced PTFE prosthesis.  Operation in detail:  The patient brought to the operating room and prepped and draped in the usual sterile fashion.  Transverse incisions were made in the groins just below the groin creases bilaterally to expose the common femoral arteries.  This was done with sharp and cautery dissection.  This extended from the femoral artery proximally to the bifurcation distally on both sides.  The vessels were satisfactory.  A counter incision was made below the right clavicle in a transverse fashion.  This was approximately 2 cm below the clavicle extending from its junction of the middle and lateral thirds laterally for 3-4 cm.  Incision was carried down to the fascia with the cautery.  The pectoralis major fascia was incised and the muscle partially divided.  This allowed access to 1st the axillary vein which was carefully mobilized.  The axillary artery was slightly posterior and inferior and controlled proximally and distally.  Tunnels were created between the incisions passing the 6 mm ringed graft from the axillary incision to the right groin incision and then a graft was passed from the right to the left groin incision just above the abdominal wall fascia.  The patient was heparinized.  The axillary artery was then doubly clamped and then opened and then to the opening was sewn the 6 mm graft in an end-to-side fashion with running 5 0 Prolene suture.  The graft was measured to length and cut and sewn distally to the femoral artery in an end-to-side fashion with running 5 0 Prolene suture as well.  These anastomoses were reinforced were necessary with interrupted 5 0  Prolene suture.  An opening was made in the graft to accommodate the proximal anastomosis for the femoral femoral portion of the bypass.  The graft was sewn in an end-to-side fashion to the axillo femoral graft with running 5 0 Prolene suture as well.  The graft was measured to length and cut and then sewn distally to the left common femoral artery in end-to-side fashion with running 5 0 Prolene suture.  The grafts were de-aired in forward flow established.  These anastomoses were reinforced where necessary with interrupted 5 0 Prolene suture.  The heparin was reversed with protamine.  A small amount of Tisseel was placed over the anastomosis for hemostasis.  Overall the patient tolerated the procedure well up to this point.  Closure of the wounds was in layers with to 0 Monocryl stitch on the fascia and subcutaneous tissues in a running fashion.  The skin was closed with running 3 0 Monocryl stitch over the axillary incision and the left groin incision while the right groin incision was closed with a combination of to 0 interrupted vertical mattress nylon sutures and staples.  Estimated blood loss from the procedure was approximately 600 cc in the patient brought to the intensive care unit in satisfactory condition.

## 2019-08-20 NOTE — ANESTHESIA PROCEDURE NOTES
Arterial    Diagnosis: PVD    Patient location during procedure: done in OR  Procedure start time: 8/20/2019 7:16 AM  Timeout: 8/20/2019 7:15 AM  Procedure end time: 8/20/2019 7:21 AM    Staffing  Authorizing Provider: Fer Rangel MD  Performing Provider: Fer Rangel MD    Anesthesiologist was present at the time of the procedure.    Preanesthetic Checklist  Completed: patient identified, site marked, surgical consent, pre-op evaluation, timeout performed, IV checked, risks and benefits discussed, monitors and equipment checked and anesthesia consent givenArterial  Skin Prep: chlorhexidine gluconate  Orientation: left  Location: brachial  Catheter Size: 20 G  Catheter placement by Ultrasound guidance. Heme positive aspiration all ports.  Vessel Caliber: large, patent, compressibility normal  Needle advanced into vessel with real time Ultrasound guidance.  Sterile sheath used.Insertion Attempts: 1  Assessment  Dressing: sutured in place and taped and tegaderm  Patient: Tolerated well (pt asleep)  Additional Notes  Right radial artery extremely small, so brachial site chosen.

## 2019-08-20 NOTE — INTERVAL H&P NOTE
The patient has been examined and the H&P has been reviewed:    I concur with the findings and no changes have occurred since H&P was written.    Anesthesia/Surgery risks, benefits and alternative options discussed and understood by patient/family.          Active Hospital Problems    Diagnosis  POA    Peripheral vascular disease, unspecified [I73.9]  Yes      Resolved Hospital Problems   No resolved problems to display.

## 2019-08-20 NOTE — PLAN OF CARE
08/20/19 1342   Patient Assessment/Suction   Level of Consciousness (AVPU) alert   Respiratory Effort Normal;Unlabored   Expansion/Accessory Muscles/Retractions no use of accessory muscles;no retractions   All Lung Fields Breath Sounds clear   Rhythm/Pattern, Respiratory unlabored;pattern regular;depth regular   PRE-TX-O2   O2 Device (Oxygen Therapy) room air   SpO2 99 %   Pulse Oximetry Type Continuous   $ Pulse Oximetry - Multiple Charge Pulse Oximetry - Multiple   Oximetry Probe Site Intact   Pulse 64   Resp 11   Aerosol Therapy   $ Aerosol Therapy Charges Aerosol Treatment   Daily Review of Necessity (SVN) completed   Respiratory Treatment Status (SVN) given   Treatment Route (SVN) mask;oxygen   Patient Position (SVN) semi-Whitley's   Post Treatment Assessment (SVN) breath sounds unchanged   Signs of Intolerance (SVN) none   Breath Sounds Post-Respiratory Treatment   Post-treatment Heart Rate (beats/min) 70   Post-treatment Resp Rate (breaths/min) 16

## 2019-08-20 NOTE — TRANSFER OF CARE
Anesthesia Transfer of Care Note    Patient: Radha Jurado    Procedure(s) Performed: Procedure(s) (LRB):  CREATION, BYPASS, ARTERIAL, AXILLARY TO FEMORAL, USING GRAFT (Right)    Patient location: ICU    Anesthesia Type: general    Transport from OR: Transported from OR on 100% O2 by closed face mask with adequate spontaneous ventilation. Continuous ECG monitoring in transport. Continuous SpO2 monitoring in transport. Continuos invasive BP monitoring in transport    Post pain: adequate analgesia    Post assessment: no apparent anesthetic complications    Post vital signs: stable    Nausea/Vomiting: no nausea/vomiting    Complications: none    Transfer of care protocol was followed      Last vitals:   Visit Vitals  BP (!) 149/65   Pulse (!) 57   Temp 36.8 °C (98.3 °F) (Oral)   Resp 16   LMP  (LMP Unknown)   SpO2 97%   Breastfeeding? No

## 2019-08-20 NOTE — PLAN OF CARE
Pt educated on procedure, fall prevention, pain management.  Pt encouraged to verbalize any questions or concerns.

## 2019-08-21 LAB
ANION GAP SERPL CALC-SCNC: 10 MMOL/L (ref 8–16)
BASOPHILS # BLD AUTO: 0.03 K/UL (ref 0–0.2)
BASOPHILS NFR BLD: 0.2 % (ref 0–1.9)
BUN SERPL-MCNC: 45 MG/DL (ref 8–23)
CALCIUM SERPL-MCNC: 8 MG/DL (ref 8.7–10.5)
CHLORIDE SERPL-SCNC: 104 MMOL/L (ref 95–110)
CO2 SERPL-SCNC: 20 MMOL/L (ref 23–29)
CREAT SERPL-MCNC: 4.8 MG/DL (ref 0.5–1.4)
DIFFERENTIAL METHOD: ABNORMAL
EOSINOPHIL # BLD AUTO: 0 K/UL (ref 0–0.5)
EOSINOPHIL NFR BLD: 0 % (ref 0–8)
ERYTHROCYTE [DISTWIDTH] IN BLOOD BY AUTOMATED COUNT: 17.4 % (ref 11.5–14.5)
EST. GFR  (AFRICAN AMERICAN): 9.9 ML/MIN/1.73 M^2
EST. GFR  (NON AFRICAN AMERICAN): 8.6 ML/MIN/1.73 M^2
GLUCOSE SERPL-MCNC: 96 MG/DL (ref 70–110)
HCT VFR BLD AUTO: 27 % (ref 37–48.5)
HGB BLD-MCNC: 8.8 G/DL (ref 12–16)
IMM GRANULOCYTES # BLD AUTO: 0.14 K/UL (ref 0–0.04)
IMM GRANULOCYTES NFR BLD AUTO: 0.9 % (ref 0–0.5)
LYMPHOCYTES # BLD AUTO: 1.8 K/UL (ref 1–4.8)
LYMPHOCYTES NFR BLD: 11.4 % (ref 18–48)
MCH RBC QN AUTO: 32.4 PG (ref 27–31)
MCHC RBC AUTO-ENTMCNC: 32.6 G/DL (ref 32–36)
MCV RBC AUTO: 99 FL (ref 82–98)
MONOCYTES # BLD AUTO: 1.4 K/UL (ref 0.3–1)
MONOCYTES NFR BLD: 8.8 % (ref 4–15)
NEUTROPHILS # BLD AUTO: 12.7 K/UL (ref 1.8–7.7)
NEUTROPHILS NFR BLD: 78.7 % (ref 38–73)
NRBC BLD-RTO: 0 /100 WBC
PLATELET # BLD AUTO: 220 K/UL (ref 150–350)
PMV BLD AUTO: 10.1 FL (ref 9.2–12.9)
POTASSIUM SERPL-SCNC: 4.2 MMOL/L (ref 3.5–5.1)
RBC # BLD AUTO: 2.72 M/UL (ref 4–5.4)
SODIUM SERPL-SCNC: 134 MMOL/L (ref 136–145)
WBC # BLD AUTO: 16.14 K/UL (ref 3.9–12.7)

## 2019-08-21 PROCEDURE — 90935 HEMODIALYSIS ONE EVALUATION: CPT

## 2019-08-21 PROCEDURE — 25000242 PHARM REV CODE 250 ALT 637 W/ HCPCS: Performed by: THORACIC SURGERY (CARDIOTHORACIC VASCULAR SURGERY)

## 2019-08-21 PROCEDURE — P9047 ALBUMIN (HUMAN), 25%, 50ML: HCPCS | Mod: JG | Performed by: INTERNAL MEDICINE

## 2019-08-21 PROCEDURE — 80048 BASIC METABOLIC PNL TOTAL CA: CPT

## 2019-08-21 PROCEDURE — 63600175 PHARM REV CODE 636 W HCPCS: Mod: JG | Performed by: INTERNAL MEDICINE

## 2019-08-21 PROCEDURE — 94640 AIRWAY INHALATION TREATMENT: CPT

## 2019-08-21 PROCEDURE — 63600175 PHARM REV CODE 636 W HCPCS: Performed by: THORACIC SURGERY (CARDIOTHORACIC VASCULAR SURGERY)

## 2019-08-21 PROCEDURE — 85025 COMPLETE CBC W/AUTO DIFF WBC: CPT

## 2019-08-21 PROCEDURE — 94761 N-INVAS EAR/PLS OXIMETRY MLT: CPT

## 2019-08-21 PROCEDURE — 25000003 PHARM REV CODE 250: Performed by: THORACIC SURGERY (CARDIOTHORACIC VASCULAR SURGERY)

## 2019-08-21 PROCEDURE — 25000003 PHARM REV CODE 250

## 2019-08-21 PROCEDURE — 63600175 PHARM REV CODE 636 W HCPCS

## 2019-08-21 PROCEDURE — 20000000 HC ICU ROOM

## 2019-08-21 RX ORDER — ONDANSETRON 2 MG/ML
4 INJECTION INTRAMUSCULAR; INTRAVENOUS EVERY 6 HOURS PRN
Status: DISCONTINUED | OUTPATIENT
Start: 2019-08-21 | End: 2019-08-23 | Stop reason: HOSPADM

## 2019-08-21 RX ORDER — ONDANSETRON 2 MG/ML
INJECTION INTRAMUSCULAR; INTRAVENOUS
Status: COMPLETED
Start: 2019-08-21 | End: 2019-08-21

## 2019-08-21 RX ORDER — ALBUMIN HUMAN 250 G/1000ML
25 SOLUTION INTRAVENOUS
Status: COMPLETED | OUTPATIENT
Start: 2019-08-21 | End: 2019-08-21

## 2019-08-21 RX ORDER — ALBUMIN HUMAN 250 G/1000ML
25 SOLUTION INTRAVENOUS ONCE
Status: DISCONTINUED | OUTPATIENT
Start: 2019-08-21 | End: 2019-08-21

## 2019-08-21 RX ADMIN — LEVALBUTEROL HYDROCHLORIDE 0.63 MG: 0.63 SOLUTION RESPIRATORY (INHALATION) at 07:08

## 2019-08-21 RX ADMIN — ASPIRIN 81 MG: 81 TABLET, COATED ORAL at 08:08

## 2019-08-21 RX ADMIN — METOPROLOL SUCCINATE 50 MG: 50 TABLET, FILM COATED, EXTENDED RELEASE ORAL at 08:08

## 2019-08-21 RX ADMIN — HYDRALAZINE HYDROCHLORIDE 50 MG: 25 TABLET, FILM COATED ORAL at 08:08

## 2019-08-21 RX ADMIN — HYDROMORPHONE HYDROCHLORIDE 1 MG: 1 INJECTION, SOLUTION INTRAMUSCULAR; INTRAVENOUS; SUBCUTANEOUS at 08:08

## 2019-08-21 RX ADMIN — ONDANSETRON 4 MG: 2 INJECTION INTRAMUSCULAR; INTRAVENOUS at 03:08

## 2019-08-21 RX ADMIN — HYDROMORPHONE HYDROCHLORIDE 1 MG: 1 INJECTION, SOLUTION INTRAMUSCULAR; INTRAVENOUS; SUBCUTANEOUS at 01:08

## 2019-08-21 RX ADMIN — OXYCODONE HYDROCHLORIDE 5 MG: 5 TABLET ORAL at 12:08

## 2019-08-21 RX ADMIN — LEVALBUTEROL HYDROCHLORIDE 0.63 MG: 0.63 SOLUTION RESPIRATORY (INHALATION) at 01:08

## 2019-08-21 RX ADMIN — PANTOPRAZOLE SODIUM 20 MG: 20 TABLET, DELAYED RELEASE ORAL at 06:08

## 2019-08-21 RX ADMIN — CHLORHEXIDINE GLUCONATE 15 ML: 1.2 RINSE ORAL at 08:08

## 2019-08-21 RX ADMIN — PAROXETINE HYDROCHLORIDE 20 MG: 20 TABLET, FILM COATED ORAL at 06:08

## 2019-08-21 RX ADMIN — OXYCODONE HYDROCHLORIDE 5 MG: 5 TABLET ORAL at 06:08

## 2019-08-21 RX ADMIN — ATORVASTATIN CALCIUM 20 MG: 20 TABLET, FILM COATED ORAL at 08:08

## 2019-08-21 RX ADMIN — MUPIROCIN: 20 OINTMENT TOPICAL at 08:08

## 2019-08-21 RX ADMIN — OXYCODONE HYDROCHLORIDE 5 MG: 5 TABLET ORAL at 01:08

## 2019-08-21 RX ADMIN — ALBUMIN (HUMAN) 25 G: 12.5 SOLUTION INTRAVENOUS at 03:08

## 2019-08-21 RX ADMIN — CLOPIDOGREL BISULFATE 75 MG: 75 TABLET, FILM COATED ORAL at 08:08

## 2019-08-21 RX ADMIN — OXYCODONE HYDROCHLORIDE 5 MG: 5 TABLET ORAL at 08:08

## 2019-08-21 RX ADMIN — SODIUM CHLORIDE: 0.9 INJECTION, SOLUTION INTRAVENOUS at 12:08

## 2019-08-21 NOTE — CONSULTS
"[facility]  Nephrology  Consult Note    Patient Name: Radha Jurado  MRN: [id number]  Admission Date: 8/20/2019  Hospital Length of Stay: 1 days  Attending Provider: Joseph Bello MD   Primary Care Physician: Jeovany Carpio MD  Principal Problem:Peripheral vascular disease, unspecified    Inpatient consult to Nephrology  Consult performed by: Aleks Raya MD  Consult ordered by: Joseph Bello MD      : hx ESRD (HD-MWF via R IJ TDC) w/ Hypokalemia  Subjective:     HPI:   68 y/o F w/ "multiple medical problems" including ESRD (HD-MWF via R IJ TDC; s/p L B-C AV Fistula w/ delayed maturation requiring fistulogram w/  on 8/8/19 @ [facility]), HTN, CAD (hx AMI; s/p stent placement), HFpEF (severe Diastolic dysfunction; LVEF: 55-60% by TTE on 8/24/18), HLD, PUD, PVD, CVD (s/p Carotid Artery Occlusion), Tobacco Use d/o, Migraine Headache & Depression electively admitted to Vascular Surgery service on 8/19/19 to undergo R Axillo-Femoral Bypass Surgery (PoD #1); screening labs are notable for hypokalemia (now resolved; 2.9--->4.2); formal Nephrology service consultation is requested to evaluate the aforementioned abnormalities & render diagnostic/therapeutic recommendations.    Past Medical History:   Diagnosis Date    Arthritis     Blood transfusion     Cardiac angina     SL NTG 2-3x wk    Carotid artery occlusion     CHF (congestive heart failure)     COPD exacerbation     Coronary artery disease     w/chronic angina    Depression     Gastric peptic ulcer     HTN (hypertension)     Hyperlipidemia     Migraine headache     PAD (peripheral artery disease)     Renal insufficiency 10/23/12    last creatinine 2.1       Past Surgical History:   Procedure Laterality Date    APPENDECTOMY  unknown    BLADDER SUSPENSION N/A 2005    with Mesh    CARDIAC CATHETERIZATION  2009    MI w/PCI @ [facility]    CHOLECYSTECTOMY N/A 2017    FISTULOGRAM Left 8/8/2019    Performed by " Calixto Javed [facility] C[facility]    HYSTERECTOMY      NEPHRECTOMY Right 1977    under developed kidney removed.    OOPHORECTOMY         Review of patient's allergies indicates:   Allergen Reactions    Dulcolax [bisacodyl] Other (See Comments)     Sweating a lot, weakness    Docusate sodium      Other reaction(s): Makes sweat profusely; Weak    Dulcagen      Current Facility-Administered Medications   Medication Frequency    0.9%  NaCl infusion Continuous    amLODIPine tablet 10 mg QHS    aspirin EC tablet 81 mg Daily    atorvastatin tablet 20 mg Daily    chlorhexidine 0.12 % solution 15 mL BID    clevidipine (CLEVIPREX) infusion 0.5 mg/mL Continuous    clopidogrel tablet 75 mg Daily    hydrALAZINE tablet 50 mg TID    HYDROmorphone injection 1 mg Q4H PRN    levalbuterol nebulizer solution 0.63 mg TID WAKE    metoprolol succinate (TOPROL-XL) 24 hr tablet 50 mg Daily    mupirocin 2 % ointment BID    oxyCODONE immediate release tablet 5 mg Q4H PRN    pantoprazole EC tablet 20 mg Before breakfast    paroxetine tablet 20 mg QAM    sodium chloride 0.9% flush 10 mL PRN     Family History     Problem Relation (Age of Onset)    Heart attack Father    Heart disease Father    Heart failure Father    Hypertension Father        Tobacco Use    Smoking status: Current Every Day Smoker     Packs/day: 0.50     Years: 50.00     Pack years: 25.00     Start date: 1962    Smokeless tobacco: Never Used    Tobacco comment: Do not smoke day of surgery   Substance and Sexual Activity    Alcohol use: Yes     Comment: holidays    Drug use: No    Sexual activity: Not on file     Review of Systems   Constitutional: Positive for activity change and fatigue.   HENT: Negative.    Eyes: Negative.    Respiratory: Positive for cough, chest tightness, shortness of breath and wheezing.    Cardiovascular: Negative.    Gastrointestinal: Negative.    Endocrine: Negative.    Genitourinary: Negative.    Musculoskeletal: Positive  for gait problem.        B LE claudication   Skin: Negative.    Allergic/Immunologic: Negative.    Neurological: Positive for weakness, numbness and headaches.   Hematological: Negative.    Psychiatric/Behavioral: Positive for dysphoric mood.     Objective:     Vital Signs (Most Recent):  Temp: 98.6 °F (37 °C) (08/21/19 0000)  Pulse: 63 (08/21/19 0500)  Resp: 15 (08/21/19 0500)  BP: (!) 101/46 (08/21/19 0500)  SpO2: (!) 92 % (08/21/19 0500)  O2 Device (Oxygen Therapy): room air (08/21/19 0500) Vital Signs (24h Range):  Temp:  [97.9 °F (36.6 °C)-98.7 °F (37.1 °C)] 98.6 °F (37 °C)  Pulse:  [62-81] 63  Resp:  [11-21] 15  SpO2:  [92 %-100 %] 92 %  BP: ()/(44-59) 101/46  Arterial Line BP: (111-159)/(40-57) 157/48     Weight: 52.5 kg (115 lb 11.9 oz) (08/20/19 1800)  Body mass index is 21.87 kg/m².  Body surface area is 1.5 meters squared.    I/O last 3 completed shifts:  In: 1418.8 [I.V.:1168.8; Blood:250]  Out: 695 [Urine:95; Blood:600]    Physical Exam   Constitutional: She is oriented to person, place, and time. She appears well-developed and well-nourished.   HENT:   Head: Normocephalic and atraumatic.   Mouth/Throat: Oropharynx is clear and moist.   Eyes: Conjunctivae are normal. Right eye exhibits no discharge. Left eye exhibits no discharge. No scleral icterus.   Neck: Normal range of motion. Neck supple. No JVD present.   Cardiovascular: Normal rate, regular rhythm, normal heart sounds and intact distal pulses. Exam reveals no gallop and no friction rub.   No murmur heard.  Pulmonary/Chest: Effort normal and breath sounds normal. No respiratory distress. She has no wheezes. She has no rales.   Abdominal: Soft. She exhibits no distension and no mass. There is no tenderness.   Hypoactive BS in all quadrants   Genitourinary:   Genitourinary Comments: Deferred   Musculoskeletal: Normal range of motion. She exhibits no edema, tenderness or deformity.   L UE (B-C) Fistula w/ poor thrill noted   Neurological: She  is alert and oriented to person, place, and time. No cranial nerve deficit.   Skin: Skin is warm and dry. No rash noted. She is not diaphoretic. No erythema. No pallor.   R IJ TDC w/ clean exit-site noted   Psychiatric: She has a normal mood and affect. Her behavior is normal.   Nursing note and vitals reviewed.      Significant Labs:  BMP:   Recent Labs   Lab 08/21/19  0400   GLU 96      CO2 20*   BUN 45*   CREATININE 4.8*   CALCIUM 8.0*     Cardiac Markers: No results for input(s): CKMB, TROPONINT, MYOGLOBIN in the last 168 hours.  CBC:   Recent Labs   Lab 08/21/19  0400   WBC 16.14*   RBC 2.72*   HGB 8.8*   HCT 27.0*      MCV 99*   MCH 32.4*   MCHC 32.6     CMP:   Recent Labs   Lab 08/21/19  0400   GLU 96   CALCIUM 8.0*   *   K 4.2   CO2 20*      BUN 45*   CREATININE 4.8*     LFTs: No results for input(s): ALT, AST, ALKPHOS, BILITOT, PROT, ALBUMIN in the last 168 hours.  No results for input(s): COLORU, CLARITYU, SPECGRAV, PHUR, PROTEINUA, GLUCOSEU, BILIRUBINCON, BLOODU, WBCU, RBCU, BACTERIA, MUCUS, NITRITE, LEUKOCYTESUR, UROBILINOGEN, HYALINECASTS in the last 168 hours.    Significant Imaging:  X-Ray: Reviewed    Assessment/Plan:     Active Diagnoses:    Diagnosis Date Noted POA    PRINCIPAL PROBLEM:  Peripheral vascular disease, unspecified [I73.9] 08/20/2019 Yes    Hypokalemia [E87.6] 08/20/2019 Yes      Problems Resolved During this Admission:     1. HYPOKALEMIA (2.9--->4.2 w/ IV repletion)- clinically stable at present; etiology likely multifactorial (Albuterol, poor oral intake); no active issues (HYPOMAGNESEMIA also a potential cause)    -3K bath during HD & check DAILY renal function panel & serum Mg levels to assess response to therapy    2. ESRD (HD-MWF via R IJ TDC)- clinically stable at present; no overt volume overload, significant electrolyte perturbations nor acid-base disturbance; no active issues    -maintenance HD (duration: 3h; UF Goal: 1-2L as tolerated; Standard  'bath'; Access: TDC; Qb: 300 ml/min, Qd: 2x BFR; 12.5-25 gm 25% Albumin w/ HD for BP support)    -DAILY renal function panel to document sCr trend, optimize HD electrolyte 'bath' & assess response to therapy    -avoid nephrotoxic agents (NSAIDs, IV contrast dye)    -renally dose all appropriate medications, including antibiotics    3. HTN- clinically stable at present now w/ relative hypotension (SBP: < 110); no active issues    -hold anti-HTN medications for now & reassess later    4. ANEMIA- clinically stable at present; H/H MARGINALLY AT GOAL (8.8/27.0); no active issues    -trend daily CBC (H/H) to effect optimal blood-loss surveillance    5. SECONDARY HYPERPARATHYROIDISM (sHPT)- clinically stable at present on maintenance therapy; no active issues    -continue dietary binder/Vitamin D +/- HD-associated calcimimetic therapy (Cinacalcet vs Etelcalcetide)    Thank you for your consult. I will follow-up with patient. Please contact us if you have any additional questions.    Aleks Raya III, MD  [facility]  [facility]  [phone] (C)

## 2019-08-21 NOTE — PROGRESS NOTES
"[facility]  Adult Nutrition  Progress Note  (Initial Assessment)    SUMMARY       Recommendations    Recommendation/Intervention: 1. Added ensure enlive TID (to provide 1050 kcal/day and 60 g/day protein). 2. Recommend resuming appetite stimulant, per medication history patient was taking cyproheptadine (periactin) 4mg PO BID prior to admit. 3. Recommend obtaining phosphorus lab, last phosphorus 5.3 (H) on 8/25/18 to better assess appropriateness of current diet and supplements.   Goals: 1. Patient to meet at least 75% of estimated needs PO via meals and supplements. 2. Appetite stimulant to be started if appropriate. 3. Phosphorus lab to be obtained and reviewed, appropriate diet/supplement changes to be made if appropriate.   Nutrition Goal Status: new    Reason for Assessment    Reason For Assessment: other (see comments)(ICU status, s/p surgery > 68y/o )  Diagnosis: cardiac disease, pulmonary disease, renal disease  Relevant Medical History: ESRD on HD, COPD, CHF, CAD   Interdisciplinary Rounds: did not attend  General Information Comments: s/p femoral bypass POD#1     Nutrition Risk Screen    Nutrition Risk Screen: other (recent surgery, >71 y/o, decreased appetite PTA, BMI <20)    Nutrition/Diet History    Patient Reported Diet/Restrictions/Preferences: general  Typical Food/Fluid Intake: decreased appetite and decreased intake PTA   Spiritual, Cultural Beliefs, Restorationism Practices, Values that Affect Care: no  Supplemental Drinks or Food Habits: Ensure, sometimes at home   Vitamin/Mineral/Herbal Supplements/Medications: aspirin, metoprolol, protonix, Ca carbonate, periactin   Food Allergies: NKFA  Factors Affecting Nutritional Intake: decreased appetite    Anthropometrics    Temp: 98.3 °F (36.8 °C)  Height Method: Stated  Height: 5' 1" (154.9 cm)  Height (inches): 61 in  Weight Method: Standard Scale  Weight: 47.7 kg (105 lb 2.6 oz)(8/19/19)  Weight (lb): 105.16 lb  Ideal Body Weight (IBW), " Female: 105 lb  % Ideal Body Weight, Female (lb): 100.15 lb  BMI (Calculated): 19.9  BMI Grade: 18.5-24.9 - normal     Lab/Procedures/Meds    Pertinent Labs Reviewed: reviewed  BMP  Lab Results   Component Value Date     (L) 08/21/2019    K 4.2 08/21/2019     08/21/2019    CO2 20 (L) 08/21/2019    BUN 45 (H) 08/21/2019    CREATININE 4.8 (H) 08/21/2019    CALCIUM 8.0 (L) 08/21/2019    ANIONGAP 10 08/21/2019    ESTGFRAFRICA 9.9 (A) 08/21/2019    EGFRNONAA 8.6 (A) 08/21/2019     Lab Results   Component Value Date    WBC 16.14 (H) 08/21/2019    HGB 8.8 (L) 08/21/2019    HCT 27.0 (L) 08/21/2019    MCV 99 (H) 08/21/2019     08/21/2019     Lab Results   Component Value Date    CALCIUM 8.0 (L) 08/21/2019    PHOS 5.3 (H) 08/25/2018     Lab Results   Component Value Date    HGBA1C 4.9 08/25/2018     Pertinent Medications Reviewed: reviewed  Scheduled Meds:   albumin human 25%  25 g Intravenous Once in dialysis    amLODIPine  10 mg Oral QHS    aspirin  81 mg Oral Daily    atorvastatin  20 mg Oral Daily    chlorhexidine  15 mL Mouth/Throat BID    clopidogrel  75 mg Oral Daily    hydrALAZINE  50 mg Oral TID    levalbuterol  0.63 mg Nebulization TID WAKE    metoprolol succinate  50 mg Oral Daily    mupirocin   Nasal BID    pantoprazole  20 mg Oral Before breakfast    paroxetine  20 mg Oral QAM     Infusions:   sodium chloride 0.9% 75 mL/hr at 08/21/19 0000    clevidipine       Estimated/Assessed Needs    Weight Used For Calorie Calculations: 47.7 kg (105 lb 2.6 oz)  Energy Calorie Requirements (kcal): 1431 - 1670 (30 - 35 kcal/kg)   Energy Need Method: Kcal/kg  Protein Requirements: 57 - 72 (1.2 - 1.5 g/kg)   Weight Used For Protein Calculations: 47.7 kg (105 lb 2.6 oz)  Fluid Requirements (mL): UOP + 1000 ml/day or per MD   Estimated Fluid Requirement Method: other (see comments)(hemodialysis )  RDA Method (mL): 1431       Nutrition Prescription Ordered    Current Diet Order: Regular      Evaluation of Received Nutrient/Fluid Intake    I/O: 2.2/ 1.5   Energy Calories Required: not meeting needs  Protein Required: not meeting needs  Fluid Required: not meeting needs  Comments: Patient denies any N/V/D, no chewing or swallowing difficulties. Patient states she is just not hungry and does not feel like really eating at lunch time. RD offered supplement, pt accepted. RD encouraged PO intake of meals and supplements. Patient denies any recent weight loss, UBW was 89 lbs (40.5 kg) in December 2018 per patient..  Tolerance: tolerating  % Intake of Estimated Energy Needs: 0 - 25 %  % Meal Intake: 0 - 25 %    Nutrition Risk    Level of Risk/Frequency of Follow-up: high     Monitor and Evaluation    Food and Nutrient Intake: energy intake, food and beverage intake  Food and Nutrient Adminstration: diet order  Physical Activity and Function: nutrition-related ADLs and IADLs  Anthropometric Measurements: weight, weight change, body mass index  Biochemical Data, Medical Tests and Procedures: electrolyte and renal panel, lipid profile, gastrointestinal profile, glucose/endocrine profile, inflammatory profile  Nutrition-Focused Physical Findings: overall appearance     Nutrition Follow-Up    RD Follow-up?: Yes    Kaley Martínez 08/21/2019 2:01 PM

## 2019-08-21 NOTE — PROGRESS NOTES
This patient is status post axillo to femoral bypass with femoral femoral bypass.  She has no major complaints today except for mild nausea.  She is scheduled to undergo dialysis this afternoon.  On exam vital signs are stable.  Her surgical wounds are dressed.  Perfusion to legs and feet satisfactory.  At this point she is doing well status post surgery yesterday.  Her Crouch catheter can be removed.  Mobilization can begin.

## 2019-08-21 NOTE — NURSING
HD complete, VSS  Net UF 1400 mL  Pt complained a couple of times of feeling nauseated and vomited once, goal decreased as a result  Albumin administered during tx per MD order  Report given to HEVER Galeana

## 2019-08-21 NOTE — PLAN OF CARE
08/21/19 0702   Patient Assessment/Suction   Level of Consciousness (AVPU) alert   Respiratory Effort Normal;Unlabored   All Lung Fields Breath Sounds clear;equal bilaterally   Cough Type no productive sputum   PRE-TX-O2   O2 Device (Oxygen Therapy) room air   SpO2 (!) 93 %   Pulse Oximetry Type Continuous   $ Pulse Oximetry - Multiple Charge Pulse Oximetry - Multiple   Pulse 68   Resp 20   Aerosol Therapy   $ Aerosol Therapy Charges Aerosol Treatment   Respiratory Treatment Status (SVN) given   Treatment Route (SVN) mask   Patient Position (SVN) semi-Whitley's   Post Treatment Assessment (SVN) breath sounds improved   Signs of Intolerance (SVN) none   Breath Sounds Post-Respiratory Treatment   Throughout All Fields Post-Treatment All Fields   Throughout All Fields Post-Treatment aeration increased   Post-treatment Heart Rate (beats/min) 67   Post-treatment Resp Rate (breaths/min) 18

## 2019-08-21 NOTE — PLAN OF CARE
Problem: Adult Inpatient Plan of Care  Goal: Plan of Care Review  Ongoing education provided during shift regarding POC and medications. Questions encouraged.  Safety maintained.  Pain controlled with PRN meds.Spouse at bedside. Tolerated HD today. Incision sites remain free of swelling. Dressings CDI. Crouch cath removed per orders.Pt refused to get OOB this PM. Stated to weak from HD.

## 2019-08-22 LAB
ALBUMIN SERPL BCP-MCNC: 3 G/DL (ref 3.5–5.2)
ANION GAP SERPL CALC-SCNC: 12 MMOL/L (ref 8–16)
BUN SERPL-MCNC: 32 MG/DL (ref 8–23)
CALCIUM SERPL-MCNC: 8.1 MG/DL (ref 8.7–10.5)
CHLORIDE SERPL-SCNC: 95 MMOL/L (ref 95–110)
CO2 SERPL-SCNC: 27 MMOL/L (ref 23–29)
CREAT SERPL-MCNC: 3.7 MG/DL (ref 0.5–1.4)
ERYTHROCYTE [DISTWIDTH] IN BLOOD BY AUTOMATED COUNT: 16.6 % (ref 11.5–14.5)
EST. GFR  (AFRICAN AMERICAN): 13.6 ML/MIN/1.73 M^2
EST. GFR  (NON AFRICAN AMERICAN): 11.8 ML/MIN/1.73 M^2
GLUCOSE SERPL-MCNC: 85 MG/DL (ref 70–110)
HCT VFR BLD AUTO: 22.8 % (ref 37–48.5)
HGB BLD-MCNC: 7.5 G/DL (ref 12–16)
MCH RBC QN AUTO: 32.2 PG (ref 27–31)
MCHC RBC AUTO-ENTMCNC: 32.9 G/DL (ref 32–36)
MCV RBC AUTO: 98 FL (ref 82–98)
PHOSPHATE SERPL-MCNC: 3.2 MG/DL (ref 2.7–4.5)
PLATELET # BLD AUTO: 209 K/UL (ref 150–350)
PMV BLD AUTO: 10.1 FL (ref 9.2–12.9)
POTASSIUM SERPL-SCNC: 3.6 MMOL/L (ref 3.5–5.1)
RBC # BLD AUTO: 2.33 M/UL (ref 4–5.4)
SODIUM SERPL-SCNC: 134 MMOL/L (ref 136–145)
WBC # BLD AUTO: 15.85 K/UL (ref 3.9–12.7)

## 2019-08-22 PROCEDURE — 85027 COMPLETE CBC AUTOMATED: CPT

## 2019-08-22 PROCEDURE — 20000000 HC ICU ROOM

## 2019-08-22 PROCEDURE — 25000003 PHARM REV CODE 250: Performed by: THORACIC SURGERY (CARDIOTHORACIC VASCULAR SURGERY)

## 2019-08-22 PROCEDURE — 80069 RENAL FUNCTION PANEL: CPT

## 2019-08-22 PROCEDURE — 36415 COLL VENOUS BLD VENIPUNCTURE: CPT

## 2019-08-22 PROCEDURE — 25000003 PHARM REV CODE 250

## 2019-08-22 PROCEDURE — 94761 N-INVAS EAR/PLS OXIMETRY MLT: CPT

## 2019-08-22 PROCEDURE — 94640 AIRWAY INHALATION TREATMENT: CPT

## 2019-08-22 PROCEDURE — 25000242 PHARM REV CODE 250 ALT 637 W/ HCPCS: Performed by: THORACIC SURGERY (CARDIOTHORACIC VASCULAR SURGERY)

## 2019-08-22 PROCEDURE — 25000242 PHARM REV CODE 250 ALT 637 W/ HCPCS: Performed by: INTERNAL MEDICINE

## 2019-08-22 RX ORDER — SODIUM CHLORIDE 9 MG/ML
INJECTION, SOLUTION INTRAVENOUS ONCE
Status: DISCONTINUED | OUTPATIENT
Start: 2019-08-22 | End: 2019-08-23

## 2019-08-22 RX ORDER — LEVALBUTEROL INHALATION SOLUTION 0.63 MG/3ML
0.63 SOLUTION RESPIRATORY (INHALATION) 2 TIMES DAILY
Status: DISCONTINUED | OUTPATIENT
Start: 2019-08-22 | End: 2019-08-23 | Stop reason: HOSPADM

## 2019-08-22 RX ORDER — SODIUM CHLORIDE 9 MG/ML
INJECTION, SOLUTION INTRAVENOUS
Status: DISCONTINUED | OUTPATIENT
Start: 2019-08-22 | End: 2019-08-23 | Stop reason: HOSPADM

## 2019-08-22 RX ADMIN — LEVALBUTEROL HYDROCHLORIDE 0.63 MG: 0.63 SOLUTION RESPIRATORY (INHALATION) at 08:08

## 2019-08-22 RX ADMIN — CLOPIDOGREL BISULFATE 75 MG: 75 TABLET, FILM COATED ORAL at 08:08

## 2019-08-22 RX ADMIN — CHLORHEXIDINE GLUCONATE 15 ML: 1.2 RINSE ORAL at 08:08

## 2019-08-22 RX ADMIN — PANTOPRAZOLE SODIUM 20 MG: 20 TABLET, DELAYED RELEASE ORAL at 05:08

## 2019-08-22 RX ADMIN — OXYCODONE HYDROCHLORIDE 5 MG: 5 TABLET ORAL at 09:08

## 2019-08-22 RX ADMIN — OXYCODONE HYDROCHLORIDE 5 MG: 5 TABLET ORAL at 03:08

## 2019-08-22 RX ADMIN — HYDRALAZINE HYDROCHLORIDE 50 MG: 25 TABLET, FILM COATED ORAL at 10:08

## 2019-08-22 RX ADMIN — ATORVASTATIN CALCIUM 20 MG: 20 TABLET, FILM COATED ORAL at 08:08

## 2019-08-22 RX ADMIN — METOPROLOL SUCCINATE 50 MG: 50 TABLET, FILM COATED, EXTENDED RELEASE ORAL at 10:08

## 2019-08-22 RX ADMIN — OXYCODONE HYDROCHLORIDE 5 MG: 5 TABLET ORAL at 08:08

## 2019-08-22 RX ADMIN — OXYCODONE HYDROCHLORIDE 5 MG: 5 TABLET ORAL at 12:08

## 2019-08-22 RX ADMIN — ASPIRIN 81 MG: 81 TABLET, COATED ORAL at 08:08

## 2019-08-22 RX ADMIN — MUPIROCIN: 20 OINTMENT TOPICAL at 08:08

## 2019-08-22 RX ADMIN — HYDRALAZINE HYDROCHLORIDE 50 MG: 25 TABLET, FILM COATED ORAL at 09:08

## 2019-08-22 RX ADMIN — CHLORHEXIDINE GLUCONATE 15 ML: 1.2 RINSE ORAL at 09:08

## 2019-08-22 RX ADMIN — PAROXETINE HYDROCHLORIDE 20 MG: 20 TABLET, FILM COATED ORAL at 08:08

## 2019-08-22 RX ADMIN — MUPIROCIN: 20 OINTMENT TOPICAL at 09:08

## 2019-08-22 NOTE — CARE UPDATE
08/21/19 1959   Patient Assessment/Suction   Level of Consciousness (AVPU) alert   All Lung Fields Breath Sounds clear   PRE-TX-O2   O2 Device (Oxygen Therapy) room air   SpO2 98 %   Pulse Oximetry Type Continuous   $ Pulse Oximetry - Multiple Charge Pulse Oximetry - Multiple   Pulse 80   Resp 16   Aerosol Therapy   $ Aerosol Therapy Charges Aerosol Treatment   Daily Review of Necessity (SVN) completed   Respiratory Treatment Status (SVN) given   Treatment Route (SVN) mask   Patient Position (SVN) semi-Whitley's   Post Treatment Assessment (SVN) breath sounds improved   Signs of Intolerance (SVN) none   Breath Sounds Post-Respiratory Treatment   Throughout All Fields Post-Treatment All Fields   Throughout All Fields Post-Treatment aeration increased   Post-treatment Heart Rate (beats/min) 78   Post-treatment Resp Rate (breaths/min) 20

## 2019-08-22 NOTE — PLAN OF CARE
Problem: Adult Inpatient Plan of Care  Goal: Plan of Care Review  Outcome: Ongoing (interventions implemented as appropriate)  Ongoing education provided during shift regarding POC and medications. Questions encouraged.  Safety maintained.  Pain controlled with PRN meds. Pt ambulated today. Up in chair x 2. Spouse at bedside.

## 2019-08-22 NOTE — PLAN OF CARE
Problem: Fall Injury Risk  Goal: Absence of Fall and Fall-Related Injury  Outcome: Ongoing (interventions implemented as appropriate)  Safety measures maintained, fall prevention reviewed with pt. Encouraged use of call bell

## 2019-08-22 NOTE — PROGRESS NOTES
This patient is status post axillo femoral and femoral femoral bypass.  She is doing well.  She was dialyzed yesterday.  Her vital signs are stable.  Her surgical wounds are clean and dry.  Perfusion to the legs and feet is satisfactory.  The patient will be mobilized today and ambulate and hopefully be discharged in the next 1-2 days.

## 2019-08-22 NOTE — PROGRESS NOTES
[facility]  Nephrology  Progress Note    Patient Name: Radha Jurado  MRN: [id number]  Admission Date: 8/20/2019  Hospital Length of Stay: 2 days  Attending Provider: Joseph Bello MD   Primary Care Physician: Jeovany Carpio MD  Principal Problem:Peripheral vascular disease, unspecified      Subjective:     Interval History: feels 'ok' this am; denies any specific complaints; s/p HD on 8/21/19 w/ net UF approx 1.4L    Review of patient's allergies indicates:   Allergen Reactions    Dulcolax [bisacodyl] Other (See Comments)     Sweating a lot, weakness    Docusate sodium      Other reaction(s): Makes sweat profusely; Weak    Dulcagen      Current Facility-Administered Medications   Medication Frequency    0.9%  NaCl infusion Continuous    0.9%  NaCl infusion PRN    0.9%  NaCl infusion Once    amLODIPine tablet 10 mg QHS    aspirin EC tablet 81 mg Daily    atorvastatin tablet 20 mg Daily    chlorhexidine 0.12 % solution 15 mL BID    clevidipine (CLEVIPREX) infusion 0.5 mg/mL Continuous    clopidogrel tablet 75 mg Daily    hydrALAZINE tablet 50 mg TID    HYDROmorphone injection 1 mg Q4H PRN    levalbuterol nebulizer solution 0.63 mg TID WAKE    metoprolol succinate (TOPROL-XL) 24 hr tablet 50 mg Daily    mupirocin 2 % ointment BID    ondansetron injection 4 mg Q6H PRN    oxyCODONE immediate release tablet 5 mg Q4H PRN    pantoprazole EC tablet 20 mg Before breakfast    paroxetine tablet 20 mg QAM    sodium chloride 0.9% flush 10 mL PRN       Objective:     Vital Signs (Most Recent):  Temp: 98.2 °F (36.8 °C) (08/22/19 0700)  Pulse: 62 (08/22/19 0630)  Resp: (!) 24 (08/21/19 2300)  BP: (!) 95/50 (08/22/19 0630)  SpO2: (!) 92 % (08/22/19 0630)  O2 Device (Oxygen Therapy): room air (08/22/19 0701) Vital Signs (24h Range):  Temp:  [98.2 °F (36.8 °C)-98.9 °F (37.2 °C)] 98.2 °F (36.8 °C)  Pulse:  [] 62  Resp:  [13-42] 24  SpO2:  [77 %-100 %] 92 %  BP: ()/(44-69) 95/50      Weight: 49.8 kg (109 lb 12.6 oz) (08/22/19 0600)  Body mass index is 20.74 kg/m².  Body surface area is 1.46 meters squared.    I/O last 3 completed shifts:  In: 1745 [P.O.:320; I.V.:825; Other:600]  Out: 2191 [Urine:190; Other:2000; Stool:1]    Physical Exam   Constitutional: She is oriented to person, place, and time. She appears well-developed and well-nourished. No distress.   HENT:   Head: Normocephalic and atraumatic.   Eyes: Conjunctivae are normal. Right eye exhibits no discharge. Left eye exhibits no discharge. No scleral icterus.   Neck: Normal range of motion. Neck supple. No JVD present.   Cardiovascular: Normal rate, regular rhythm, normal heart sounds and intact distal pulses. Exam reveals no gallop and no friction rub.   No murmur heard.  Pulmonary/Chest: Effort normal and breath sounds normal. No respiratory distress. She has no rales.   Abdominal: Soft. She exhibits no distension and no mass. There is no tenderness.   Hypoactive BS in all quadrants   Genitourinary:   Genitourinary Comments: Deferred   Musculoskeletal: Normal range of motion. She exhibits no edema, tenderness or deformity.   L UE AV Fistula w/ palpable thrill   Neurological: She is alert and oriented to person, place, and time. No cranial nerve deficit.   Skin: Skin is warm and dry. No rash noted. She is not diaphoretic. No erythema. No pallor.   R IJ TDC w/ clean exit-site noted   Psychiatric: She has a normal mood and affect. Her behavior is normal.   Nursing note and vitals reviewed.      Significant Labs:sure  Cardiac Markers: No results for input(s): CKMB, TROPONINT, MYOGLOBIN in the last 168 hours.  CBC:   Recent Labs   Lab 08/21/19  0400   WBC 16.14*   RBC 2.72*   HGB 8.8*   HCT 27.0*      MCV 99*   MCH 32.4*   MCHC 32.6     CMP:   Recent Labs   Lab 08/21/19  0400   GLU 96   CALCIUM 8.0*   *   K 4.2   CO2 20*      BUN 45*   CREATININE 4.8*     Microbiology Results (last 7 days)     ** No results found  for the last 168 hours. **        No results for input(s): COLORU, CLARITYU, SPECGRAV, PHUR, PROTEINUA, GLUCOSEU, BILIRUBINCON, BLOODU, WBCU, RBCU, BACTERIA, MUCUS, NITRITE, LEUKOCYTESUR, UROBILINOGEN, HYALINECASTS in the last 168 hours.    Significant Imaging:  X-Ray: Reviewed    Assessment/Plan:     Active Diagnoses:    Diagnosis Date Noted POA    PRINCIPAL PROBLEM:  Peripheral vascular disease, unspecified [I73.9] 08/20/2019 Yes    Hypokalemia [E87.6] 08/20/2019 Yes      Problems Resolved During this Admission:     1. ESRD (HD-MWF via R IJ TDC)- clinically stable at present; no overt volume overload, significant electrolyte perturbations nor acid-base disturbance; no active issues    -maintenance HD (duration: 3h; UF Goal: 1-2L as tolerated; Standard 'bath'; Access: TDC; Qb: 300 ml/min, Qd: 2x BFR; 12.5-25 gm 25% Albumin w/ HD for BP support)    -DAILY renal function panel to document sCr trend, optimize HD electrolyte 'bath' & assess response to therapy    -avoid nephrotoxic agents (NSAIDs, IV contrast dye)    -renally dose all appropriate medications, including antibiotics     2. HTN- clinically stable at present now w/ relative hypotension (SBP: < 100); no active issues    -hold anti-HTN medications for now & reassess later     3. ANEMIA- clinically stable at present; H/H MARGINALLY AT GOAL (8.8/27.0 on 8/21/19); no active issues    -trend daily CBC (H/H) to effect optimal blood-loss surveillance     4. SECONDARY HYPERPARATHYROIDISM (sHPT)- clinically stable at present on maintenance therapy; no active issues    -continue dietary binder/Vitamin D +/- HD-associated calcimimetic therapy (Cinacalcet vs Etelcalcetide)    5. PVD (s/p R Axillo-Femoral Bypass Surgery; PoD #2)- clinically stable at present; no active issues    -management per Primary Service (Vascular Surgery)    Thank you for your consult. I will follow-up with patient. Please contact us if you have any additional questions.    Aleks Raya III,  MD  [facility]  [facility]  [phone] (C)

## 2019-08-22 NOTE — PLAN OF CARE
Problem: Skin Injury Risk Increased  Goal: Skin Health and Integrity  Outcome: Ongoing (interventions implemented as appropriate)  dressings dry and intact. Ice pack to right shoulder for swelling

## 2019-08-22 NOTE — PLAN OF CARE
Problem: Infection  Goal: Infection Symptom Resolution  Outcome: Ongoing (interventions implemented as appropriate)  No sign of infection, no fever

## 2019-08-22 NOTE — PLAN OF CARE
08/22/19 0813   Patient Assessment/Suction   Level of Consciousness (AVPU) alert   All Lung Fields Breath Sounds clear;equal bilaterally   Rhythm/Pattern, Respiratory unlabored   Cough Type nonproductive   PRE-TX-O2   O2 Device (Oxygen Therapy) room air   SpO2 96 %   Pulse Oximetry Type Continuous   $ Pulse Oximetry - Multiple Charge Pulse Oximetry - Multiple   Pulse 62   Resp 18   Aerosol Therapy   $ Aerosol Therapy Charges Aerosol Treatment   Respiratory Treatment Status (SVN) given   Treatment Route (SVN) mask   Patient Position (SVN) semi-Whitley's   Post Treatment Assessment (SVN) breath sounds improved   Signs of Intolerance (SVN) none   Breath Sounds Post-Respiratory Treatment   Throughout All Fields Post-Treatment All Fields   Throughout All Fields Post-Treatment aeration increased   Post-treatment Heart Rate (beats/min) 60   Post-treatment Resp Rate (breaths/min) 19

## 2019-08-23 VITALS
TEMPERATURE: 99 F | OXYGEN SATURATION: 97 % | RESPIRATION RATE: 16 BRPM | HEART RATE: 39 BPM | DIASTOLIC BLOOD PRESSURE: 54 MMHG | SYSTOLIC BLOOD PRESSURE: 127 MMHG | BODY MASS INDEX: 21.52 KG/M2 | HEIGHT: 61 IN | WEIGHT: 114 LBS

## 2019-08-23 LAB
ALBUMIN SERPL BCP-MCNC: 2.8 G/DL (ref 3.5–5.2)
ANION GAP SERPL CALC-SCNC: 10 MMOL/L (ref 8–16)
BASOPHILS # BLD AUTO: 0.02 K/UL (ref 0–0.2)
BASOPHILS NFR BLD: 0.1 % (ref 0–1.9)
BLD PROD TYP BPU: NORMAL
BLOOD UNIT EXPIRATION DATE: NORMAL
BLOOD UNIT TYPE CODE: 5100
BLOOD UNIT TYPE: NORMAL
BUN SERPL-MCNC: 48 MG/DL (ref 8–23)
CALCIUM SERPL-MCNC: 8.3 MG/DL (ref 8.7–10.5)
CHLORIDE SERPL-SCNC: 97 MMOL/L (ref 95–110)
CO2 SERPL-SCNC: 26 MMOL/L (ref 23–29)
CODING SYSTEM: NORMAL
CREAT SERPL-MCNC: 5.1 MG/DL (ref 0.5–1.4)
DIFFERENTIAL METHOD: ABNORMAL
DISPENSE STATUS: NORMAL
EOSINOPHIL # BLD AUTO: 0.2 K/UL (ref 0–0.5)
EOSINOPHIL NFR BLD: 1 % (ref 0–8)
ERYTHROCYTE [DISTWIDTH] IN BLOOD BY AUTOMATED COUNT: 16.2 % (ref 11.5–14.5)
EST. GFR  (AFRICAN AMERICAN): 9.2 ML/MIN/1.73 M^2
EST. GFR  (NON AFRICAN AMERICAN): 8 ML/MIN/1.73 M^2
GLUCOSE SERPL-MCNC: 90 MG/DL (ref 70–110)
HCT VFR BLD AUTO: 22.2 % (ref 37–48.5)
HGB BLD-MCNC: 7.1 G/DL (ref 12–16)
IMM GRANULOCYTES # BLD AUTO: 0.12 K/UL (ref 0–0.04)
IMM GRANULOCYTES NFR BLD AUTO: 0.8 % (ref 0–0.5)
LYMPHOCYTES # BLD AUTO: 1.4 K/UL (ref 1–4.8)
LYMPHOCYTES NFR BLD: 9.7 % (ref 18–48)
MCH RBC QN AUTO: 31.7 PG (ref 27–31)
MCHC RBC AUTO-ENTMCNC: 32 G/DL (ref 32–36)
MCV RBC AUTO: 99 FL (ref 82–98)
MONOCYTES # BLD AUTO: 1.2 K/UL (ref 0.3–1)
MONOCYTES NFR BLD: 8.4 % (ref 4–15)
NEUTROPHILS # BLD AUTO: 11.7 K/UL (ref 1.8–7.7)
NEUTROPHILS NFR BLD: 80 % (ref 38–73)
NRBC BLD-RTO: 0 /100 WBC
NUM UNITS TRANS PACKED RBC: NORMAL
PHOSPHATE SERPL-MCNC: 3.3 MG/DL (ref 2.7–4.5)
PLATELET # BLD AUTO: 205 K/UL (ref 150–350)
PMV BLD AUTO: 10.1 FL (ref 9.2–12.9)
POTASSIUM SERPL-SCNC: 3.5 MMOL/L (ref 3.5–5.1)
RBC # BLD AUTO: 2.24 M/UL (ref 4–5.4)
SODIUM SERPL-SCNC: 133 MMOL/L (ref 136–145)
WBC # BLD AUTO: 14.67 K/UL (ref 3.9–12.7)

## 2019-08-23 PROCEDURE — P9016 RBC LEUKOCYTES REDUCED: HCPCS

## 2019-08-23 PROCEDURE — 36430 TRANSFUSION BLD/BLD COMPNT: CPT

## 2019-08-23 PROCEDURE — 85025 COMPLETE CBC W/AUTO DIFF WBC: CPT

## 2019-08-23 PROCEDURE — 25000003 PHARM REV CODE 250

## 2019-08-23 PROCEDURE — 25000003 PHARM REV CODE 250: Performed by: THORACIC SURGERY (CARDIOTHORACIC VASCULAR SURGERY)

## 2019-08-23 PROCEDURE — 63600175 PHARM REV CODE 636 W HCPCS: Performed by: THORACIC SURGERY (CARDIOTHORACIC VASCULAR SURGERY)

## 2019-08-23 PROCEDURE — 86704 HEP B CORE ANTIBODY TOTAL: CPT

## 2019-08-23 PROCEDURE — 36415 COLL VENOUS BLD VENIPUNCTURE: CPT

## 2019-08-23 PROCEDURE — 86706 HEP B SURFACE ANTIBODY: CPT

## 2019-08-23 PROCEDURE — 87340 HEPATITIS B SURFACE AG IA: CPT

## 2019-08-23 PROCEDURE — 90935 HEMODIALYSIS ONE EVALUATION: CPT

## 2019-08-23 PROCEDURE — 25000242 PHARM REV CODE 250 ALT 637 W/ HCPCS: Performed by: INTERNAL MEDICINE

## 2019-08-23 PROCEDURE — 80069 RENAL FUNCTION PANEL: CPT

## 2019-08-23 PROCEDURE — 86920 COMPATIBILITY TEST SPIN: CPT

## 2019-08-23 RX ORDER — HEPARIN SODIUM 1000 [USP'U]/ML
4000 INJECTION, SOLUTION INTRAVENOUS; SUBCUTANEOUS ONCE
Status: COMPLETED | OUTPATIENT
Start: 2019-08-23 | End: 2019-08-23

## 2019-08-23 RX ORDER — HEPARIN SODIUM 1000 [USP'U]/ML
4000 INJECTION, SOLUTION INTRAVENOUS; SUBCUTANEOUS ONCE
Status: DISCONTINUED | OUTPATIENT
Start: 2019-08-23 | End: 2019-08-23

## 2019-08-23 RX ORDER — HYDROCODONE BITARTRATE AND ACETAMINOPHEN 500; 5 MG/1; MG/1
TABLET ORAL
Status: DISCONTINUED | OUTPATIENT
Start: 2019-08-23 | End: 2019-08-23 | Stop reason: HOSPADM

## 2019-08-23 RX ADMIN — CHLORHEXIDINE GLUCONATE 15 ML: 1.2 RINSE ORAL at 09:08

## 2019-08-23 RX ADMIN — HEPARIN SODIUM 4000 UNITS: 1000 INJECTION INTRAVENOUS; SUBCUTANEOUS at 05:08

## 2019-08-23 RX ADMIN — LEVALBUTEROL HYDROCHLORIDE 0.63 MG: 0.63 SOLUTION RESPIRATORY (INHALATION) at 09:08

## 2019-08-23 RX ADMIN — ASPIRIN 81 MG: 81 TABLET, COATED ORAL at 09:08

## 2019-08-23 RX ADMIN — PANTOPRAZOLE SODIUM 20 MG: 20 TABLET, DELAYED RELEASE ORAL at 06:08

## 2019-08-23 RX ADMIN — MUPIROCIN: 20 OINTMENT TOPICAL at 09:08

## 2019-08-23 RX ADMIN — CLOPIDOGREL BISULFATE 75 MG: 75 TABLET, FILM COATED ORAL at 09:08

## 2019-08-23 RX ADMIN — ATORVASTATIN CALCIUM 20 MG: 20 TABLET, FILM COATED ORAL at 09:08

## 2019-08-23 RX ADMIN — PAROXETINE HYDROCHLORIDE 20 MG: 20 TABLET, FILM COATED ORAL at 06:08

## 2019-08-23 NOTE — PLAN OF CARE
Problem: Fall Injury Risk  Goal: Absence of Fall and Fall-Related Injury  Outcome: Ongoing (interventions implemented as appropriate)  Encouraged with following safety measures and to call nurse for assistance.

## 2019-08-23 NOTE — PROGRESS NOTES
[facility], [facility]  Nephrology  Progress Note    Patient Name: Radha Jurado  MRN: [id number]  Admission Date: 8/20/2019  Hospital Length of Stay: 3 days  Attending Provider: Joseph Bello MD   Primary Care Physician: Jeovany Carpio MD  Principal Problem:Peripheral vascular disease, unspecified      Subjective:     Interval History: feels 'ok' this am; denies any specific complaints; s/p HD on 8/21/19 w/ net UF approx 1.4L    Review of patient's allergies indicates:   Allergen Reactions    Dulcolax [bisacodyl] Other (See Comments)     Sweating a lot, weakness    Docusate sodium      Other reaction(s): Makes sweat profusely; Weak    Dulcagen      Current Facility-Administered Medications   Medication Frequency    0.9%  NaCl infusion Continuous    0.9%  NaCl infusion PRN    0.9%  NaCl infusion Once    amLODIPine tablet 10 mg QHS    aspirin EC tablet 81 mg Daily    atorvastatin tablet 20 mg Daily    chlorhexidine 0.12 % solution 15 mL BID    clevidipine (CLEVIPREX) infusion 0.5 mg/mL Continuous    clopidogrel tablet 75 mg Daily    hydrALAZINE tablet 50 mg TID    HYDROmorphone injection 1 mg Q4H PRN    levalbuterol nebulizer solution 0.63 mg BID    metoprolol succinate (TOPROL-XL) 24 hr tablet 50 mg Daily    mupirocin 2 % ointment BID    ondansetron injection 4 mg Q6H PRN    oxyCODONE immediate release tablet 5 mg Q4H PRN    pantoprazole EC tablet 20 mg Before breakfast    paroxetine tablet 20 mg QAM    sodium chloride 0.9% flush 10 mL PRN       Objective:     Vital Signs (Most Recent):  Temp: 98.5 °F (36.9 °C) (08/23/19 0300)  Pulse: (!) 50 (08/23/19 0600)  Resp: (!) 22 (08/23/19 0300)  BP: (!) 123/52 (08/23/19 0600)  SpO2: (!) 92 % (08/23/19 0600)  O2 Device (Oxygen Therapy): room air (08/23/19 0301) Vital Signs (24h Range):  Temp:  [98.2 °F (36.8 °C)-98.9 °F (37.2 °C)] 98.5 °F (36.9 °C)  Pulse:  [49-66] 50  Resp:  [18-60] 22  SpO2:  [87 %-98 %] 92 %  BP: ()/(44-87) 123/52      Weight: 51.8 kg (114 lb 3.2 oz) (08/23/19 0600)  Body mass index is 21.58 kg/m².  Body surface area is 1.49 meters squared.    I/O last 3 completed shifts:  In: 990 [P.O.:990]  Out: 1 [Stool:1]    Physical Exam   Constitutional: She is oriented to person, place, and time. She appears well-developed and well-nourished. No distress.   HENT:   Head: Normocephalic and atraumatic.   Eyes: Conjunctivae are normal. Right eye exhibits no discharge. Left eye exhibits no discharge. No scleral icterus.   Neck: Normal range of motion. Neck supple. No JVD present.   Cardiovascular: Normal rate, regular rhythm, normal heart sounds and intact distal pulses. Exam reveals no gallop and no friction rub.   No murmur heard.  Pulmonary/Chest: Effort normal and breath sounds normal. No respiratory distress. She has no rales.   Abdominal: Soft. She exhibits no distension and no mass. There is no tenderness.   Hypoactive BS in all quadrants   Genitourinary:   Genitourinary Comments: Deferred   Musculoskeletal: Normal range of motion. She exhibits no edema, tenderness or deformity.   L UE AV Fistula w/ palpable thrill   Neurological: She is alert and oriented to person, place, and time. No cranial nerve deficit.   Skin: Skin is warm and dry. No rash noted. She is not diaphoretic. No erythema. No pallor.   R IJ TDC w/ clean exit-site noted   Psychiatric: She has a normal mood and affect. Her behavior is normal.   Nursing note and vitals reviewed.      Significant Labs:sure  Cardiac Markers: No results for input(s): CKMB, TROPONINT, MYOGLOBIN in the last 168 hours.  CBC:   Recent Labs   Lab 08/23/19  0437   WBC 14.67*   RBC 2.24*   HGB 7.1*   HCT 22.2*      MCV 99*   MCH 31.7*   MCHC 32.0     CMP:   Recent Labs   Lab 08/23/19  0437   GLU 90   CALCIUM 8.3*   ALBUMIN 2.8*   *   K 3.5   CO2 26   CL 97   BUN 48*   CREATININE 5.1*     Microbiology Results (last 7 days)     ** No results found for the last 168 hours. **        No  results for input(s): COLORU, CLARITYU, SPECGRAV, PHUR, PROTEINUA, GLUCOSEU, BILIRUBINCON, BLOODU, WBCU, RBCU, BACTERIA, MUCUS, NITRITE, LEUKOCYTESUR, UROBILINOGEN, HYALINECASTS in the last 168 hours.    Significant Imaging:  X-Ray: Reviewed    Assessment/Plan:     Active Diagnoses:    Diagnosis Date Noted POA    PRINCIPAL PROBLEM:  Peripheral vascular disease, unspecified [I73.9] 08/20/2019 Yes    Hypokalemia [E87.6] 08/20/2019 Yes      Problems Resolved During this Admission:     1. ESRD (HD-MWF via R IJ TDC)- clinically stable at present; no overt volume overload, significant electrolyte perturbations nor acid-base disturbance; no active issues    -maintenance HD (duration: 3h; UF Goal: 1-2L as tolerated; Standard 'bath'; Access: TDC; Qb: 300 ml/min, Qd: 2x BFR; 12.5-25 gm 25% Albumin w/ HD for BP support)    -DAILY renal function panel to document sCr trend, optimize HD electrolyte 'bath' & assess response to therapy    -avoid nephrotoxic agents (NSAIDs, IV contrast dye)    -renally dose all appropriate medications, including antibiotics     2. HTN- clinically stable at present now w/ relative hypotension (SBP: < 100); no active issues    -hold anti-HTN medications for now & reassess later     3. ANEMIA- clinically stable at present; H/H NOT AT GOAL (7.1/22.2); no active issues    -strongly recommend 2U pRBCs w/ HD this am & trend daily CBC (H/H) to effect optimal blood-loss surveillance (probably needs workup)     4. SECONDARY HYPERPARATHYROIDISM (sHPT)- clinically stable at present on maintenance therapy; no active issues    -continue dietary binder/Vitamin D +/- HD-associated calcimimetic therapy (Cinacalcet vs Etelcalcetide)    5. PVD (s/p R Axillo-Femoral Bypass Surgery; PoD #3)- clinically stable at present; no active issues    -management per Primary Service (Vascular Surgery)    Thank you for your consult. I will follow-up with patient. Please contact us if you have any additional questions.    Aleks  ANIA Raya III, MD  [facility]  [facility]  [phone] (C)

## 2019-08-23 NOTE — DISCHARGE SUMMARY
This patient brought in for elective revascularization extremities.  She had bilateral aortoiliac occlusive disease.  She was scheduled for axillo femoral and femoral femoral bypass.  She had routine laboratory work which was acceptable for anesthesia and surgery.  On physical exam she diminished pulses in the extremities.  She was brought in and underwent the procedure as described.  There were no perioperative complications.  She remained in the ICU afterwards.  She had the Nephrology Service consulted due to her chronic renal insufficiency.  She was dialyzed as necessary.  She had no wound complications.  Perfusion to the legs and feet remain satisfactory after the procedure.  Pulses were maintained.  At the time of discharge she is ambulatory and tolerating a diet her surgical wounds are clean and dry.  She is given a prescription for Norco for pain.  Her other home medicines were resumed.  She instructions see me in 2 weeks.  Her condition is satisfactory.  Her diet and activity were unchanged.

## 2019-08-23 NOTE — CARE UPDATE
08/22/19 2023   Patient Assessment/Suction   Level of Consciousness (AVPU) alert   All Lung Fields Breath Sounds clear   PRE-TX-O2   O2 Device (Oxygen Therapy) room air   SpO2 (!) 93 %   Pulse Oximetry Type Continuous   $ Pulse Oximetry - Multiple Charge Pulse Oximetry - Multiple   Pulse (!) 55   Resp (!) 28   Aerosol Therapy   $ Aerosol Therapy Charges Aerosol Treatment   Daily Review of Necessity (SVN) completed   Respiratory Treatment Status (SVN) given   Treatment Route (SVN) mask   Patient Position (SVN) semi-Whitley's   Post Treatment Assessment (SVN) breath sounds improved   Signs of Intolerance (SVN) none   Breath Sounds Post-Respiratory Treatment   Throughout All Fields Post-Treatment All Fields   Throughout All Fields Post-Treatment aeration increased   Post-treatment Heart Rate (beats/min) 52   Post-treatment Resp Rate (breaths/min) 20

## 2019-08-23 NOTE — DISCHARGE INSTRUCTIONS
Keep your dressing on. You may shower, just let the water fall over dressing, and pat dry when drying off.

## 2019-08-23 NOTE — PROGRESS NOTES
Tx complete, per orders.   Net UF 2700 mL, with 700 mL being prior blood transfusion fluid given.   Catheter intact, with no complications noted. Locked with heparin; see MAR.       08/23/19 1733   Vital Signs   Temp 98.6 °F (37 °C)   Temp src Oral   Pulse (!) 39   Heart Rate Source Monitor   Resp 16   SpO2 97 %   Pulse Oximetry Type Continuous   O2 Device (Oxygen Therapy) room air   BP (!) 127/54   Post-Hemodialysis Assessment   Rinseback Volume (mL) 250 mL   Blood Volume Processed (Liters) 47.8 L   Dialyzer Clearance Lightly streaked   Duration of Treatment (minutes) 180 minutes   Hemodialysis Intake (mL) 500 mL   Total UF (mL) 3200 mL   Net Fluid Removal 2700   Patient Response to Treatment Tolerated well   Post-Treatment Weight 51.2 kg (112 lb 14 oz)   Treatment Weight Change -1.2   Post-Hemodialysis Comments Tx complete, with no complications throughout.

## 2019-08-25 LAB
HBV CORE AB SERPL QL IA: NEGATIVE
HBV SURFACE AB SER QL: REACTIVE
HBV SURFACE AG SERPL QL IA: NEGATIVE

## 2019-08-27 ENCOUNTER — TELEPHONE (OUTPATIENT)
Dept: VASCULAR SURGERY | Facility: CLINIC | Age: 69
End: 2019-08-27

## 2019-08-27 NOTE — TELEPHONE ENCOUNTER
----- Message from Fara Hutson sent at 8/27/2019 10:03 AM CDT -----  Type: Needs Medical Advice    Who Called:  Patient  Pharmacy name and phone #:    [facility] - [place] - [address]  [address] [place] [address]  Phone: [phone] Fax: [phone]    Best Call Back Number: [phone] (home)     Additional Information: States that she had her surgery 8/20/19 and got out of the hospital on 8/23/19. The doctor sent in her pain medication but dated it for 8/29/19. She is in extreme pain now and cannot understand why the doctor dated it so far in the future after her surgery. Also, she said her right elbow is hurting big time where the doctor put an IV. She wants to know if she should put ice or heat on this. Please call to advise.

## 2019-08-27 NOTE — TELEPHONE ENCOUNTER
Pt to take tramadol until can  rx for [place] on Thursday. Advised to use warm compress at IV site for swelling and discomfort. Pt has po appt on 9/4 in [place]. To call if have concerns before then.

## 2019-08-29 ENCOUNTER — TELEPHONE (OUTPATIENT)
Dept: VASCULAR SURGERY | Facility: CLINIC | Age: 69
End: 2019-08-29

## 2019-08-29 NOTE — TELEPHONE ENCOUNTER
----- Message from Fara Hutson sent at 8/29/2019  9:44 AM CDT -----  Type: Needs Medical Advice    Who Called:  Patient  Best Call Back Number: [phone] (home)     Additional Information: Had surgery on 8/10/19. Today she woke up with bumps all over body that are very itchy. Surgery site is very itchy, as well. Please call to advise.

## 2019-08-29 NOTE — TELEPHONE ENCOUNTER
Pt calling reporting itching with pain rx. Will try Benedryl OTC every 4 hours. To call if no improvement.

## 2019-09-04 ENCOUNTER — OFFICE VISIT (OUTPATIENT)
Dept: VASCULAR SURGERY | Facility: CLINIC | Age: 69
End: 2019-09-04
Payer: MEDICARE

## 2019-09-04 VITALS
HEART RATE: 83 BPM | WEIGHT: 107 LBS | DIASTOLIC BLOOD PRESSURE: 65 MMHG | HEIGHT: 61 IN | SYSTOLIC BLOOD PRESSURE: 129 MMHG | BODY MASS INDEX: 20.2 KG/M2

## 2019-09-04 DIAGNOSIS — I73.9 PAD (PERIPHERAL ARTERY DISEASE): Primary | ICD-10-CM

## 2019-09-04 PROCEDURE — 99024 PR POST-OP FOLLOW-UP VISIT: ICD-10-PCS | Mod: S$GLB,,, | Performed by: THORACIC SURGERY (CARDIOTHORACIC VASCULAR SURGERY)

## 2019-09-04 PROCEDURE — 99999 PR PBB SHADOW E&M-EST. PATIENT-LVL III: ICD-10-PCS | Mod: PBBFAC,,, | Performed by: THORACIC SURGERY (CARDIOTHORACIC VASCULAR SURGERY)

## 2019-09-04 PROCEDURE — 99024 POSTOP FOLLOW-UP VISIT: CPT | Mod: S$GLB,,, | Performed by: THORACIC SURGERY (CARDIOTHORACIC VASCULAR SURGERY)

## 2019-09-04 PROCEDURE — 99999 PR PBB SHADOW E&M-EST. PATIENT-LVL III: CPT | Mod: PBBFAC,,, | Performed by: THORACIC SURGERY (CARDIOTHORACIC VASCULAR SURGERY)

## 2019-09-04 RX ORDER — HYDROCODONE BITARTRATE AND ACETAMINOPHEN 10; 325 MG/1; MG/1
1 TABLET ORAL 4 TIMES DAILY PRN
Refills: 0 | COMMUNITY
Start: 2019-08-29 | End: 2020-09-09

## 2019-09-04 NOTE — PROGRESS NOTES
This patient is status post right axillo to femoral artery bypass and femoral-femoral bypass.  She did returns to the office today in follow-up.  She has done well overall and has no major limitations.  She is walking without claudication.  Her medicines are noted and are part of the epic record.  Her problem list was reviewed.  On physical exam her vital signs are stable.  Her surgical wounds are clean and dry.  She has palpable pedal pulses.  At this point she has had a satisfactory result with revascularization of the lower extremities.  We should get a repeat arterial duplex of the extremities and for 6 months.  She should continue medical management.  She should quit smoking.  Hopefully she will continue to do well.

## 2019-12-03 ENCOUNTER — OFFICE VISIT (OUTPATIENT)
Dept: PRIMARY CARE CLINIC | Facility: CLINIC | Age: 69
End: 2019-12-03
Payer: MEDICARE

## 2019-12-03 VITALS
HEART RATE: 92 BPM | TEMPERATURE: 98 F | HEIGHT: 61 IN | OXYGEN SATURATION: 99 % | BODY MASS INDEX: 18.82 KG/M2 | WEIGHT: 99.69 LBS | SYSTOLIC BLOOD PRESSURE: 138 MMHG | DIASTOLIC BLOOD PRESSURE: 62 MMHG | RESPIRATION RATE: 16 BRPM

## 2019-12-03 DIAGNOSIS — Z23 NEED FOR VACCINATION: ICD-10-CM

## 2019-12-03 DIAGNOSIS — R62.7 FTT (FAILURE TO THRIVE) IN ADULT: Primary | ICD-10-CM

## 2019-12-03 DIAGNOSIS — Z99.2 ESRD ON DIALYSIS: ICD-10-CM

## 2019-12-03 DIAGNOSIS — R63.4 WEIGHT LOSS: ICD-10-CM

## 2019-12-03 DIAGNOSIS — N18.6 ESRD ON DIALYSIS: ICD-10-CM

## 2019-12-03 DIAGNOSIS — G44.209 TENSION-TYPE HEADACHE, NOT INTRACTABLE, UNSPECIFIED CHRONICITY PATTERN: ICD-10-CM

## 2019-12-03 PROCEDURE — G0009 ADMIN PNEUMOCOCCAL VACCINE: HCPCS | Mod: 59,S$GLB,, | Performed by: INTERNAL MEDICINE

## 2019-12-03 PROCEDURE — 1101F PT FALLS ASSESS-DOCD LE1/YR: CPT | Mod: S$GLB,,, | Performed by: INTERNAL MEDICINE

## 2019-12-03 PROCEDURE — 90471 TD VACCINE GREATER THAN OR EQUAL TO 7YO PRESERVATIVE FREE IM: ICD-10-PCS | Mod: S$GLB,,, | Performed by: INTERNAL MEDICINE

## 2019-12-03 PROCEDURE — 1159F MED LIST DOCD IN RCRD: CPT | Mod: S$GLB,,, | Performed by: INTERNAL MEDICINE

## 2019-12-03 PROCEDURE — 90670 PNEUMOCOCCAL CONJUGATE VACCINE 13-VALENT LESS THAN 5YO & GREATER THAN: ICD-10-PCS | Mod: S$GLB,,, | Performed by: INTERNAL MEDICINE

## 2019-12-03 PROCEDURE — 99999 PR PBB SHADOW E&M-EST. PATIENT-LVL IV: ICD-10-PCS | Mod: PBBFAC,,, | Performed by: INTERNAL MEDICINE

## 2019-12-03 PROCEDURE — 99213 PR OFFICE/OUTPT VISIT, EST, LEVL III, 20-29 MIN: ICD-10-PCS | Mod: 25,S$GLB,, | Performed by: INTERNAL MEDICINE

## 2019-12-03 PROCEDURE — 1126F PR PAIN SEVERITY QUANTIFIED, NO PAIN PRESENT: ICD-10-PCS | Mod: S$GLB,,, | Performed by: INTERNAL MEDICINE

## 2019-12-03 PROCEDURE — G0009 PNEUMOCOCCAL CONJUGATE VACCINE 13-VALENT LESS THAN 5YO & GREATER THAN: ICD-10-PCS | Mod: 59,S$GLB,, | Performed by: INTERNAL MEDICINE

## 2019-12-03 PROCEDURE — 90714 TD VACC NO PRESV 7 YRS+ IM: CPT | Mod: S$GLB,,, | Performed by: INTERNAL MEDICINE

## 2019-12-03 PROCEDURE — 99999 PR PBB SHADOW E&M-EST. PATIENT-LVL IV: CPT | Mod: PBBFAC,,, | Performed by: INTERNAL MEDICINE

## 2019-12-03 PROCEDURE — 90471 IMMUNIZATION ADMIN: CPT | Mod: S$GLB,,, | Performed by: INTERNAL MEDICINE

## 2019-12-03 PROCEDURE — 90714 TD VACCINE GREATER THAN OR EQUAL TO 7YO PRESERVATIVE FREE IM: ICD-10-PCS | Mod: S$GLB,,, | Performed by: INTERNAL MEDICINE

## 2019-12-03 PROCEDURE — 1159F PR MEDICATION LIST DOCUMENTED IN MEDICAL RECORD: ICD-10-PCS | Mod: S$GLB,,, | Performed by: INTERNAL MEDICINE

## 2019-12-03 PROCEDURE — 1126F AMNT PAIN NOTED NONE PRSNT: CPT | Mod: S$GLB,,, | Performed by: INTERNAL MEDICINE

## 2019-12-03 PROCEDURE — 99213 OFFICE O/P EST LOW 20 MIN: CPT | Mod: 25,S$GLB,, | Performed by: INTERNAL MEDICINE

## 2019-12-03 PROCEDURE — 90670 PCV13 VACCINE IM: CPT | Mod: S$GLB,,, | Performed by: INTERNAL MEDICINE

## 2019-12-03 PROCEDURE — 1101F PR PT FALLS ASSESS DOC 0-1 FALLS W/OUT INJ PAST YR: ICD-10-PCS | Mod: S$GLB,,, | Performed by: INTERNAL MEDICINE

## 2019-12-03 RX ORDER — BUTALBITAL, ACETAMINOPHEN AND CAFFEINE 50; 325; 40 MG/1; MG/1; MG/1
1 TABLET ORAL 2 TIMES DAILY PRN
Qty: 30 TABLET | Refills: 2 | Status: SHIPPED | OUTPATIENT
Start: 2019-12-03 | End: 2020-02-03 | Stop reason: SDUPTHER

## 2019-12-03 NOTE — PROGRESS NOTES
Verified pt ID using name and . Verified allergies with patient. Administered prevnar 13 and TD in right deltoid per physician order using aseptic technique. Aspirated and no blood return noted. Pt tolerated well with no adverse reactions noted.

## 2019-12-03 NOTE — PROGRESS NOTES
Subjective:       Patient ID: Radha Jurado is a 69 y.o. female.    Chief Complaint: Follow-up (medication refills)    HPI  patient here for follow-up and refill medication patient medically stable on dialysis but losing weight due to decreased appetite no short of breath chest pain does have chronic headache no nausea vomiting abdominal pain constipation or diarrhea she had flu vaccine at day dialysis center she need tetanus and pneumonia vaccine  Review of Systems    Objective:      Physical Exam   Constitutional: She is oriented to person, place, and time. She appears well-developed and well-nourished. No distress.   thin and skinny no distress   HENT:   Head: Normocephalic and atraumatic.   Right Ear: External ear normal.   Left Ear: External ear normal.   Nose: Nose normal.   Mouth/Throat: Oropharynx is clear and moist. No oropharyngeal exudate.   Eyes: Pupils are equal, round, and reactive to light. Conjunctivae and EOM are normal. Right eye exhibits no discharge. Left eye exhibits no discharge.   Neck: Normal range of motion. Neck supple. No thyromegaly present.   Cardiovascular: Normal rate, regular rhythm, normal heart sounds and intact distal pulses. Exam reveals no gallop and no friction rub.   No murmur heard.  AV shunt left arm functioning well good thrill   Pulmonary/Chest: Effort normal and breath sounds normal. No respiratory distress. She has no wheezes. She has no rales. She exhibits no tenderness.   Abdominal: Soft. Bowel sounds are normal. She exhibits no distension. There is no tenderness. There is no rebound and no guarding.   Musculoskeletal: Normal range of motion. She exhibits no edema, tenderness or deformity.   Lymphadenopathy:     She has no cervical adenopathy.   Neurological: She is alert and oriented to person, place, and time.   Skin: Skin is warm and dry. Capillary refill takes less than 2 seconds. No rash noted. No erythema.   Psychiatric: She has a normal mood and affect.  Judgment and thought content normal.   Nursing note and vitals reviewed.      Assessment:       1. Need for vaccination    2. Tension-type headache, not intractable, unspecified chronicity pattern    3. FTT (failure to thrive) in adult    4. Weight loss    5. ESRD on dialysis        Plan:       Need for vaccination  -     (In Office Administered) Pneumococcal Conjugate Vaccine (13 Valent) (IM)  -     (In Office Administered) Td Vaccine - Preservative Free    Tension-type headache, not intractable, unspecified chronicity pattern  -     butalbital-acetaminophen-caffeine -40 mg (FIORICET, ESGIC) -40 mg per tablet; Take 1 tablet by mouth 2 (two) times daily as needed for Pain.  Dispense: 30 tablet; Refill: 2    FTT (failure to thrive) in adult  -     nut.tx.imp.renal fxn,lac-reduc 0.08 gram-1.8 kcal/mL Liqd; Take 1 Can by mouth 3 (three) times daily.  Dispense: 90 Can; Refill: 5    Weight loss  -     nut.tx.imp.renal fxn,lac-reduc 0.08 gram-1.8 kcal/mL Liqd; Take 1 Can by mouth 3 (three) times daily.  Dispense: 90 Can; Refill: 5    ESRD on dialysis  Comments:  Stable dialysis

## 2019-12-16 DIAGNOSIS — M79.641 BILATERAL HAND PAIN: ICD-10-CM

## 2019-12-16 DIAGNOSIS — M79.642 BILATERAL HAND PAIN: ICD-10-CM

## 2019-12-16 DIAGNOSIS — M19.042 ARTHRITIS OF BOTH HANDS: ICD-10-CM

## 2019-12-16 DIAGNOSIS — M19.041 ARTHRITIS OF BOTH HANDS: ICD-10-CM

## 2019-12-17 RX ORDER — TRAMADOL HYDROCHLORIDE 50 MG/1
50 TABLET ORAL EVERY 12 HOURS PRN
Qty: 30 TABLET | Refills: 0 | Status: ON HOLD | OUTPATIENT
Start: 2019-12-17 | End: 2020-09-01 | Stop reason: HOSPADM

## 2020-01-20 ENCOUNTER — PATIENT OUTREACH (OUTPATIENT)
Dept: ADMINISTRATIVE | Facility: HOSPITAL | Age: 70
End: 2020-01-20

## 2020-02-03 ENCOUNTER — OFFICE VISIT (OUTPATIENT)
Dept: PRIMARY CARE CLINIC | Facility: CLINIC | Age: 70
End: 2020-02-03
Payer: MEDICARE

## 2020-02-03 VITALS
DIASTOLIC BLOOD PRESSURE: 58 MMHG | BODY MASS INDEX: 18.18 KG/M2 | OXYGEN SATURATION: 99 % | TEMPERATURE: 98 F | WEIGHT: 106.5 LBS | RESPIRATION RATE: 18 BRPM | SYSTOLIC BLOOD PRESSURE: 148 MMHG | HEART RATE: 73 BPM | HEIGHT: 64 IN

## 2020-02-03 DIAGNOSIS — N18.6 ESRD ON DIALYSIS: ICD-10-CM

## 2020-02-03 DIAGNOSIS — G44.209 TENSION-TYPE HEADACHE, NOT INTRACTABLE, UNSPECIFIED CHRONICITY PATTERN: ICD-10-CM

## 2020-02-03 DIAGNOSIS — I73.9 PAD (PERIPHERAL ARTERY DISEASE): ICD-10-CM

## 2020-02-03 DIAGNOSIS — J44.9 CHRONIC OBSTRUCTIVE PULMONARY DISEASE, UNSPECIFIED COPD TYPE: ICD-10-CM

## 2020-02-03 DIAGNOSIS — Z99.2 ESRD ON DIALYSIS: ICD-10-CM

## 2020-02-03 DIAGNOSIS — E44.1 MALNUTRITION OF MILD DEGREE: ICD-10-CM

## 2020-02-03 PROCEDURE — 1159F PR MEDICATION LIST DOCUMENTED IN MEDICAL RECORD: ICD-10-PCS | Mod: S$GLB,,, | Performed by: INTERNAL MEDICINE

## 2020-02-03 PROCEDURE — 1126F AMNT PAIN NOTED NONE PRSNT: CPT | Mod: S$GLB,,, | Performed by: INTERNAL MEDICINE

## 2020-02-03 PROCEDURE — 1126F PR PAIN SEVERITY QUANTIFIED, NO PAIN PRESENT: ICD-10-PCS | Mod: S$GLB,,, | Performed by: INTERNAL MEDICINE

## 2020-02-03 PROCEDURE — 99213 OFFICE O/P EST LOW 20 MIN: CPT | Mod: S$GLB,,, | Performed by: INTERNAL MEDICINE

## 2020-02-03 PROCEDURE — 99999 PR PBB SHADOW E&M-EST. PATIENT-LVL IV: CPT | Mod: PBBFAC,,, | Performed by: INTERNAL MEDICINE

## 2020-02-03 PROCEDURE — 99999 PR PBB SHADOW E&M-EST. PATIENT-LVL IV: ICD-10-PCS | Mod: PBBFAC,,, | Performed by: INTERNAL MEDICINE

## 2020-02-03 PROCEDURE — 1101F PT FALLS ASSESS-DOCD LE1/YR: CPT | Mod: S$GLB,,, | Performed by: INTERNAL MEDICINE

## 2020-02-03 PROCEDURE — 99213 PR OFFICE/OUTPT VISIT, EST, LEVL III, 20-29 MIN: ICD-10-PCS | Mod: S$GLB,,, | Performed by: INTERNAL MEDICINE

## 2020-02-03 PROCEDURE — 1159F MED LIST DOCD IN RCRD: CPT | Mod: S$GLB,,, | Performed by: INTERNAL MEDICINE

## 2020-02-03 PROCEDURE — 1101F PR PT FALLS ASSESS DOC 0-1 FALLS W/OUT INJ PAST YR: ICD-10-PCS | Mod: S$GLB,,, | Performed by: INTERNAL MEDICINE

## 2020-02-03 RX ORDER — BUTALBITAL, ACETAMINOPHEN AND CAFFEINE 50; 325; 40 MG/1; MG/1; MG/1
1 TABLET ORAL 2 TIMES DAILY PRN
Qty: 30 TABLET | Refills: 2 | Status: SHIPPED | OUTPATIENT
Start: 2020-02-03 | End: 2020-05-06

## 2020-02-03 NOTE — PROGRESS NOTES
Subjective:       Patient ID: Radha Jurado is a 69 y.o. female.    Chief Complaint: Follow-up    HPI patient clinically doing quite well tolerating dialysis well no weight gain weight loss no short of breath chest pain fluid overload blood test has been done frequently according to patient results were unremarkable she does complained of intermittent headaches that his relieve with 1 Fioricet no nausea vomiting photophobia no fever chill short of breath or chest pain patient has been having blood test done by cardiologist will try to get copy of the results to [facility] to bring to our office  Review of Systems    Objective:      Physical Exam   Constitutional: She is oriented to person, place, and time. She appears well-developed and well-nourished. No distress.   HENT:   Head: Normocephalic and atraumatic.   Right Ear: External ear normal.   Left Ear: External ear normal.   Nose: Nose normal.   Mouth/Throat: Oropharynx is clear and moist. No oropharyngeal exudate.   Eyes: Pupils are equal, round, and reactive to light. Conjunctivae and EOM are normal. Right eye exhibits no discharge. Left eye exhibits no discharge.   Neck: Normal range of motion. Neck supple. No thyromegaly present.   Cardiovascular: Normal rate, regular rhythm, normal heart sounds and intact distal pulses. Exam reveals no gallop and no friction rub.   No murmur heard.  Good thrill of the left AV shin in the left arm   Pulmonary/Chest: Effort normal and breath sounds normal. No respiratory distress. She has no wheezes. She has no rales. She exhibits no tenderness.   Abdominal: Soft. Bowel sounds are normal. She exhibits no distension. There is no tenderness. There is no rebound and no guarding.   Musculoskeletal: Normal range of motion. She exhibits no edema, tenderness or deformity.   Lymphadenopathy:     She has no cervical adenopathy.   Neurological: She is alert and oriented to person, place, and time.   Skin: Skin is warm and dry. Capillary  refill takes less than 2 seconds. No rash noted. No erythema.   Psychiatric: She has a normal mood and affect. Judgment and thought content normal.   Nursing note and vitals reviewed.      Assessment:       1. Tension-type headache, not intractable, unspecified chronicity pattern    2. Malnutrition of mild degree    3. PAD (peripheral artery disease)    4. Chronic obstructive pulmonary disease, unspecified COPD type    5. ESRD on dialysis        Plan:       Tension-type headache, not intractable, unspecified chronicity pattern  -     butalbital-acetaminophen-caffeine -40 mg (FIORICET, ESGIC) -40 mg per tablet; Take 1 tablet by mouth 2 (two) times daily as needed for Pain.  Dispense: 30 tablet; Refill: 2    Malnutrition of mild degree  Comments:  Patient has been gaining weight slowly has better appetite    PAD (peripheral artery disease)  Comments:  Clinically stable status post stent the stents placement in the past deny claudication    Chronic obstructive pulmonary disease, unspecified COPD type    ESRD on dialysis  Comments:  Continue with hemodialysis 3 times a week patient has been tolerating dialysis so far

## 2020-04-06 RX ORDER — PANTOPRAZOLE SODIUM 20 MG/1
TABLET, DELAYED RELEASE ORAL
Qty: 90 TABLET | Refills: 3 | Status: SHIPPED | OUTPATIENT
Start: 2020-04-06 | End: 2020-07-31 | Stop reason: SDUPTHER

## 2020-04-08 NOTE — PROGRESS NOTES
Immunizations reviewed. [facility] reviewed. Care Everywhere reviewed. [facility] reviewed and Lipid Panel uploaded to . Pre-visit chart review completed.     Patient has been experiencing increasing pain and plans to discuss this with Orthopedics, Dr. Parker.  Did complete a cortisone injection a few months ago, was also in PT.  He may continue the Voltaren gel, 3-4 times daily as needed to painful joints.  Appreciate input from Orthopedics, continue to monitor.

## 2020-05-05 DIAGNOSIS — G44.209 TENSION-TYPE HEADACHE, NOT INTRACTABLE, UNSPECIFIED CHRONICITY PATTERN: ICD-10-CM

## 2020-05-06 RX ORDER — BUTALBITAL, ACETAMINOPHEN AND CAFFEINE 50; 325; 40 MG/1; MG/1; MG/1
1 TABLET ORAL 2 TIMES DAILY PRN
Qty: 30 TABLET | Refills: 0 | Status: SHIPPED | OUTPATIENT
Start: 2020-05-06 | End: 2020-06-02 | Stop reason: SDUPTHER

## 2020-05-11 DIAGNOSIS — I73.9 PERIPHERAL VASCULAR DISEASE, UNSPECIFIED: Primary | ICD-10-CM

## 2020-05-16 ENCOUNTER — LAB VISIT (OUTPATIENT)
Dept: INTERNAL MEDICINE | Facility: CLINIC | Age: 70
End: 2020-05-16
Payer: MEDICARE

## 2020-05-16 DIAGNOSIS — N18.6 ESRD (END STAGE RENAL DISEASE) ON DIALYSIS: ICD-10-CM

## 2020-05-16 DIAGNOSIS — Z99.2 ESRD (END STAGE RENAL DISEASE) ON DIALYSIS: ICD-10-CM

## 2020-05-16 PROCEDURE — U0003 INFECTIOUS AGENT DETECTION BY NUCLEIC ACID (DNA OR RNA); SEVERE ACUTE RESPIRATORY SYNDROME CORONAVIRUS 2 (SARS-COV-2) (CORONAVIRUS DISEASE [COVID-19]), AMPLIFIED PROBE TECHNIQUE, MAKING USE OF HIGH THROUGHPUT TECHNOLOGIES AS DESCRIBED BY CMS-2020-01-R: HCPCS

## 2020-05-17 LAB — SARS-COV-2 RNA RESP QL NAA+PROBE: NOT DETECTED

## 2020-05-19 ENCOUNTER — HOSPITAL ENCOUNTER (OUTPATIENT)
Facility: OTHER | Age: 70
Discharge: HOME | End: 2020-05-19
Attending: INTERNAL MEDICINE | Admitting: INTERNAL MEDICINE
Payer: MEDICARE

## 2020-05-19 VITALS
TEMPERATURE: 99 F | RESPIRATION RATE: 16 BRPM | OXYGEN SATURATION: 98 % | HEART RATE: 78 BPM | SYSTOLIC BLOOD PRESSURE: 190 MMHG | DIASTOLIC BLOOD PRESSURE: 84 MMHG

## 2020-05-19 DIAGNOSIS — Z99.2 ESRD ON DIALYSIS: ICD-10-CM

## 2020-05-19 DIAGNOSIS — N18.6 ESRD (END STAGE RENAL DISEASE) ON DIALYSIS: ICD-10-CM

## 2020-05-19 DIAGNOSIS — Z99.2 ESRD (END STAGE RENAL DISEASE) ON DIALYSIS: ICD-10-CM

## 2020-05-19 DIAGNOSIS — T82.590A MECHANICAL COMPLICATION OF ARTERIOVENOUS FISTULA SURGICALLY CREATED, INITIAL ENCOUNTER: Primary | ICD-10-CM

## 2020-05-19 DIAGNOSIS — N18.6 ESRD ON DIALYSIS: ICD-10-CM

## 2020-05-19 PROCEDURE — 25000003 PHARM REV CODE 250: Performed by: INTERNAL MEDICINE

## 2020-05-19 PROCEDURE — C1894 INTRO/SHEATH, NON-LASER: HCPCS | Performed by: INTERNAL MEDICINE

## 2020-05-19 PROCEDURE — 63600175 PHARM REV CODE 636 W HCPCS: Performed by: INTERNAL MEDICINE

## 2020-05-19 PROCEDURE — 36901 INTRO CATH DIALYSIS CIRCUIT: CPT | Performed by: INTERNAL MEDICINE

## 2020-05-19 PROCEDURE — 25500020 PHARM REV CODE 255: Performed by: INTERNAL MEDICINE

## 2020-05-19 RX ORDER — LIDOCAINE HYDROCHLORIDE 10 MG/ML
INJECTION INFILTRATION; PERINEURAL
Status: DISCONTINUED | OUTPATIENT
Start: 2020-05-19 | End: 2020-05-19 | Stop reason: HOSPADM

## 2020-05-19 RX ORDER — MIDAZOLAM HYDROCHLORIDE 2 MG/2ML
1 INJECTION, SOLUTION INTRAMUSCULAR; INTRAVENOUS ONCE
Status: CANCELLED | OUTPATIENT
Start: 2020-05-19 | End: 2020-05-19

## 2020-05-19 RX ORDER — HEPARIN SOD,PORCINE/0.9 % NACL 1000/500ML
INTRAVENOUS SOLUTION INTRAVENOUS
Status: DISCONTINUED | OUTPATIENT
Start: 2020-05-19 | End: 2020-05-19 | Stop reason: HOSPADM

## 2020-05-19 RX ORDER — FENTANYL CITRATE 50 UG/ML
25 INJECTION, SOLUTION INTRAMUSCULAR; INTRAVENOUS ONCE
Status: CANCELLED | OUTPATIENT
Start: 2020-05-19 | End: 2020-05-19

## 2020-05-19 NOTE — H&P
[address] CheckIn  History & Physical    Subjective:      Chief Complaint/Reason for Admission: fistulogram    Radha Jurado is a 70 y.o. female with ESRD on HD with dr. Eller dialyze via LUE brachiobasilic AVF presents for difficulty to cannulate. She denies any prolong bleeding, venous or arterial alarms. She was last dialyze yesterday with no issues. Her access was created one year ago.    Past Medical History:   Diagnosis Date    Arthritis     Blood transfusion     Cardiac angina     SL NTG 2-3x wk    Carotid artery occlusion     CHF (congestive heart failure)     COPD exacerbation     Coronary artery disease     w/chronic angina    Depression     Gastric peptic ulcer     HTN (hypertension)     Hyperlipidemia     Migraine headache     PAD (peripheral artery disease)     Renal insufficiency 10/23/12    last creatinine 2.1     Past Surgical History:   Procedure Laterality Date    APPENDECTOMY  unknown    BLADDER SUSPENSION N/A 2005    with Mesh    CARDIAC CATHETERIZATION  2009    [facility] w/PCI [facility]    CHOLECYSTECTOMY N/A 2017    CREATION OF AXILLARY-FEMORAL ARTERY BYPASS WITH GRAFT Right 8/20/2019    Procedure: CREATION, BYPASS, ARTERIAL, AXILLARY TO FEMORAL, USING GRAFT;  Surgeon: Joseph Bello MD;  Location: [facility] OR;  Service: Cardiovascular;  Laterality: Right;    FISTULOGRAM Left 8/8/2019    Procedure: FISTULOGRAM;  Surgeon: Calixto Javed MD;  Location: [facility] [place], operated by [facility] CATH LAB;  Service: Nephrology;  Laterality: Left;    HYSTERECTOMY      NEPHRECTOMY Right 1977    under developed kidney removed.    OOPHORECTOMY       Family History   Problem Relation Age of Onset    Hypertension Father     Heart attack Father     Heart disease Father     Heart failure Father      Social History     Tobacco Use    Smoking status: Current Every Day Smoker     Packs/day: 0.25     Years: 50.00     Pack years: 12.50     Start date: 1962    Smokeless tobacco: Never Used    Tobacco  comment: Do not smoke day of surgery   Substance Use Topics    Alcohol use: Yes     Comment: holidays    Drug use: No       PTA Medications   Medication Sig    albuterol-ipratropium (DUO-NEB) 2.5 mg-0.5 mg/3 mL nebulizer solution Take 3 mLs by nebulization every 6 (six) hours while awake. Rescue    amLODIPine (NORVASC) 10 MG tablet Take 10 mg by mouth every evening.    ANORO ELLIPTA 62.5-25 mcg/actuation DsDv INHALE 1 PUFF AS DIRECTED ONCE A DAY    aspirin (ECOTRIN) 81 MG EC tablet Take 81 mg by mouth once daily.      atorvastatin (LIPITOR) 20 MG tablet TAKE 1 TABLET (20 MG TOTAL) BY MOUTH ONCE DAILY.    butalbital-acetaminophen-caffeine -40 mg (FIORICET, ESGIC) -40 mg per tablet TAKE 1 TABLET BY MOUTH 2 (TWO) TIMES DAILY AS NEEDED FOR PAIN.    calcium carbonate (OS-UMAIR) 600 mg (1,500 mg) Tab Take 600 mg by mouth once daily.      clopidogrel (PLAVIX) 75 mg tablet Take 75 mg by mouth once daily.    cyproheptadine (PERIACTIN) 4 mg tablet Take 1 tablet (4 mg total) by mouth 2 (two) times daily.    hydrALAZINE (APRESOLINE) 50 MG tablet Take 50 mg by mouth 3 (three) times daily. For SBP >160    metoprolol succinate (TOPROL-XL) 50 MG 24 hr tablet Take 50 mg by mouth once daily.    pantoprazole (PROTONIX) 20 MG tablet TAKE 1 TABLET (20 MG TOTAL) BY MOUTH ONCE DAILY.    paroxetine (PAXIL) 20 MG tablet Take 1 tablet (20 mg total) by mouth every morning.    sucralfate (CARAFATE) 1 gram tablet TAKE ONE TABLET BY MOUTH THREE TIMES DAILY BEFORE MEALS AND BEDTIME    traMADol (ULTRAM) 50 mg tablet TAKE 1 TABLET (50 MG TOTAL) BY MOUTH EVERY 12 (TWELVE) HOURS AS NEEDED.    HYDROcodone-acetaminophen (NORCO)  mg per tablet Take 1 tablet by mouth 4 (four) times daily as needed.    nut.tx.imp.renal fxn,lac-reduc 0.08 gram-1.8 kcal/mL Liqd Take 1 Can by mouth 3 (three) times daily.     Review of patient's allergies indicates:   Allergen Reactions    Dulcolax [bisacodyl] Other (See Comments)      Sweating a lot, weakness    Docusate sodium      Other reaction(s): Makes sweat profusely; Weak    Dulcagen         Review of Systems   Constitutional: Negative.    HENT: Negative.    Eyes: Negative.    Respiratory: Negative.    Cardiovascular: Negative.    Genitourinary: Negative.    Musculoskeletal: Negative.    Skin: Negative.    Neurological: Negative.    Psychiatric/Behavioral: Negative.        Objective:      Vital Signs (Most Recent)  Temp: 97.2 °F (36.2 °C) (05/19/20 1028)  Pulse: 76 (05/19/20 1120)  Resp: 16 (05/19/20 1120)  BP: (!) 167/72 (05/19/20 1120)  SpO2: 95 % (05/19/20 1120)    Vital Signs Range (Last 24H):  Temp:  [97.2 °F (36.2 °C)]   Pulse:  [75-90]   Resp:  [16]   BP: (159-181)/(71-77)   SpO2:  [95 %-96 %]     Physical Exam   Constitutional: She appears well-nourished.   HENT:   Head: Atraumatic.   Eyes: EOM are normal.   Neck: Neck supple.   Cardiovascular: Normal rate and regular rhythm.   Pulmonary/Chest: Effort normal and breath sounds normal.   Abdominal: Soft. Bowel sounds are normal.   Musculoskeletal:   LUE Brachiobasiliac AVF + thrill           Assessment:      There are no hospital problems to display for this patient.      Plan:    Consent form was signed for fistulogram

## 2020-05-19 NOTE — PROCEDURES
[facility] Interventional Nephrology Service     Fistulogram Procedure Report     Date of Procedure:  05/19/2020 12:06 PM     Procedures Performed: fistulogram with no intervention with reflex angiogram     Indication: difficult cannulation     Referring Provider: Dr. Mann     Primary Surgeon: Janell Delgado MD     Assist: Brooke Ibrahim MD     Medications Administered:   Lidocaine 1%     Procedure Report in Detail:   After informed consent was obtained the patient was prepped and draped in the usual sterile fashion. Her left upper brachiobasiliac fistula was cannulated in the venous facing direction with a micropuncture system, confirmed in correct placement under fluroscopy, and a fistulogram was performed using iodinated contrast. Fistulogram revealed patent mid and distal fistula. Central vasculature was then evaluated with contrasted film revealing patent vasculature. Reflux arteriogram revealed patent JA region. More than 6cm of the brachial artery was visualized and it was patent. The micropuncture sheath was removed and direct pressure was used to achieve hemostasis. No complications occurred during the procedure.     Estimated blood loss: < 10 ml     Estimated contrast used: 12 ml     Impression/Plan:   This was a successful fistulogram with no intervention performed in the brachiobasiliac AV fistula. We will have the patient return PRN as needed. Please do refer the patient back if there are any further signs of stenosis (increased venous pressure alarms, prolonged bleeding or worsening adequacy).     Janell Delgado MD  [phone]

## 2020-05-19 NOTE — PLAN OF CARE
Radha Jurado has met all discharge criteria from Phase II. Vital Signs are stable, ambulating  without difficulty. Discharge instructions given, patient verbalized understanding. Discharged from facility via wheelchair in stable condition.

## 2020-05-19 NOTE — BRIEF OP NOTE
[address]  Brief Operative Note    Surgery Date: 5/19/2020     Surgeon(s) and Role:     * Janell Delgado MD - Primary    Assisting Surgeon: None    Pre-op Diagnosis:  ESRD (end stage renal disease) on dialysis [N18.6, Z99.2]    Post-op Diagnosis:  Post-Op Diagnosis Codes:     * ESRD (end stage renal disease) on dialysis [N18.6, Z99.2]    Procedure(s) (LRB):  FISTULOGRAM (Left)    Anesthesia: RN IV Sedation    Description of the findings of the procedure(s): The patient was prepped and draped in a sterile fashion.  This was a successful fistulogram with no intervention of the LUE brachiobasiliac AV fistula.  No complications occurred during the procedure and the patient was transferred to the post-operative nursing floor for recovery.      Estimated Blood Loss: * No values recorded between 5/19/2020 11:46 AM and 5/19/2020 11:54 AM *         Specimens:   Specimen (12h ago, onward)    None            Discharge Note    OUTCOME: Patient tolerated treatment/procedure well without complication and is now ready for discharge.    DISPOSITION: Home or Self Care    FINAL DIAGNOSIS:  <principal problem not specified>    FOLLOWUP: In clinic    DISCHARGE INSTRUCTIONS:    Discharge Procedure Orders   Call MD for:  temperature >100.4     Call MD for:  severe uncontrolled pain     Call MD for:  redness, tenderness, or signs of infection (pain, swelling, redness, odor or green/yellow discharge around incision site)     Call MD for:   Order Comments: bleeding     Activity as tolerated

## 2020-06-01 ENCOUNTER — NURSE TRIAGE (OUTPATIENT)
Dept: ADMINISTRATIVE | Facility: CLINIC | Age: 70
End: 2020-06-01

## 2020-06-02 ENCOUNTER — OFFICE VISIT (OUTPATIENT)
Dept: PRIMARY CARE CLINIC | Facility: CLINIC | Age: 70
End: 2020-06-02
Payer: MEDICARE

## 2020-06-02 DIAGNOSIS — Z72.0 TOBACCO ABUSE: ICD-10-CM

## 2020-06-02 DIAGNOSIS — N18.6 ESRD ON DIALYSIS: ICD-10-CM

## 2020-06-02 DIAGNOSIS — J44.9 CHRONIC OBSTRUCTIVE PULMONARY DISEASE, UNSPECIFIED COPD TYPE: Primary | ICD-10-CM

## 2020-06-02 DIAGNOSIS — Z99.2 ESRD ON DIALYSIS: ICD-10-CM

## 2020-06-02 DIAGNOSIS — G44.209 TENSION-TYPE HEADACHE, NOT INTRACTABLE, UNSPECIFIED CHRONICITY PATTERN: ICD-10-CM

## 2020-06-02 PROCEDURE — 1159F MED LIST DOCD IN RCRD: CPT | Mod: 95,,, | Performed by: INTERNAL MEDICINE

## 2020-06-02 PROCEDURE — 1101F PR PT FALLS ASSESS DOC 0-1 FALLS W/OUT INJ PAST YR: ICD-10-PCS | Mod: 95,,, | Performed by: INTERNAL MEDICINE

## 2020-06-02 PROCEDURE — 99441 PR PHYSICIAN TELEPHONE EVALUATION 5-10 MIN: CPT | Mod: 95,,, | Performed by: INTERNAL MEDICINE

## 2020-06-02 PROCEDURE — 99441 PR PHYSICIAN TELEPHONE EVALUATION 5-10 MIN: ICD-10-PCS | Mod: 95,,, | Performed by: INTERNAL MEDICINE

## 2020-06-02 PROCEDURE — 1159F PR MEDICATION LIST DOCUMENTED IN MEDICAL RECORD: ICD-10-PCS | Mod: 95,,, | Performed by: INTERNAL MEDICINE

## 2020-06-02 PROCEDURE — 1101F PT FALLS ASSESS-DOCD LE1/YR: CPT | Mod: 95,,, | Performed by: INTERNAL MEDICINE

## 2020-06-02 RX ORDER — BUTALBITAL, ACETAMINOPHEN AND CAFFEINE 50; 325; 40 MG/1; MG/1; MG/1
1 TABLET ORAL 2 TIMES DAILY PRN
Qty: 30 TABLET | Refills: 1 | Status: SHIPPED | OUTPATIENT
Start: 2020-06-02 | End: 2020-07-10 | Stop reason: SDUPTHER

## 2020-06-02 NOTE — PROGRESS NOTES
And the Subjective:    The patient location is: home  The chief complaint leading to consultation is: refill    Visit type: audio only follow-up    Face to Face time with patient: 12 minutes  minutes of total time spent on the encounter, which includes face to face time and non-face to face time preparing to see the patient (eg, review of tests), Obtaining and/or reviewing separately obtained history, Documenting clinical information in the electronic or other health record, Independently interpreting results (not separately reported) and communicating results to the patient/family/caregiver, or Care coordination (not separately reported).         Each patient to whom he or she provides medical services by telemedicine is:  (1) informed of the relationship between the physician and patient and the respective role of any other health care provider with respect to management of the patient; and (2) notified that he or she may decline to receive medical services by telemedicine and may withdraw from such care at any time.    Notes:    Patient ID: Radha Jurado is a 70 y.o. female.  Patient was seen by telemedicine physical exam limited and vital sign not obtainable  Chief Complaint: No chief complaint on file.    HPI  patient visit today is the routine follow-up and refill medication for her headaches patient has ESRD on dialysis 3 times a week currently doing well no short of breath chest pain dizziness syncope or dyspnea with exertion she does have history of chronic recurrent headache had workup in the past negative has been taking medication as needed for headache for years she still smoking but a lot less walk the of a pack a day  Review of Systems    Objective:      Physical Exam  on the phone see sound stable no distress coherent states doing well just need refill her medication  Assessment:       1. Chronic obstructive pulmonary disease, unspecified COPD type    2. Tension-type headache, not intractable,  unspecified chronicity pattern    3. ESRD on dialysis    4. Tobacco abuse        Plan:       Chronic obstructive pulmonary disease, unspecified COPD type  Comments:  Continue with treatment try to quit smoking    Tension-type headache, not intractable, unspecified chronicity pattern  -     butalbital-acetaminophen-caffeine -40 mg (FIORICET, ESGIC) -40 mg per tablet; Take 1 tablet by mouth 2 (two) times daily as needed for Pain.  Dispense: 30 tablet; Refill: 1    ESRD on dialysis  Comments:  Continue with dialysis 3 times a week    Tobacco abuse  -     Ambulatory referral/consult to Smoking Cessation Program; Future; Expected date: 06/11/2020

## 2020-06-10 PROBLEM — R06.02 SHORTNESS OF BREATH: Status: ACTIVE | Noted: 2020-06-10

## 2020-06-11 PROBLEM — I50.33 ACUTE ON CHRONIC DIASTOLIC CHF (CONGESTIVE HEART FAILURE): Status: ACTIVE | Noted: 2020-06-11

## 2020-06-11 PROBLEM — J90 PLEURAL EFFUSION ON RIGHT: Status: ACTIVE | Noted: 2020-06-11

## 2020-06-11 PROBLEM — J18.9 COMMUNITY ACQUIRED PNEUMONIA OF RIGHT LOWER LOBE OF LUNG: Status: ACTIVE | Noted: 2020-06-10

## 2020-06-16 ENCOUNTER — PATIENT OUTREACH (OUTPATIENT)
Dept: ADMINISTRATIVE | Facility: CLINIC | Age: 70
End: 2020-06-16

## 2020-06-16 NOTE — PATIENT INSTRUCTIONS
Pneumonia (Adult)  Pneumonia is an infection deep within the lungs. It is in the small air sacs (alveoli). Pneumonia may be caused by a virus or bacteria. Pneumonia caused by bacteria is usually treated with an antibiotic. Severe cases may need to be treated in the hospital. Milder cases can be treated at home. Symptoms usually start to get better during the first 2 days of treatment.    Home care  Follow these guidelines when caring for yourself at home:  · Rest at home for the first 2 to 3 days, or until you feel stronger. Dont let yourself get overly tired when you go back to your activities.  · Stay away from cigarette smoke - yours or other peoples.  · You may use acetaminophen or ibuprofen to control fever or pain, unless another medicine was prescribed. If you have chronic liver or kidney disease, talk with your healthcare provider before using these medicines. Also talk with your provider if youve had a stomach ulcer or gastrointestinal bleeding. Dont give aspirin to anyone younger than 18 years of age who is ill with a fever. It may cause severe liver damage.  · Your appetite may be poor, so a light diet is fine.  · Drink 6 to 8 glasses of fluids every day to make sure you are getting enough fluids. Beverages can include water, sport drinks, sodas without caffeine, juices, tea, or soup. Fluids will help loosen secretions in the lung. This will make it easier for you to cough up the phlegm (sputum). If you also have heart or kidney disease, check with your healthcare provider before you drink extra fluids.  · Take antibiotic medicine prescribed until it is all gone, even if you are feeling better after a few days.  Follow-up care  Follow up with your healthcare provider in the next 2 to 3 days, or as advised. This is to be sure the medicine is helping you get better.  If you are 65 or older, you should get a pneumococcal vaccine and a yearly flu (influenza) shot. You should also get these vaccines if  you have chronic lung disease like asthma, emphysema, or COPD. Recently, a second type of pneumonia vaccine has become available for everyone over 65 years old. This is in addition to the previous vaccine. Ask your provider about this.  When to seek medical advice  Call your healthcare provider right away if any of these occur:  · You dont get better within the first 48 hours of treatment  · Shortness of breath gets worse  · Rapid breathing (more than 25 breaths per minute)  · Coughing up blood  · Chest pain gets worse with breathing  · Fever of 100.4°F (38°C) or higher that doesnt get better with fever medicine  · Weakness, dizziness, or fainting that gets worse  · Thirst or dry mouth that gets worse  · Sinus pain, headache, or a stiff neck  · Chest pain not caused by coughing  Date Last Reviewed: 1/1/2017  © 2688-6250 [employer], [place]. [address] [place] [address]. All rights reserved. This information is not intended as a substitute for professional medical care. Always follow your healthcare professional's instructions.

## 2020-06-22 PROBLEM — R05.9 COUGH: Status: ACTIVE | Noted: 2020-06-22

## 2020-06-22 PROBLEM — R09.02 HYPOXEMIA: Status: ACTIVE | Noted: 2020-06-22

## 2020-06-23 PROBLEM — Z71.3 ENCOUNTER FOR DIETARY CONSULTATION: Chronic | Status: ACTIVE | Noted: 2020-06-23

## 2020-06-29 ENCOUNTER — PATIENT OUTREACH (OUTPATIENT)
Dept: ADMINISTRATIVE | Facility: CLINIC | Age: 70
End: 2020-06-29

## 2020-06-29 NOTE — PATIENT INSTRUCTIONS
"Dyspnea (Shortness Of Breath)  Shortness of Breath (also known as "Dyspnea") is the sense that you can't catch your breath or can't get enough air.  Dyspnea can be caused by many different conditions such as:   Acute asthma attack   Worsening of emphysema (also called "COPD") -- a lung disease that is caused by smoking   A mucus plug blocks a large air passage in the lung -- this can occur with emphysema or chronic bronchitis   Congestive Heart Failure ("CHF") -- when a weak heart muscle allows excess fluid to collect in the lungs   Panic attacks, anxiety -- fear can cause rapid breathing ("hyperventilation")   Pneumonia -- infection in the lung tissue   Exposure to toxic fumes or smoke   Pulmonary embolus (blood clot to the lung)  Based on your visit today, the exact cause of your shortness of breath is not certain. Your tests do not show any of the serious causes of dyspnea. Sometimes, further testing is needed to find out if a serious problem exists. Therefore, it is important for you to watch for any new symptoms or worsening of your condition and follow up with your doctor as directed.  Home Care:   When your symptoms are better, resume your usual activities.   If you smoke, you need to stop. Join a stop-smoking program or ask your doctor for help.  Follow Up  with your doctor or as advised by our staff.  Get Prompt Medical Attention  if any of the following occur:   Increasing shortness of breath or wheezing   Redness, pain or swelling in one leg   Swelling in both legs or ankles   Unexpected weight gain   Chest, arm, shoulder, neck or upper back pain   Dizziness, weakness or fainting   Palpitations (the sense that your heart is fluttering, beating fast or hard)   Fever of 100.4ºF (38ºC) or higher, or as directed by your healthcare provider   Cough with dark colored or bloody sputum (mucus)  © 7940-2394 [employer]ie [employer]un[employer], [address] [facility], [place] [address]. All rights reserved. This " information is not intended as a substitute for professional medical care. Always follow your healthcare professional's instructions.

## 2020-07-02 DIAGNOSIS — Z12.39 BREAST CANCER SCREENING: ICD-10-CM

## 2020-07-06 ENCOUNTER — TELEPHONE (OUTPATIENT)
Dept: PRIMARY CARE CLINIC | Facility: CLINIC | Age: 70
End: 2020-07-06

## 2020-07-06 DIAGNOSIS — J18.9 PNEUMONIA DUE TO INFECTIOUS ORGANISM, UNSPECIFIED LATERALITY, UNSPECIFIED PART OF LUNG: Primary | ICD-10-CM

## 2020-07-06 NOTE — TELEPHONE ENCOUNTER
----- Message from Candy Oconnor sent at 7/6/2020 10:05 AM CDT -----  Contact:   Type: Needs Medical Advice    Who Called:  Tan, patient  Symptoms (please be specific):  Fluid in her lungs  How long has patient had these symptoms:  2 weeks  Pharmacy name and phone #:    [facility] - [place], [place] - [address]  [address]  [facility] [address]  Phone: [phone] Fax: [phone]  Best Call Back Number: [phone]  Additional Information: Calling because patient was discharged from the hospital 06-26/2020, but did not give her any antibiotics. Could you please prescribe an antibiotic for her until her appointment on Friday. Thanks.

## 2020-07-07 ENCOUNTER — TELEPHONE (OUTPATIENT)
Dept: PRIMARY CARE CLINIC | Facility: CLINIC | Age: 70
End: 2020-07-07

## 2020-07-07 RX ORDER — MINOCYCLINE HYDROCHLORIDE 100 MG/1
100 CAPSULE ORAL 2 TIMES DAILY
Qty: 20 CAPSULE | Refills: 0 | Status: SHIPPED | OUTPATIENT
Start: 2020-07-07 | End: 2020-07-17

## 2020-07-07 NOTE — TELEPHONE ENCOUNTER
Spoke with patient and let her know that medication had been sent to her pharmacy. Patient stated understanding.

## 2020-07-08 ENCOUNTER — NURSE TRIAGE (OUTPATIENT)
Dept: ADMINISTRATIVE | Facility: CLINIC | Age: 70
End: 2020-07-08

## 2020-07-08 NOTE — TELEPHONE ENCOUNTER
Attempted to contact patient on behalf of [facility]. No answer, day 13. Triage nurse will attempt call back tomorrow.   Reason for Disposition   No answer.  First attempt to contact caller.  Follow-up call scheduled within 15 minutes.    Protocols used: NO CONTACT OR DUPLICATE CONTACT CALL-A-[place]

## 2020-07-10 ENCOUNTER — OFFICE VISIT (OUTPATIENT)
Dept: PRIMARY CARE CLINIC | Facility: CLINIC | Age: 70
End: 2020-07-10
Payer: MEDICARE

## 2020-07-10 VITALS
TEMPERATURE: 99 F | SYSTOLIC BLOOD PRESSURE: 130 MMHG | RESPIRATION RATE: 18 BRPM | BODY MASS INDEX: 20.47 KG/M2 | WEIGHT: 108.44 LBS | OXYGEN SATURATION: 95 % | DIASTOLIC BLOOD PRESSURE: 50 MMHG | HEIGHT: 61 IN | HEART RATE: 87 BPM

## 2020-07-10 DIAGNOSIS — Z72.0 TOBACCO ABUSE: ICD-10-CM

## 2020-07-10 DIAGNOSIS — N18.6 ESRD ON DIALYSIS: ICD-10-CM

## 2020-07-10 DIAGNOSIS — Z99.2 ESRD ON DIALYSIS: ICD-10-CM

## 2020-07-10 DIAGNOSIS — J18.9 PNEUMONIA DUE TO INFECTIOUS ORGANISM, UNSPECIFIED LATERALITY, UNSPECIFIED PART OF LUNG: Primary | ICD-10-CM

## 2020-07-10 DIAGNOSIS — J44.9 CHRONIC OBSTRUCTIVE PULMONARY DISEASE, UNSPECIFIED COPD TYPE: ICD-10-CM

## 2020-07-10 DIAGNOSIS — R06.09 DOE (DYSPNEA ON EXERTION): ICD-10-CM

## 2020-07-10 DIAGNOSIS — G44.209 TENSION-TYPE HEADACHE, NOT INTRACTABLE, UNSPECIFIED CHRONICITY PATTERN: ICD-10-CM

## 2020-07-10 PROCEDURE — 99214 PR OFFICE/OUTPT VISIT, EST, LEVL IV, 30-39 MIN: ICD-10-PCS | Mod: 25,S$GLB,, | Performed by: INTERNAL MEDICINE

## 2020-07-10 PROCEDURE — 1125F PR PAIN SEVERITY QUANTIFIED, PAIN PRESENT: ICD-10-PCS | Mod: S$GLB,,, | Performed by: INTERNAL MEDICINE

## 2020-07-10 PROCEDURE — 1159F PR MEDICATION LIST DOCUMENTED IN MEDICAL RECORD: ICD-10-PCS | Mod: S$GLB,,, | Performed by: INTERNAL MEDICINE

## 2020-07-10 PROCEDURE — 99214 OFFICE O/P EST MOD 30 MIN: CPT | Mod: 25,S$GLB,, | Performed by: INTERNAL MEDICINE

## 2020-07-10 PROCEDURE — 1159F MED LIST DOCD IN RCRD: CPT | Mod: S$GLB,,, | Performed by: INTERNAL MEDICINE

## 2020-07-10 PROCEDURE — 3008F PR BODY MASS INDEX (BMI) DOCUMENTED: ICD-10-PCS | Mod: S$GLB,,, | Performed by: INTERNAL MEDICINE

## 2020-07-10 PROCEDURE — 3008F BODY MASS INDEX DOCD: CPT | Mod: S$GLB,,, | Performed by: INTERNAL MEDICINE

## 2020-07-10 PROCEDURE — 1101F PR PT FALLS ASSESS DOC 0-1 FALLS W/OUT INJ PAST YR: ICD-10-PCS | Mod: S$GLB,,, | Performed by: INTERNAL MEDICINE

## 2020-07-10 PROCEDURE — 99999 PR PBB SHADOW E&M-EST. PATIENT-LVL V: ICD-10-PCS | Mod: PBBFAC,,, | Performed by: INTERNAL MEDICINE

## 2020-07-10 PROCEDURE — 1125F AMNT PAIN NOTED PAIN PRSNT: CPT | Mod: S$GLB,,, | Performed by: INTERNAL MEDICINE

## 2020-07-10 PROCEDURE — 96372 THER/PROPH/DIAG INJ SC/IM: CPT | Mod: S$GLB,,, | Performed by: INTERNAL MEDICINE

## 2020-07-10 PROCEDURE — 99999 PR PBB SHADOW E&M-EST. PATIENT-LVL V: CPT | Mod: PBBFAC,,, | Performed by: INTERNAL MEDICINE

## 2020-07-10 PROCEDURE — 96372 PR INJECTION,THERAP/PROPH/DIAG2ST, IM OR SUBCUT: ICD-10-PCS | Mod: S$GLB,,, | Performed by: INTERNAL MEDICINE

## 2020-07-10 PROCEDURE — 1101F PT FALLS ASSESS-DOCD LE1/YR: CPT | Mod: S$GLB,,, | Performed by: INTERNAL MEDICINE

## 2020-07-10 RX ORDER — BUTALBITAL, ACETAMINOPHEN AND CAFFEINE 50; 325; 40 MG/1; MG/1; MG/1
1 TABLET ORAL 2 TIMES DAILY PRN
Qty: 30 TABLET | Refills: 1 | Status: SHIPPED | OUTPATIENT
Start: 2020-07-10 | End: 2020-09-09 | Stop reason: SDUPTHER

## 2020-07-10 RX ORDER — MINOCYCLINE HYDROCHLORIDE 100 MG/1
100 CAPSULE ORAL 2 TIMES DAILY
Qty: 20 CAPSULE | Refills: 0 | Status: SHIPPED | OUTPATIENT
Start: 2020-07-10 | End: 2020-07-20

## 2020-07-10 RX ORDER — TRIAMCINOLONE ACETONIDE 40 MG/ML
40 INJECTION, SUSPENSION INTRA-ARTICULAR; INTRAMUSCULAR ONCE
Status: COMPLETED | OUTPATIENT
Start: 2020-07-10 | End: 2020-07-10

## 2020-07-10 RX ADMIN — TRIAMCINOLONE ACETONIDE 40 MG: 40 INJECTION, SUSPENSION INTRA-ARTICULAR; INTRAMUSCULAR at 05:07

## 2020-07-10 NOTE — PROGRESS NOTES
Subjective:       Patient ID: Radha Jurado is a 70 y.o. female.    Chief Complaint: Hospital Follow Up (Pneumonia), Chest Pain (on/off, recent angiogram performed on 06/25/2020), and Shortness of Breath (could use portable O2 at home )    HPI  patient here for hospital follow-up she had a couple ER visit for short of breath coughing and finally admitted to MICU for pneumonia respiratory failure improved with treatment with IV antibiotic and COPD her condition improved with treatment and able to go home she is having dialysis 3 times a week has a nebulizer at home that she main using every 4-6 hours she left the hospital without oral antibiotic minus cycle in work only in a couple days ago patient states that she is doing better with antibiotic she currently feels that she back to her baseline she still have short of breath with minimal exertion and still smoking but a lot less than before does by morning from or the doctor she deny fever chest pain constipation diarrhea fever she will check for COVID-19 negative she had CT scan and multiple chest x-ray that showed right lower lobe infiltrate with mild pleural effusion compatible with pneumonia.  She also suffer with the constant headache workup in the past negative probably secondary to tension headache.  Patient states that her p.o. diet has been good and her bowel movement and urination are normal  Review of Systems    Objective:      Physical Exam  Vitals signs and nursing note reviewed.   Constitutional:       General: She is not in acute distress.     Appearance: She is well-developed.      Comments: Thin skinny   HENT:      Head: Normocephalic and atraumatic.      Right Ear: External ear normal.      Left Ear: External ear normal.      Nose: Nose normal.      Mouth/Throat:      Pharynx: No oropharyngeal exudate.   Eyes:      General:         Right eye: No discharge.         Left eye: No discharge.      Conjunctiva/sclera: Conjunctivae normal.      Pupils:  Pupils are equal, round, and reactive to light.   Neck:      Musculoskeletal: Normal range of motion and neck supple.      Thyroid: No thyromegaly.   Cardiovascular:      Rate and Rhythm: Normal rate and regular rhythm.      Pulses: Normal pulses.      Heart sounds: Normal heart sounds. No murmur. No friction rub. No gallop.    Pulmonary:      Effort: Pulmonary effort is normal. No respiratory distress.      Breath sounds: Normal breath sounds. No wheezing (Decreased breath sound with scattered expiratory wheezing and some rhonchi in the right lower lung).   Chest:      Chest wall: No tenderness.   Abdominal:      General: Bowel sounds are normal. There is no distension.      Palpations: Abdomen is soft.      Tenderness: There is no abdominal tenderness. There is no guarding or rebound.   Musculoskeletal: Normal range of motion.         General: No tenderness or deformity.   Lymphadenopathy:      Cervical: No cervical adenopathy.   Skin:     General: Skin is warm and dry.      Capillary Refill: Capillary refill takes less than 2 seconds.      Findings: No erythema or rash.   Neurological:      Mental Status: She is alert and oriented to person, place, and time.   Psychiatric:         Mood and Affect: Mood normal.         Thought Content: Thought content normal.         Judgment: Judgment normal.         Assessment:       1. Pneumonia due to infectious organism, unspecified laterality, unspecified part of lung    2. Chronic obstructive pulmonary disease, unspecified COPD type    3. Tobacco abuse    4. BLANCO (dyspnea on exertion)    5. ESRD on dialysis    6. Tension-type headache, not intractable, unspecified chronicity pattern        Plan:       Pneumonia due to infectious organism, unspecified laterality, unspecified part of lung  Comments:  Right lower lobe pneumonia improving with treatment will continue with current oral antibiotic another 7 or 10 days and repeat chest x-ray  Orders:  -     minocycline  (MINOCIN,DYNACIN) 100 MG capsule; Take 1 capsule (100 mg total) by mouth 2 (two) times daily. for 10 days  Dispense: 20 capsule; Refill: 0    Chronic obstructive pulmonary disease, unspecified COPD type  -     Six Minute Walk Test to qualify for Home Oxygen; Future  -     triamcinolone acetonide injection 40 mg    Tobacco abuse  Comments:  Continue to discussed with patient about quitting smoking completely due to the her serious lung condition  Orders:  -     Ambulatory referral/consult to Smoking Cessation Program; Future; Expected date: 07/17/2020    BLANCO (dyspnea on exertion)  Comments:  Most likely 2nd to COPD and stage will get a 6 min walk test to see if patient qualifies for oxygen supplement    ESRD on dialysis  Comments:  Continue with dialysis 3 times a week    Tension-type headache, not intractable, unspecified chronicity pattern  -     butalbital-acetaminophen-caffeine -40 mg (FIORICET, ESGIC) -40 mg per tablet; Take 1 tablet by mouth 2 (two) times daily as needed for Pain.  Dispense: 30 tablet; Refill: 1

## 2020-07-10 NOTE — PROGRESS NOTES
Verified pt ID using name and . Allergies verified. Administered Kenalog 40 MG in Right VG per physician order using aseptic technique. Aspirated and no blood return noted. Pt tolerated well with no adverse reactions noted.

## 2020-07-31 DIAGNOSIS — M79.641 BILATERAL HAND PAIN: ICD-10-CM

## 2020-07-31 DIAGNOSIS — R51.9 HEADACHE, UNSPECIFIED HEADACHE TYPE: ICD-10-CM

## 2020-07-31 DIAGNOSIS — M79.642 BILATERAL HAND PAIN: ICD-10-CM

## 2020-07-31 DIAGNOSIS — E78.5 HYPERLIPIDEMIA, UNSPECIFIED HYPERLIPIDEMIA TYPE: ICD-10-CM

## 2020-07-31 DIAGNOSIS — M19.042 ARTHRITIS OF BOTH HANDS: ICD-10-CM

## 2020-07-31 DIAGNOSIS — M19.041 ARTHRITIS OF BOTH HANDS: ICD-10-CM

## 2020-07-31 NOTE — TELEPHONE ENCOUNTER
----- Message from Anmol Tafoya sent at 2020 10:52 AM CDT -----  Contact: Self [phone]  Requesting an RX refill or new RX.  Is this a refill or new RX:  refill  RX name and strength: paroxetine (PAXIL) 20 MG tablet ()  Directions (copy/paste from chart):    Is this a 30 day or 90 day RX:  90 day   Local pharmacy or mail order pharmacy:  local  Pharmacy name and phone # (copy/paste from chart):   [facility] [place], [place] [employer] [employer] [phone] (Phone)  [phone] (Fax)  Comments:          Requesting an RX refill or new RX.  Is this a refill or new RX:  refill  RX name and strength: pantoprazole (PROTONIX) 20 MG tablet  Directions (copy/paste from chart):    Is this a 30 day or 90 day RX:  90 day   Local pharmacy or mail order pharmacy:  local  Pharmacy name and phone # (copy/paste from chart[facility]   [facility] [place] [address] [employer] [phone] (Phone)  [phone] (Fax)  Comments:          Requesting an RX refill or new RX.  Is this a refill or new RX: refill   RX name and strength: atorvastatin (LIPITOR) 20 MG tablet ()  Directions (copy/paste from chart):    Is this a 30 day or 90 day RX:  90 day   Local pharmacy or mail order pharmacy:  local  Pharmacy name and phone # (copy/paste from chart):   [facility] [place] [employer] [phone] (Phone)  [phone] (Fax)  Comments:        Requesting an RX refill or new RX.  Is this a refill or new RX:  refill  RX name and strength: traMADol (ULTRAM) 50 mg tablet  Directions (copy/paste from chart):    Is this a 30 day or 90 day RX:  630 day   Local pharmacy or mail order pharmacy:  local  Pharmacy name and phone # (copy/paste from chart):   [facility]  [place], [place] [address] [employer] [phone] (Phone)  [phone] (Fax)  Comments:

## 2020-08-01 RX ORDER — PAROXETINE HYDROCHLORIDE 20 MG/1
20 TABLET, FILM COATED ORAL EVERY MORNING
Qty: 90 TABLET | Refills: 3 | Status: SHIPPED | OUTPATIENT
Start: 2020-08-01 | End: 2021-02-18 | Stop reason: SDUPTHER

## 2020-08-01 RX ORDER — TRAMADOL HYDROCHLORIDE 50 MG/1
50 TABLET ORAL EVERY 12 HOURS PRN
Qty: 30 TABLET | Refills: 0 | OUTPATIENT
Start: 2020-08-01

## 2020-08-01 RX ORDER — ATORVASTATIN CALCIUM 20 MG/1
20 TABLET, FILM COATED ORAL DAILY
Qty: 90 TABLET | Refills: 3 | Status: SHIPPED | OUTPATIENT
Start: 2020-08-01 | End: 2021-01-01

## 2020-08-01 RX ORDER — PANTOPRAZOLE SODIUM 20 MG/1
20 TABLET, DELAYED RELEASE ORAL DAILY
Qty: 90 TABLET | Refills: 3 | Status: ON HOLD | OUTPATIENT
Start: 2020-08-01 | End: 2020-09-01 | Stop reason: HOSPADM

## 2020-08-30 PROBLEM — D64.9 SYMPTOMATIC ANEMIA: Status: ACTIVE | Noted: 2020-08-30

## 2020-09-02 ENCOUNTER — PATIENT OUTREACH (OUTPATIENT)
Dept: ADMINISTRATIVE | Facility: CLINIC | Age: 70
End: 2020-09-02

## 2020-09-02 ENCOUNTER — TELEPHONE (OUTPATIENT)
Dept: SURGERY | Facility: CLINIC | Age: 70
End: 2020-09-02

## 2020-09-02 NOTE — TELEPHONE ENCOUNTER
----- Message from Merlin Keller MD sent at 9/2/2020  1:18 PM CDT -----  That's ok, we can move up to another Monday if there's a cancellation I guess. I sent the October schedule as well  ----- Message -----  From: Mustapha Denis LPN  Sent: 9/2/2020   8:37 AM CDT  To: Merlin Keller MD Dr. MD Arianna  - The only available date I have is 09/28/2020, which is [place] [facility]. Getting Clearance for Anticoagulant therapy should not be a problem. Please respond

## 2020-09-02 NOTE — PROGRESS NOTES
Good afternoon  I am a nurse calling on behalf of [facility] from the [facility].  This is a Transitional Care Call for Radha Jurado. When you have a moment please contact us at [phone] or [phone] Monday through Friday, between the hours of 8 am to 4 pm. We look forward to speaking with you. On behalf of [facility] have a nice day.    The patient has a scheduled HOSFU appointment with Jeovany Carpio MD on 9/9 @ 3772. Message sent to Physician staff.

## 2020-09-02 NOTE — TELEPHONE ENCOUNTER
Called the patient in an attempt to schedule both an EGD  and Colonoscopy, left message for  The patient to please call back.

## 2020-09-03 ENCOUNTER — TELEPHONE (OUTPATIENT)
Dept: SURGERY | Facility: CLINIC | Age: 70
End: 2020-09-03

## 2020-09-03 DIAGNOSIS — Z12.11 SCREENING FOR COLON CANCER: Primary | ICD-10-CM

## 2020-09-03 NOTE — TELEPHONE ENCOUNTER
Called patient in reference to an Ambulatory referral to Colorectal Surgery for an EGD. It was explained to the patient an EGD( Esophagogastroduodenoscopy) is the procedure that uses a thin scope with a light and camera at its tip to examine the inside the upper digestive track.- the esophagus, stomach , stomach,and the first part of the small intestine called the duodenum,idiopathic thrombocytopenic purpura was further explained the EGD is an outpatient procedure, meaning you can go home the same day.Patient was advised to stop eating at least eight hours prior to the procedure, and to remain NPO after midnight on the day before the procedure.

## 2020-09-03 NOTE — TELEPHONE ENCOUNTER
Called patient in reference to a referral to Colorectal Surgery for colon cancer screening and an EGD.. Patient verbally consented to a Colonoscopy and EGD. Patient  requested to be scheduled for both the Colonoscopy and EGD on 09/28/2020. Patient was scheduled as requested pending Cardiac and Medication Clearance from the patient identified applicable provider..Patient was advised a designated  is required on the day of the procedures to drive the patient home and the  must be at least. 18 years old.Colonoscopy Prep instructions were thoroughly explained and discussed with the patient. Patient acknowledges understanding Prep instructions. Patient was advised the Colonoscopy Prep instructions discussed and explained on the phone , are being mailed out to the patient's address on file. Patient's address on file was verified with the patient for accuracy of mailing. Patient's medications on file was reviewed with the patient for accuracy of information. Patient denies taking  any other medications other than those listed and verified on medication profile.Patient was explained the Colonoscopy will be performed here at [facility]. Patient was further explained the Pre-Op will call one day prior to the procedure to discuss Pre-Op instructions and what time to report for the Colonoscopy. The patient was given the opportunity to ask any questions about the Colonoscopy. No further issues were discussed.

## 2020-09-03 NOTE — PATIENT INSTRUCTIONS

## 2020-09-09 ENCOUNTER — OFFICE VISIT (OUTPATIENT)
Dept: PRIMARY CARE CLINIC | Facility: CLINIC | Age: 70
End: 2020-09-09
Payer: MEDICARE

## 2020-09-09 VITALS
WEIGHT: 109.13 LBS | BODY MASS INDEX: 20.6 KG/M2 | SYSTOLIC BLOOD PRESSURE: 176 MMHG | HEART RATE: 78 BPM | OXYGEN SATURATION: 98 % | RESPIRATION RATE: 18 BRPM | TEMPERATURE: 98 F | DIASTOLIC BLOOD PRESSURE: 60 MMHG | HEIGHT: 61 IN

## 2020-09-09 DIAGNOSIS — K92.2 GASTROINTESTINAL HEMORRHAGE, UNSPECIFIED GASTROINTESTINAL HEMORRHAGE TYPE: ICD-10-CM

## 2020-09-09 DIAGNOSIS — I10 ESSENTIAL HYPERTENSION: Primary | ICD-10-CM

## 2020-09-09 DIAGNOSIS — D64.9 ANEMIA, UNSPECIFIED TYPE: ICD-10-CM

## 2020-09-09 DIAGNOSIS — N18.6 ESRD ON DIALYSIS: ICD-10-CM

## 2020-09-09 DIAGNOSIS — Z99.2 ESRD ON DIALYSIS: ICD-10-CM

## 2020-09-09 DIAGNOSIS — G44.209 TENSION-TYPE HEADACHE, NOT INTRACTABLE, UNSPECIFIED CHRONICITY PATTERN: ICD-10-CM

## 2020-09-09 DIAGNOSIS — J44.9 CHRONIC OBSTRUCTIVE PULMONARY DISEASE, UNSPECIFIED COPD TYPE: ICD-10-CM

## 2020-09-09 PROCEDURE — 1126F AMNT PAIN NOTED NONE PRSNT: CPT | Mod: S$GLB,,, | Performed by: INTERNAL MEDICINE

## 2020-09-09 PROCEDURE — 99214 PR OFFICE/OUTPT VISIT, EST, LEVL IV, 30-39 MIN: ICD-10-PCS | Mod: S$GLB,,, | Performed by: INTERNAL MEDICINE

## 2020-09-09 PROCEDURE — 1159F PR MEDICATION LIST DOCUMENTED IN MEDICAL RECORD: ICD-10-PCS | Mod: S$GLB,,, | Performed by: INTERNAL MEDICINE

## 2020-09-09 PROCEDURE — 1101F PR PT FALLS ASSESS DOC 0-1 FALLS W/OUT INJ PAST YR: ICD-10-PCS | Mod: S$GLB,,, | Performed by: INTERNAL MEDICINE

## 2020-09-09 PROCEDURE — 99214 OFFICE O/P EST MOD 30 MIN: CPT | Mod: S$GLB,,, | Performed by: INTERNAL MEDICINE

## 2020-09-09 PROCEDURE — 99999 PR PBB SHADOW E&M-EST. PATIENT-LVL V: CPT | Mod: PBBFAC,,, | Performed by: INTERNAL MEDICINE

## 2020-09-09 PROCEDURE — 1101F PT FALLS ASSESS-DOCD LE1/YR: CPT | Mod: S$GLB,,, | Performed by: INTERNAL MEDICINE

## 2020-09-09 PROCEDURE — 1126F PR PAIN SEVERITY QUANTIFIED, NO PAIN PRESENT: ICD-10-PCS | Mod: S$GLB,,, | Performed by: INTERNAL MEDICINE

## 2020-09-09 PROCEDURE — 1159F MED LIST DOCD IN RCRD: CPT | Mod: S$GLB,,, | Performed by: INTERNAL MEDICINE

## 2020-09-09 PROCEDURE — 3008F PR BODY MASS INDEX (BMI) DOCUMENTED: ICD-10-PCS | Mod: S$GLB,,, | Performed by: INTERNAL MEDICINE

## 2020-09-09 PROCEDURE — 3008F BODY MASS INDEX DOCD: CPT | Mod: S$GLB,,, | Performed by: INTERNAL MEDICINE

## 2020-09-09 PROCEDURE — 99999 PR PBB SHADOW E&M-EST. PATIENT-LVL V: ICD-10-PCS | Mod: PBBFAC,,, | Performed by: INTERNAL MEDICINE

## 2020-09-09 RX ORDER — AZITHROMYCIN 250 MG/1
TABLET, FILM COATED ORAL
Qty: 6 TABLET | Refills: 0 | Status: SHIPPED | OUTPATIENT
Start: 2020-09-09 | End: 2020-09-13

## 2020-09-09 RX ORDER — ALBUTEROL SULFATE 90 UG/1
2 AEROSOL, METERED RESPIRATORY (INHALATION) EVERY 4 HOURS PRN
Qty: 18 G | Refills: 3 | Status: SHIPPED | OUTPATIENT
Start: 2020-09-09

## 2020-09-09 RX ORDER — BUTALBITAL, ACETAMINOPHEN AND CAFFEINE 50; 325; 40 MG/1; MG/1; MG/1
1 TABLET ORAL 2 TIMES DAILY PRN
Qty: 30 TABLET | Refills: 1 | Status: SHIPPED | OUTPATIENT
Start: 2020-09-09 | End: 2020-11-18 | Stop reason: SDUPTHER
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
## 2020-09-25 ENCOUNTER — DOCUMENTATION ONLY (OUTPATIENT)
Dept: SURGERY | Facility: CLINIC | Age: 70
End: 2020-09-25
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
## 2020-09-28 ENCOUNTER — TELEPHONE (OUTPATIENT)
Dept: SURGERY | Facility: CLINIC | Age: 70
End: 2020-09-28

## 2020-09-28 RX ORDER — SODIUM CHLORIDE 0.9 % (FLUSH) 0.9 %
3 SYRINGE (ML) INJECTION
Status: CANCELLED | OUTPATIENT
Start: 2020-09-28

## 2020-09-28 RX ORDER — SODIUM CHLORIDE, SODIUM LACTATE, POTASSIUM CHLORIDE, CALCIUM CHLORIDE 600; 310; 30; 20 MG/100ML; MG/100ML; MG/100ML; MG/100ML
INJECTION, SOLUTION INTRAVENOUS CONTINUOUS
Status: CANCELLED | OUTPATIENT
Start: 2020-09-28

## 2020-09-28 NOTE — TELEPHONE ENCOUNTER
Called the patient in reference to rescheduling the cancelled Colonoscopy. Patient chose to reschedule the Colonoscopy for10/05/2020. Patient was informed the Colonoscopy Prep instructions received for the canceled Colonoscopy, remains applicable to the rescheduled Colonoscopy. Colonoscopy Prep instructions were reiterated,discussed and explained meticulously in minute,thorough detail.   Furthermore the patient was advised Cardiac Clearance was received from the [facility] from a standpoint at High risk. Patient was instructed as follows, as directed by the [facility].Hold Plavix and Aspirin 5-7 days prior to procedure and resume as directed by surgeon. Patient acquiesced understanding of instructions. The patient was offered the opportunity ask any questions about the Colonoscopy procedure, Cardiac Clearance instructions, and the related Colonoscopy Prep instructions.Patiet was additionally advised the patient will be contacted with a time to report for the procedure.  Nothing further was discussed.

## 2020-09-29 RX ORDER — POLYETHYLENE GLYCOL 3350, SODIUM SULFATE ANHYDROUS, SODIUM BICARBONATE, SODIUM CHLORIDE, POTASSIUM CHLORIDE 236; 22.74; 6.74; 5.86; 2.97 G/4L; G/4L; G/4L; G/4L; G/4L
4 POWDER, FOR SOLUTION ORAL ONCE
Qty: 4000 ML | Refills: 0 | Status: SHIPPED | OUTPATIENT
Start: 2020-09-29 | End: 2020-09-29

## 2020-10-04 ENCOUNTER — NURSE TRIAGE (OUTPATIENT)
Dept: ADMINISTRATIVE | Facility: CLINIC | Age: 70
End: 2020-10-04

## 2020-10-04 RX ORDER — SODIUM, POTASSIUM,MAG SULFATES 17.5-3.13G
2 SOLUTION, RECONSTITUTED, ORAL ORAL ONCE
Qty: 1 KIT | Refills: 0 | Status: SHIPPED | OUTPATIENT
Start: 2020-10-04 | End: 2020-10-04

## 2020-10-04 NOTE — TELEPHONE ENCOUNTER
Spoke with patient she states she has a colonoscopy scheduled for tomorrow at 10:30 am.  She states she was told that she should get a prep in a green bottle OTC.  Patient states that she was not given instruction on how to prepare for the colonoscopy.  I read previous notes by nurse Mustapha Denis.  Patient confirmed that she has stopped taking Plavix and Aspirin. She states she is not sure what she needs to do.    Spoke with on call provider Dr. Alfonso Penny whom spoke with patient and reviewed colonoscopy instructions.  Patient verbalized understanding and has no further questions.       Reason for Disposition   [1] Caller has URGENT medication question about med that PCP or specialist prescribed AND [2] triager unable to answer question    Protocols used: MEDICATION QUESTION CALL-A-AH

## 2020-11-18 ENCOUNTER — OFFICE VISIT (OUTPATIENT)
Dept: PRIMARY CARE CLINIC | Facility: CLINIC | Age: 70
End: 2020-11-18
Payer: MEDICARE

## 2020-11-18 VITALS
OXYGEN SATURATION: 96 % | HEIGHT: 60 IN | RESPIRATION RATE: 16 BRPM | SYSTOLIC BLOOD PRESSURE: 178 MMHG | WEIGHT: 108.13 LBS | DIASTOLIC BLOOD PRESSURE: 58 MMHG | HEART RATE: 75 BPM | TEMPERATURE: 99 F | BODY MASS INDEX: 21.23 KG/M2

## 2020-11-18 DIAGNOSIS — Z99.2 ESRD ON DIALYSIS: ICD-10-CM

## 2020-11-18 DIAGNOSIS — M54.50 ACUTE MIDLINE LOW BACK PAIN WITHOUT SCIATICA: Primary | ICD-10-CM

## 2020-11-18 DIAGNOSIS — G44.209 TENSION-TYPE HEADACHE, NOT INTRACTABLE, UNSPECIFIED CHRONICITY PATTERN: ICD-10-CM

## 2020-11-18 DIAGNOSIS — I10 ESSENTIAL HYPERTENSION: ICD-10-CM

## 2020-11-18 DIAGNOSIS — N18.6 ESRD ON DIALYSIS: ICD-10-CM

## 2020-11-18 PROCEDURE — 3288F FALL RISK ASSESSMENT DOCD: CPT | Mod: S$GLB,,, | Performed by: INTERNAL MEDICINE

## 2020-11-18 PROCEDURE — 1101F PR PT FALLS ASSESS DOC 0-1 FALLS W/OUT INJ PAST YR: ICD-10-PCS | Mod: S$GLB,,, | Performed by: INTERNAL MEDICINE

## 2020-11-18 PROCEDURE — 1125F PR PAIN SEVERITY QUANTIFIED, PAIN PRESENT: ICD-10-PCS | Mod: S$GLB,,, | Performed by: INTERNAL MEDICINE

## 2020-11-18 PROCEDURE — 3008F PR BODY MASS INDEX (BMI) DOCUMENTED: ICD-10-PCS | Mod: S$GLB,,, | Performed by: INTERNAL MEDICINE

## 2020-11-18 PROCEDURE — 3288F PR FALLS RISK ASSESSMENT DOCUMENTED: ICD-10-PCS | Mod: S$GLB,,, | Performed by: INTERNAL MEDICINE

## 2020-11-18 PROCEDURE — 1159F PR MEDICATION LIST DOCUMENTED IN MEDICAL RECORD: ICD-10-PCS | Mod: S$GLB,,, | Performed by: INTERNAL MEDICINE

## 2020-11-18 PROCEDURE — 1101F PT FALLS ASSESS-DOCD LE1/YR: CPT | Mod: S$GLB,,, | Performed by: INTERNAL MEDICINE

## 2020-11-18 PROCEDURE — 99999 PR PBB SHADOW E&M-EST. PATIENT-LVL V: CPT | Mod: PBBFAC,,, | Performed by: INTERNAL MEDICINE

## 2020-11-18 PROCEDURE — 99999 PR PBB SHADOW E&M-EST. PATIENT-LVL V: ICD-10-PCS | Mod: PBBFAC,,, | Performed by: INTERNAL MEDICINE

## 2020-11-18 PROCEDURE — 99213 PR OFFICE/OUTPT VISIT, EST, LEVL III, 20-29 MIN: ICD-10-PCS | Mod: S$GLB,,, | Performed by: INTERNAL MEDICINE

## 2020-11-18 PROCEDURE — 1125F AMNT PAIN NOTED PAIN PRSNT: CPT | Mod: S$GLB,,, | Performed by: INTERNAL MEDICINE

## 2020-11-18 PROCEDURE — 1159F MED LIST DOCD IN RCRD: CPT | Mod: S$GLB,,, | Performed by: INTERNAL MEDICINE

## 2020-11-18 PROCEDURE — 3008F BODY MASS INDEX DOCD: CPT | Mod: S$GLB,,, | Performed by: INTERNAL MEDICINE

## 2020-11-18 PROCEDURE — 99213 OFFICE O/P EST LOW 20 MIN: CPT | Mod: S$GLB,,, | Performed by: INTERNAL MEDICINE

## 2020-11-18 RX ORDER — LIDOCAINE 50 MG/G
1 PATCH TOPICAL DAILY
Qty: 15 PATCH | Refills: 2 | Status: SHIPPED | OUTPATIENT
Start: 2020-11-18 | End: 2021-01-19

## 2020-11-18 RX ORDER — HYDRALAZINE HYDROCHLORIDE 25 MG/1
1 TABLET, FILM COATED ORAL 2 TIMES DAILY
COMMUNITY
Start: 2020-11-14 | End: 2022-01-01

## 2020-11-18 RX ORDER — METOPROLOL SUCCINATE 50 MG/1
1 TABLET, EXTENDED RELEASE ORAL DAILY
COMMUNITY
Start: 2020-11-14

## 2020-11-18 RX ORDER — BUTALBITAL, ACETAMINOPHEN AND CAFFEINE 50; 325; 40 MG/1; MG/1; MG/1
1 TABLET ORAL 2 TIMES DAILY PRN
Qty: 30 TABLET | Refills: 1 | Status: SHIPPED | OUTPATIENT
Start: 2020-11-18 | End: 2020-12-18 | Stop reason: SDUPTHER

## 2020-11-18 NOTE — PROGRESS NOTES
Subjective:       Patient ID: Radha Jurado is a 70 y.o. female.    Chief Complaint: Follow-up (Hypertension ) and Medication Refill    HPI   patient medically stable getting dialysis 3 times a week tolerating it well she had the flu vaccine and pneumococcal vaccine at the dialysis center her visit today for refill her medication for her chronic tension headache and she also having lower back pain deny any trauma deny any radiculopathy symptom denies abdominal pain nausea vomiting diarrhea short of breath or chest pain.  Patient recently had EGD done that show diffuse gastritis better with Protonix and she had attempted cardiac catheterization with Dr. De La Fuente but procedure was aborted due to not able to find access   Review of Systems    Objective:      Physical Exam  Vitals signs and nursing note reviewed.   Constitutional:       General: She is not in acute distress.     Appearance: She is well-developed.   HENT:      Head: Normocephalic and atraumatic.      Right Ear: External ear normal.      Left Ear: External ear normal.      Nose: Nose normal.      Mouth/Throat:      Pharynx: No oropharyngeal exudate.   Eyes:      Extraocular Movements: Extraocular movements intact.      Conjunctiva/sclera: Conjunctivae normal.      Pupils: Pupils are equal, round, and reactive to light.   Neck:      Musculoskeletal: Normal range of motion and neck supple.      Thyroid: No thyromegaly.   Cardiovascular:      Rate and Rhythm: Normal rate and regular rhythm.      Heart sounds: Normal heart sounds. No murmur. No friction rub. No gallop.    Pulmonary:      Effort: Pulmonary effort is normal. No respiratory distress.      Breath sounds: Normal breath sounds. No wheezing or rales.   Abdominal:      General: Bowel sounds are normal. There is no distension.      Palpations: Abdomen is soft.      Tenderness: There is no abdominal tenderness. There is no guarding.   Musculoskeletal: Normal range of motion.         General:  Tenderness (Tenderness in the middle lower back with palpation) present. No deformity.   Lymphadenopathy:      Cervical: No cervical adenopathy.   Skin:     General: Skin is warm and dry.      Findings: No erythema or rash.   Neurological:      Mental Status: She is alert and oriented to person, place, and time.   Psychiatric:         Thought Content: Thought content normal.         Judgment: Judgment normal.         Assessment:       1. Acute midline low back pain without sciatica    2. Tension-type headache, not intractable, unspecified chronicity pattern    3. Essential hypertension    4. ESRD on dialysis        Plan:       Acute midline low back pain without sciatica  -     lidocaine (LIDODERM) 5 %; Place 1 patch onto the skin once daily. Remove & Discard patch within 12 hours or as directed by MD  Dispense: 15 patch; Refill: 2    Tension-type headache, not intractable, unspecified chronicity pattern  -     butalbital-acetaminophen-caffeine -40 mg (FIORICET, ESGIC) -40 mg per tablet; Take 1 tablet by mouth 2 (two) times daily as needed for Pain.  Dispense: 30 tablet; Refill: 1    Essential hypertension  Comments:  Blood pressure is fairly stable patient was given metoprolol and hydralazine by cardiologist but has not taking any of them yet will take 1 at a time and monito    ESRD on dialysis        Medication List with Changes/Refills   New Medications    LIDOCAINE (LIDODERM) 5 %    Place 1 patch onto the skin once daily. Remove & Discard patch within 12 hours or as directed by MD   Current Medications    ALBUTEROL (PROVENTIL/VENTOLIN HFA) 90 MCG/ACTUATION INHALER    Inhale 2 puffs into the lungs every 4 (four) hours as needed for Wheezing or Shortness of Breath. Rescue    ALBUTEROL-IPRATROPIUM (DUO-NEB) 2.5 MG-0.5 MG/3 ML NEBULIZER SOLUTION    TAKE 3 MLS BY NEBULIZATION EVERY 6 (SIX) HOURS WHILE AWAKE. RESCUE    AMLODIPINE (NORVASC) 10 MG TABLET    Take 10 mg by mouth once daily.    ANORO ELLIPTA  62.5-25 MCG/ACTUATION DSDV    INHALE 1 PUFF AS DIRECTED ONCE A DAY    ASPIRIN (ECOTRIN) 81 MG EC TABLET    Take 1 tablet (81 mg total) by mouth once daily. Hold prior to procedure    ATORVASTATIN (LIPITOR) 20 MG TABLET    Take 1 tablet (20 mg total) by mouth once daily.    CALCIUM CARBONATE (OS-UMAIR) 600 MG (1,500 MG) TAB    Take 600 mg by mouth once daily.      CLOPIDOGREL (PLAVIX) 75 MG TABLET    Take 1 tablet (75 mg total) by mouth once daily. Hold prior to procedure    CYPROHEPTADINE (PERIACTIN) 4 MG TABLET    Take 1 tablet (4 mg total) by mouth 2 (two) times daily.    FERRIC CITRATE (AURYXIA ORAL)    Take 2 tablets by mouth As instructed (2 tabs with meals and one tab with snacks per pt.). States 2 with each meal and one with snacks.    HYDRALAZINE (APRESOLINE) 25 MG TABLET    Take 1 tablet by mouth 2 (two) times daily.    METOPROLOL SUCCINATE (TOPROL-XL) 50 MG 24 HR TABLET    Take 1 tablet by mouth once daily.    PANTOPRAZOLE (PROTONIX) 40 MG TABLET    Take 1 tablet (40 mg total) by mouth 2 (two) times daily before meals.    PAROXETINE (PAXIL) 20 MG TABLET    TAKE 1 TABLET (20 MG TOTAL) BY MOUTH EVERY MORNING.    SUCRALFATE (CARAFATE) 1 GRAM TABLET    TAKE ONE TABLET BY MOUTH THREE TIMES DAILY BEFORE MEALS AND BEDTIME   Changed and/or Refilled Medications    Modified Medication Previous Medication    BUTALBITAL-ACETAMINOPHEN-CAFFEINE -40 MG (FIORICET, ESGIC) -40 MG PER TABLET butalbital-acetaminophen-caffeine -40 mg (FIORICET, ESGIC) -40 mg per tablet       Take 1 tablet by mouth 2 (two) times daily as needed for Pain.    Take 1 tablet by mouth 2 (two) times daily as needed for Pain.   Discontinued Medications    ACETAMINOPHEN (TYLENOL) 325 MG CAP    Take by mouth daily as needed (for back pain).

## 2020-12-02 ENCOUNTER — TELEPHONE (OUTPATIENT)
Dept: PRIMARY CARE CLINIC | Facility: CLINIC | Age: 70
End: 2020-12-02

## 2020-12-02 NOTE — TELEPHONE ENCOUNTER
----- Message from Candy Oconnor sent at 12/2/2020  3:10 PM CST -----  Contact: Patient, [phone]  Caller is requesting an earlier appt than we can schedule.  Caller declined first available appointment listed below. Caller will not accept being placed on the wait list and is requesting a message be sent to the provider.  When is the next available appointment:  January  Reason for the appointment:  Follow up [facility] 11/30/20 for pneumonia  Patient preference of timeframe to be scheduled:  Sooner  Comments:  Please call her. Thanks.

## 2020-12-04 ENCOUNTER — OFFICE VISIT (OUTPATIENT)
Dept: PRIMARY CARE CLINIC | Facility: CLINIC | Age: 70
End: 2020-12-04
Payer: MEDICARE

## 2020-12-04 DIAGNOSIS — J18.9 PNEUMONIA DUE TO INFECTIOUS ORGANISM, UNSPECIFIED LATERALITY, UNSPECIFIED PART OF LUNG: Primary | ICD-10-CM

## 2020-12-04 DIAGNOSIS — Z99.2 END-STAGE RENAL DISEASE ON HEMODIALYSIS: ICD-10-CM

## 2020-12-04 DIAGNOSIS — N18.6 END-STAGE RENAL DISEASE ON HEMODIALYSIS: ICD-10-CM

## 2020-12-04 DIAGNOSIS — S13.9XXA NECK SPRAIN, INITIAL ENCOUNTER: ICD-10-CM

## 2020-12-04 PROCEDURE — 1159F PR MEDICATION LIST DOCUMENTED IN MEDICAL RECORD: ICD-10-PCS | Mod: ,,, | Performed by: INTERNAL MEDICINE

## 2020-12-04 PROCEDURE — 99442 PR PHYSICIAN TELEPHONE EVALUATION 11-20 MIN: ICD-10-PCS | Mod: 95,,, | Performed by: INTERNAL MEDICINE

## 2020-12-04 PROCEDURE — 1159F MED LIST DOCD IN RCRD: CPT | Mod: ,,, | Performed by: INTERNAL MEDICINE

## 2020-12-04 PROCEDURE — 99442 PR PHYSICIAN TELEPHONE EVALUATION 11-20 MIN: CPT | Mod: 95,,, | Performed by: INTERNAL MEDICINE

## 2020-12-04 RX ORDER — TRAMADOL HYDROCHLORIDE 50 MG/1
50 TABLET ORAL EVERY 12 HOURS PRN
Qty: 20 TABLET | Refills: 0 | Status: SHIPPED | OUTPATIENT
Start: 2020-12-04 | End: 2021-02-18 | Stop reason: SDUPTHER

## 2020-12-04 RX ORDER — TIZANIDINE HYDROCHLORIDE 2 MG/1
CAPSULE, GELATIN COATED ORAL
Qty: 30 CAPSULE | Refills: 0 | Status: SHIPPED | OUTPATIENT
Start: 2020-12-04 | End: 2021-01-01 | Stop reason: CLARIF

## 2020-12-04 NOTE — PROGRESS NOTES
Subjective:    The patient location is: home  The chief complaint leading to consultation is: neck pain pneumonia    Visit type: audio only    Face to Face time with patient: 12   minutes of total time spent on the encounter, which includes face to face time and non-face to face time preparing to see the patient (eg, review of tests), Obtaining and/or reviewing separately obtained history, Documenting clinical information in the electronic or other health record, Independently interpreting results (not separately reported) and communicating results to the patient/family/caregiver, or Care coordination (not separately reported).         Each patient to whom he or she provides medical services by telemedicine is:  (1) informed of the relationship between the physician and patient and the respective role of any other health care provider with respect to management of the patient; and (2) notified that he or she may decline to receive medical services by telemedicine and may withdraw from such care at any time.    Notes:    Patient ID: Radha Jurado is a 70 y.o. female.    Chief Complaint: No chief complaint on file.    HPI  Pt visit today for f/u from hospital she had pneumonia treated with IV abx x 3 days in hospital now on po abx she has h/o CHF ESRD on dialyses COPD still smoking but not much. She states her pneumonia much better CRX RLL infiltrate she denies anymore fever sob cp her po diet is good BM normal denies HA . She also c/o her neck hurt on and off sometime sevre when she turn rt side she denies any trauma denies any weakness in ext   Review of Systems    Objective:      Physical Exam  Pt does not sound to be in any acute distress no sob cp does c/o neck pain when turn to rt  Assessment:       1. Pneumonia due to infectious organism, unspecified laterality, unspecified part of lung    2. Neck sprain, initial encounter    3. End-stage renal disease on hemodialysis        Plan:       Pneumonia due to  infectious organism, unspecified laterality, unspecified part of lung  Comments:  RLL clinically resolving repeat CXR in 1 month  Orders:  -     X-Ray Chest PA And Lateral; Future; Expected date: 12/05/2020    Neck sprain, initial encounter  -     tiZANidine 2 mg Cap; 1 po q 12 prn muscle spasm  Dispense: 30 capsule; Refill: 0  -     traMADoL (ULTRAM) 50 mg tablet; Take 1 tablet (50 mg total) by mouth every 12 (twelve) hours as needed for Pain.  Dispense: 20 tablet; Refill: 0  -     X-Ray Cervical Spine AP And Lateral; Future; Expected date: 12/05/2020    End-stage renal disease on hemodialysis  Comments:  continue dialyses 3x a week        Medication List with Changes/Refills   New Medications    TIZANIDINE 2 MG CAP    1 po q 12 prn muscle spasm    TRAMADOL (ULTRAM) 50 MG TABLET    Take 1 tablet (50 mg total) by mouth every 12 (twelve) hours as needed for Pain.   Current Medications    ALBUTEROL (PROVENTIL/VENTOLIN HFA) 90 MCG/ACTUATION INHALER    Inhale 2 puffs into the lungs every 4 (four) hours as needed for Wheezing or Shortness of Breath. Rescue    ALBUTEROL-IPRATROPIUM (DUO-NEB) 2.5 MG-0.5 MG/3 ML NEBULIZER SOLUTION    TAKE 3 MLS BY NEBULIZATION EVERY 6 (SIX) HOURS WHILE AWAKE. RESCUE    AMLODIPINE (NORVASC) 10 MG TABLET    Take 10 mg by mouth once daily.    ANORO ELLIPTA 62.5-25 MCG/ACTUATION DSDV    INHALE 1 PUFF AS DIRECTED ONCE A DAY    ASPIRIN (ECOTRIN) 81 MG EC TABLET    Take 1 tablet (81 mg total) by mouth once daily. Hold prior to procedure    ATORVASTATIN (LIPITOR) 20 MG TABLET    Take 1 tablet (20 mg total) by mouth once daily.    BUTALBITAL-ACETAMINOPHEN-CAFFEINE -40 MG (FIORICET, ESGIC) -40 MG PER TABLET    Take 1 tablet by mouth 2 (two) times daily as needed for Pain.    CALCIUM CARBONATE (OS-UMAIR) 600 MG (1,500 MG) TAB    Take 600 mg by mouth once daily.      CLOPIDOGREL (PLAVIX) 75 MG TABLET    Take 1 tablet (75 mg total) by mouth once daily. Hold prior to procedure    CYPROHEPTADINE  (PERIACTIN) 4 MG TABLET    Take 1 tablet (4 mg total) by mouth 2 (two) times daily.    DOXYCYCLINE (VIBRAMYCIN) 100 MG CAP    Take 1 capsule (100 mg total) by mouth 2 (two) times daily. for 7 days    FERRIC CITRATE (AURYXIA ORAL)    Take 2 tablets by mouth As instructed (2 tabs with meals and one tab with snacks per pt.). States 2 with each meal and one with snacks.    HYDRALAZINE (APRESOLINE) 25 MG TABLET    Take 1 tablet by mouth 2 (two) times daily.    LIDOCAINE (LIDODERM) 5 %    Place 1 patch onto the skin once daily. Remove & Discard patch within 12 hours or as directed by MD    METOPROLOL SUCCINATE (TOPROL-XL) 50 MG 24 HR TABLET    Take 1 tablet by mouth once daily.    PANTOPRAZOLE (PROTONIX) 40 MG TABLET    Take 1 tablet (40 mg total) by mouth 2 (two) times daily before meals.    PAROXETINE (PAXIL) 20 MG TABLET    TAKE 1 TABLET (20 MG TOTAL) BY MOUTH EVERY MORNING.    PREDNISONE (DELTASONE) 20 MG TABLET    Take 2 tablets (40 mg total) by mouth once daily. for 5 days    SUCRALFATE (CARAFATE) 1 GRAM TABLET    TAKE ONE TABLET BY MOUTH THREE TIMES DAILY BEFORE MEALS AND BEDTIME

## 2020-12-16 DIAGNOSIS — J18.9 PNEUMONIA DUE TO INFECTIOUS ORGANISM, UNSPECIFIED LATERALITY, UNSPECIFIED PART OF LUNG: Primary | ICD-10-CM

## 2020-12-18 ENCOUNTER — OFFICE VISIT (OUTPATIENT)
Dept: PRIMARY CARE CLINIC | Facility: CLINIC | Age: 70
End: 2020-12-18
Payer: MEDICARE

## 2020-12-18 VITALS
OXYGEN SATURATION: 97 % | HEIGHT: 61 IN | DIASTOLIC BLOOD PRESSURE: 56 MMHG | RESPIRATION RATE: 22 BRPM | WEIGHT: 104.25 LBS | TEMPERATURE: 98 F | HEART RATE: 100 BPM | BODY MASS INDEX: 19.68 KG/M2 | SYSTOLIC BLOOD PRESSURE: 122 MMHG

## 2020-12-18 DIAGNOSIS — R10.13 DYSPEPSIA: ICD-10-CM

## 2020-12-18 DIAGNOSIS — Z23 ENCOUNTER FOR VACCINATION: ICD-10-CM

## 2020-12-18 DIAGNOSIS — J44.9 CHRONIC OBSTRUCTIVE PULMONARY DISEASE, UNSPECIFIED COPD TYPE: ICD-10-CM

## 2020-12-18 DIAGNOSIS — G44.209 TENSION-TYPE HEADACHE, NOT INTRACTABLE, UNSPECIFIED CHRONICITY PATTERN: ICD-10-CM

## 2020-12-18 DIAGNOSIS — J18.9 PNEUMONIA DUE TO INFECTIOUS ORGANISM, UNSPECIFIED LATERALITY, UNSPECIFIED PART OF LUNG: Primary | ICD-10-CM

## 2020-12-18 DIAGNOSIS — N18.6 END-STAGE RENAL DISEASE ON HEMODIALYSIS: ICD-10-CM

## 2020-12-18 DIAGNOSIS — Z99.2 END-STAGE RENAL DISEASE ON HEMODIALYSIS: ICD-10-CM

## 2020-12-18 DIAGNOSIS — I50.9 CHRONIC CONGESTIVE HEART FAILURE, UNSPECIFIED HEART FAILURE TYPE: ICD-10-CM

## 2020-12-18 DIAGNOSIS — R11.2 NON-INTRACTABLE VOMITING WITH NAUSEA, UNSPECIFIED VOMITING TYPE: ICD-10-CM

## 2020-12-18 DIAGNOSIS — Z11.59 NEED FOR HEPATITIS C SCREENING TEST: ICD-10-CM

## 2020-12-18 PROCEDURE — 3008F BODY MASS INDEX DOCD: CPT | Mod: S$GLB,,, | Performed by: INTERNAL MEDICINE

## 2020-12-18 PROCEDURE — 1101F PT FALLS ASSESS-DOCD LE1/YR: CPT | Mod: S$GLB,,, | Performed by: INTERNAL MEDICINE

## 2020-12-18 PROCEDURE — 99999 PR PBB SHADOW E&M-EST. PATIENT-LVL V: ICD-10-PCS | Mod: PBBFAC,,, | Performed by: INTERNAL MEDICINE

## 2020-12-18 PROCEDURE — G0009 PNEUMOCOCCAL POLYSACCHARIDE VACCINE 23-VALENT =>2YO SQ IM: ICD-10-PCS | Mod: 59,S$GLB,, | Performed by: INTERNAL MEDICINE

## 2020-12-18 PROCEDURE — 1101F PR PT FALLS ASSESS DOC 0-1 FALLS W/OUT INJ PAST YR: ICD-10-PCS | Mod: S$GLB,,, | Performed by: INTERNAL MEDICINE

## 2020-12-18 PROCEDURE — 99213 OFFICE O/P EST LOW 20 MIN: CPT | Mod: 25,S$GLB,, | Performed by: INTERNAL MEDICINE

## 2020-12-18 PROCEDURE — G0009 ADMIN PNEUMOCOCCAL VACCINE: HCPCS | Mod: 59,S$GLB,, | Performed by: INTERNAL MEDICINE

## 2020-12-18 PROCEDURE — 99999 PR PBB SHADOW E&M-EST. PATIENT-LVL V: CPT | Mod: PBBFAC,,, | Performed by: INTERNAL MEDICINE

## 2020-12-18 PROCEDURE — 1125F AMNT PAIN NOTED PAIN PRSNT: CPT | Mod: S$GLB,,, | Performed by: INTERNAL MEDICINE

## 2020-12-18 PROCEDURE — 3288F PR FALLS RISK ASSESSMENT DOCUMENTED: ICD-10-PCS | Mod: S$GLB,,, | Performed by: INTERNAL MEDICINE

## 2020-12-18 PROCEDURE — 1159F MED LIST DOCD IN RCRD: CPT | Mod: S$GLB,,, | Performed by: INTERNAL MEDICINE

## 2020-12-18 PROCEDURE — 96372 PR INJECTION,THERAP/PROPH/DIAG2ST, IM OR SUBCUT: ICD-10-PCS | Mod: S$GLB,,, | Performed by: INTERNAL MEDICINE

## 2020-12-18 PROCEDURE — 90732 PPSV23 VACC 2 YRS+ SUBQ/IM: CPT | Mod: S$GLB,,, | Performed by: INTERNAL MEDICINE

## 2020-12-18 PROCEDURE — 99213 PR OFFICE/OUTPT VISIT, EST, LEVL III, 20-29 MIN: ICD-10-PCS | Mod: 25,S$GLB,, | Performed by: INTERNAL MEDICINE

## 2020-12-18 PROCEDURE — 3288F FALL RISK ASSESSMENT DOCD: CPT | Mod: S$GLB,,, | Performed by: INTERNAL MEDICINE

## 2020-12-18 PROCEDURE — 1159F PR MEDICATION LIST DOCUMENTED IN MEDICAL RECORD: ICD-10-PCS | Mod: S$GLB,,, | Performed by: INTERNAL MEDICINE

## 2020-12-18 PROCEDURE — 3008F PR BODY MASS INDEX (BMI) DOCUMENTED: ICD-10-PCS | Mod: S$GLB,,, | Performed by: INTERNAL MEDICINE

## 2020-12-18 PROCEDURE — 96372 THER/PROPH/DIAG INJ SC/IM: CPT | Mod: S$GLB,,, | Performed by: INTERNAL MEDICINE

## 2020-12-18 PROCEDURE — 1125F PR PAIN SEVERITY QUANTIFIED, PAIN PRESENT: ICD-10-PCS | Mod: S$GLB,,, | Performed by: INTERNAL MEDICINE

## 2020-12-18 PROCEDURE — 90732 PNEUMOCOCCAL POLYSACCHARIDE VACCINE 23-VALENT =>2YO SQ IM: ICD-10-PCS | Mod: S$GLB,,, | Performed by: INTERNAL MEDICINE

## 2020-12-18 RX ORDER — BUTALBITAL, ACETAMINOPHEN AND CAFFEINE 50; 325; 40 MG/1; MG/1; MG/1
1 TABLET ORAL 2 TIMES DAILY PRN
Qty: 30 TABLET | Refills: 1 | Status: SHIPPED | OUTPATIENT
Start: 2020-12-18 | End: 2021-02-22 | Stop reason: SDUPTHER

## 2020-12-18 RX ORDER — ZOSTER VACCINE RECOMBINANT, ADJUVANTED 50 MCG/0.5
0.5 KIT INTRAMUSCULAR ONCE
Qty: 1 EACH | Refills: 0 | Status: SHIPPED | OUTPATIENT
Start: 2020-12-18 | End: 2020-12-18

## 2020-12-18 RX ORDER — LEVOFLOXACIN 250 MG/1
250 TABLET ORAL DAILY
Qty: 10 TABLET | Refills: 0 | Status: SHIPPED | OUTPATIENT
Start: 2020-12-18 | End: 2020-12-28

## 2020-12-18 RX ORDER — FERRIC CITRATE 210 MG/1
1 TABLET, COATED ORAL DAILY
COMMUNITY
Start: 2020-12-09 | End: 2021-01-01 | Stop reason: CLARIF

## 2020-12-18 NOTE — PROGRESS NOTES
Verified pt ID using name and . Allergies verified with pt. Administered Rocephin 1 gm IM in Right VG, and Pneumo 23 IM in Right deltoid per physician order using aseptic technique. Aspirated and no blood return noted. Pt tolerated well with no adverse reactions noted.

## 2020-12-18 NOTE — PROGRESS NOTES
Subjective:       Patient ID: Radha Jurado is a 70 y.o. female.    Chief Complaint: Follow-up (Pneumonia ), Shortness of Breath, and GI Problem (Nausea & Vomitting )    HPI  Pt vsist for f/u penumonia no further refer coughing resolving still with some sob with exertion and coughing up white phlegm and upset stomach with nausea CXR improving resolving pneumonia RLL pt's neck pain better CS xray ddd C5-6 and C6-7   Review of Systems    Objective:      Physical Exam  Vitals signs and nursing note reviewed.   Constitutional:       General: She is not in acute distress.     Appearance: She is well-developed.   HENT:      Head: Normocephalic and atraumatic.      Right Ear: External ear normal.      Left Ear: External ear normal.      Nose: Nose normal.      Mouth/Throat:      Pharynx: No oropharyngeal exudate.   Eyes:      General:         Right eye: No discharge.         Left eye: No discharge.      Conjunctiva/sclera: Conjunctivae normal.      Pupils: Pupils are equal, round, and reactive to light.   Neck:      Musculoskeletal: Normal range of motion and neck supple.      Thyroid: No thyromegaly.   Cardiovascular:      Rate and Rhythm: Normal rate and regular rhythm.      Heart sounds: Normal heart sounds. No murmur. No friction rub. No gallop.    Pulmonary:      Effort: Pulmonary effort is normal. No respiratory distress.      Breath sounds: Normal breath sounds. No wheezing or rales.   Chest:      Chest wall: No tenderness.   Abdominal:      General: Bowel sounds are normal. There is no distension.      Palpations: Abdomen is soft.      Tenderness: There is no abdominal tenderness. There is no guarding or rebound.   Musculoskeletal: Normal range of motion.         General: No tenderness or deformity.   Lymphadenopathy:      Cervical: No cervical adenopathy.   Skin:     General: Skin is warm and dry.      Capillary Refill: Capillary refill takes less than 2 seconds.      Findings: No erythema or rash.    Neurological:      General: No focal deficit present.      Mental Status: She is alert and oriented to person, place, and time.   Psychiatric:         Thought Content: Thought content normal.         Judgment: Judgment normal.         Assessment:       1. Pneumonia due to infectious organism, unspecified laterality, unspecified part of lung    2. Tension-type headache, not intractable, unspecified chronicity pattern    3. End-stage renal disease on hemodialysis    4. Chronic obstructive pulmonary disease, unspecified COPD type    5. Encounter for vaccination    6. Chronic congestive heart failure, unspecified heart failure type    7. Need for hepatitis C screening test    8. Non-intractable vomiting with nausea, unspecified vomiting type    9. Dyspepsia        Plan:       Pneumonia due to infectious organism, unspecified laterality, unspecified part of lung  -     X-Ray Chest PA And Lateral; Future; Expected date: 12/18/2020  -     cefTRIAXone (ROCEPHIN) 1 g in lidocaine HCL 10 mg/ml (1%) IM only syringe  -     CBC Auto Differential; Future; Expected date: 12/18/2020  -     X-Ray Chest PA And Lateral; Future; Expected date: 12/18/2020  -     levoFLOXacin (LEVAQUIN) 250 MG tablet; Take 1 tablet (250 mg total) by mouth once daily. for 10 days  Dispense: 10 tablet; Refill: 0    Tension-type headache, not intractable, unspecified chronicity pattern  -     butalbital-acetaminophen-caffeine -40 mg (FIORICET, ESGIC) -40 mg per tablet; Take 1 tablet by mouth 2 (two) times daily as needed for Pain.  Dispense: 30 tablet; Refill: 1    End-stage renal disease on hemodialysis  -     Comprehensive Metabolic Panel; Future; Expected date: 12/18/2020    Chronic obstructive pulmonary disease, unspecified COPD type  -     X-Ray Chest PA And Lateral; Future; Expected date: 12/18/2020    Encounter for vaccination  -     (In Office Administered) Pneumococcal Polysaccharide Vaccine (23 Valent) (SQ/IM)  -     varicella-zoster  [id number], PF, (SHINGRIX, PF,) 50 mcg/0.5 mL injection; Inject 0.5 mLs into the muscle once. for 1 dose  Dispense: 1 each; Refill: 0    Chronic congestive heart failure, unspecified heart failure type  -     BNP; Future; Expected date: 12/18/2020    Need for hepatitis C screening test  -     Hepatitis C Antibody; Future; Expected date: 12/18/2020    Non-intractable vomiting with nausea, unspecified vomiting type  -     Lipase; Future; Expected date: 12/18/2020    Dyspepsia  -     Lipase; Future; Expected date: 12/18/2020        Medication List with Changes/Refills   New Medications    LEVOFLOXACIN (LEVAQUIN) 250 MG TABLET    Take 1 tablet (250 mg total) by mouth once daily. for 10 days    VARICELLA-ZOSTER [id number], PF, (SHINGRIX, PF,) 50 MCG/0.5 ML INJECTION    Inject 0.5 mLs into the muscle once. for 1 dose   Current Medications    ALBUTEROL (PROVENTIL/VENTOLIN HFA) 90 MCG/ACTUATION INHALER    Inhale 2 puffs into the lungs every 4 (four) hours as needed for Wheezing or Shortness of Breath. Rescue    ALBUTEROL-IPRATROPIUM (DUO-NEB) 2.5 MG-0.5 MG/3 ML NEBULIZER SOLUTION    TAKE 3 MLS BY NEBULIZATION EVERY 6 (SIX) HOURS WHILE AWAKE. RESCUE    AMLODIPINE (NORVASC) 10 MG TABLET    Take 10 mg by mouth once daily.    ANORO ELLIPTA 62.5-25 MCG/ACTUATION DSDV    INHALE 1 PUFF AS DIRECTED ONCE A DAY    ASPIRIN (ECOTRIN) 81 MG EC TABLET    Take 1 tablet (81 mg total) by mouth once daily. Hold prior to procedure    ATORVASTATIN (LIPITOR) 20 MG TABLET    Take 1 tablet (20 mg total) by mouth once daily.    AURYXIA 210 MG IRON TAB    Take 1 tablet by mouth once daily.    CALCIUM CARBONATE (OS-UMAIR) 600 MG (1,500 MG) TAB    Take 600 mg by mouth once daily.      CLOPIDOGREL (PLAVIX) 75 MG TABLET    Take 1 tablet (75 mg total) by mouth once daily. Hold prior to procedure    CYPROHEPTADINE (PERIACTIN) 4 MG TABLET    Take 1 tablet (4 mg total) by mouth 2 (two) times daily.    FERRIC CITRATE (AURYXIA ORAL)    Take 2 tablets by mouth As  instructed (2 tabs with meals and one tab with snacks per pt.). States 2 with each meal and one with snacks.    HYDRALAZINE (APRESOLINE) 25 MG TABLET    Take 1 tablet by mouth 2 (two) times daily.    LIDOCAINE (LIDODERM) 5 %    Place 1 patch onto the skin once daily. Remove & Discard patch within 12 hours or as directed by MD    METOPROLOL SUCCINATE (TOPROL-XL) 50 MG 24 HR TABLET    Take 1 tablet by mouth once daily.    PANTOPRAZOLE (PROTONIX) 40 MG TABLET    Take 1 tablet (40 mg total) by mouth 2 (two) times daily before meals.    PAROXETINE (PAXIL) 20 MG TABLET    TAKE 1 TABLET (20 MG TOTAL) BY MOUTH EVERY MORNING.    SUCRALFATE (CARAFATE) 1 GRAM TABLET    TAKE ONE TABLET BY MOUTH THREE TIMES DAILY BEFORE MEALS AND BEDTIME    TIZANIDINE 2 MG CAP    1 po q 12 prn muscle spasm    TRAMADOL (ULTRAM) 50 MG TABLET    Take 1 tablet (50 mg total) by mouth every 12 (twelve) hours as needed for Pain.   Changed and/or Refilled Medications    Modified Medication Previous Medication    BUTALBITAL-ACETAMINOPHEN-CAFFEINE -40 MG (FIORICET, ESGIC) -40 MG PER TABLET butalbital-acetaminophen-caffeine -40 mg (FIORICET, ESGIC) -40 mg per tablet       Take 1 tablet by mouth 2 (two) times daily as needed for Pain.    Take 1 tablet by mouth 2 (two) times daily as needed for Pain.

## 2020-12-22 DIAGNOSIS — R76.8 HEPATITIS C ANTIBODY POSITIVE IN BLOOD: Primary | ICD-10-CM

## 2021-01-01 ENCOUNTER — TELEPHONE (OUTPATIENT)
Dept: PRIMARY CARE CLINIC | Facility: CLINIC | Age: 71
End: 2021-01-01

## 2021-01-01 ENCOUNTER — PATIENT OUTREACH (OUTPATIENT)
Dept: ADMINISTRATIVE | Facility: OTHER | Age: 71
End: 2021-01-01
Payer: MEDICARE

## 2021-01-01 ENCOUNTER — TELEPHONE (OUTPATIENT)
Dept: PRIMARY CARE CLINIC | Facility: CLINIC | Age: 71
End: 2021-01-01
Payer: MEDICARE

## 2021-01-01 ENCOUNTER — HOSPITAL ENCOUNTER (OUTPATIENT)
Dept: RADIOLOGY | Facility: HOSPITAL | Age: 71
Discharge: HOME | DRG: 037 | End: 2021-05-31
Attending: THORACIC SURGERY (CARDIOTHORACIC VASCULAR SURGERY)
Payer: MEDICARE

## 2021-01-01 ENCOUNTER — TELEPHONE (OUTPATIENT)
Dept: INTERVENTIONAL RADIOLOGY/VASCULAR | Facility: HOSPITAL | Age: 71
End: 2021-01-01
Payer: MEDICARE

## 2021-01-01 ENCOUNTER — HOSPITAL ENCOUNTER (INPATIENT)
Facility: HOSPITAL | Age: 71
LOS: 2 days | Discharge: HOME | DRG: 291 | End: 2021-12-15
Attending: EMERGENCY MEDICINE | Admitting: INTERNAL MEDICINE
Payer: MEDICARE

## 2021-01-01 ENCOUNTER — HOSPITAL ENCOUNTER (INPATIENT)
Facility: HOSPITAL | Age: 71
LOS: 4 days | Discharge: HOME HEALTH | DRG: 981 | End: 2022-01-02
Attending: EMERGENCY MEDICINE | Admitting: INTERNAL MEDICINE
Payer: MEDICARE

## 2021-01-01 ENCOUNTER — PATIENT OUTREACH (OUTPATIENT)
Dept: ADMINISTRATIVE | Facility: CLINIC | Age: 71
End: 2021-01-01
Payer: MEDICARE

## 2021-01-01 ENCOUNTER — OFFICE VISIT (OUTPATIENT)
Dept: VASCULAR SURGERY | Facility: CLINIC | Age: 71
End: 2021-01-01
Payer: MEDICARE

## 2021-01-01 ENCOUNTER — OFFICE VISIT (OUTPATIENT)
Dept: PULMONOLOGY | Facility: CLINIC | Age: 71
End: 2021-01-01
Payer: MEDICARE

## 2021-01-01 ENCOUNTER — TELEPHONE (OUTPATIENT)
Dept: PULMONOLOGY | Facility: CLINIC | Age: 71
End: 2021-01-01
Payer: MEDICARE

## 2021-01-01 ENCOUNTER — ANESTHESIA EVENT (OUTPATIENT)
Dept: SURGERY | Facility: HOSPITAL | Age: 71
DRG: 981 | End: 2021-01-01
Payer: MEDICARE

## 2021-01-01 ENCOUNTER — OFFICE VISIT (OUTPATIENT)
Dept: PRIMARY CARE CLINIC | Facility: CLINIC | Age: 71
End: 2021-01-01
Payer: MEDICARE

## 2021-01-01 ENCOUNTER — ANESTHESIA EVENT (OUTPATIENT)
Dept: SURGERY | Facility: HOSPITAL | Age: 71
DRG: 037 | End: 2021-01-01
Payer: MEDICARE

## 2021-01-01 ENCOUNTER — ANESTHESIA (OUTPATIENT)
Dept: SURGERY | Facility: HOSPITAL | Age: 71
DRG: 981 | End: 2021-01-01
Payer: MEDICARE

## 2021-01-01 ENCOUNTER — TELEPHONE (OUTPATIENT)
Dept: PULMONOLOGY | Facility: CLINIC | Age: 71
End: 2021-01-01

## 2021-01-01 ENCOUNTER — DOCUMENTATION ONLY (OUTPATIENT)
Dept: HEPATOLOGY | Facility: CLINIC | Age: 71
End: 2021-01-01
Payer: MEDICARE

## 2021-01-01 ENCOUNTER — ANESTHESIA (OUTPATIENT)
Dept: SURGERY | Facility: HOSPITAL | Age: 71
DRG: 037 | End: 2021-01-01
Payer: MEDICARE

## 2021-01-01 ENCOUNTER — HOSPITAL ENCOUNTER (INPATIENT)
Facility: HOSPITAL | Age: 71
LOS: 2 days | Discharge: HOME | DRG: 037 | End: 2021-06-03
Attending: THORACIC SURGERY (CARDIOTHORACIC VASCULAR SURGERY) | Admitting: THORACIC SURGERY (CARDIOTHORACIC VASCULAR SURGERY)
Payer: MEDICARE

## 2021-01-01 ENCOUNTER — TELEPHONE (OUTPATIENT)
Dept: MEDSURG UNIT | Facility: HOSPITAL | Age: 71
End: 2021-01-01
Payer: MEDICARE

## 2021-01-01 ENCOUNTER — OUTSIDE PLACE OF SERVICE (OUTPATIENT)
Dept: PULMONOLOGY | Facility: CLINIC | Age: 71
End: 2021-01-01
Payer: MEDICARE

## 2021-01-01 ENCOUNTER — NURSE TRIAGE (OUTPATIENT)
Dept: ADMINISTRATIVE | Facility: CLINIC | Age: 71
End: 2021-01-01

## 2021-01-01 ENCOUNTER — PATIENT OUTREACH (OUTPATIENT)
Dept: ADMINISTRATIVE | Facility: OTHER | Age: 71
End: 2021-01-01

## 2021-01-01 ENCOUNTER — HOSPITAL ENCOUNTER (OUTPATIENT)
Dept: PREADMISSION TESTING | Facility: HOSPITAL | Age: 71
Discharge: HOME | DRG: 037 | End: 2021-05-31
Attending: THORACIC SURGERY (CARDIOTHORACIC VASCULAR SURGERY)
Payer: MEDICARE

## 2021-01-01 VITALS
TEMPERATURE: 99 F | HEIGHT: 61 IN | DIASTOLIC BLOOD PRESSURE: 64 MMHG | BODY MASS INDEX: 18.31 KG/M2 | SYSTOLIC BLOOD PRESSURE: 122 MMHG | OXYGEN SATURATION: 95 % | RESPIRATION RATE: 16 BRPM | HEART RATE: 82 BPM | WEIGHT: 97 LBS

## 2021-01-01 VITALS
DIASTOLIC BLOOD PRESSURE: 50 MMHG | HEART RATE: 68 BPM | WEIGHT: 97.69 LBS | HEIGHT: 60 IN | BODY MASS INDEX: 19.18 KG/M2 | DIASTOLIC BLOOD PRESSURE: 67 MMHG | SYSTOLIC BLOOD PRESSURE: 171 MMHG | HEIGHT: 61 IN | HEART RATE: 59 BPM | RESPIRATION RATE: 18 BRPM | OXYGEN SATURATION: 97 % | WEIGHT: 103 LBS | BODY MASS INDEX: 19.45 KG/M2 | SYSTOLIC BLOOD PRESSURE: 101 MMHG | TEMPERATURE: 98 F

## 2021-01-01 VITALS
HEIGHT: 61 IN | WEIGHT: 88.19 LBS | OXYGEN SATURATION: 94 % | DIASTOLIC BLOOD PRESSURE: 50 MMHG | BODY MASS INDEX: 16.65 KG/M2 | SYSTOLIC BLOOD PRESSURE: 108 MMHG | RESPIRATION RATE: 16 BRPM | HEART RATE: 108 BPM

## 2021-01-01 VITALS
OXYGEN SATURATION: 96 % | HEART RATE: 62 BPM | HEIGHT: 61 IN | BODY MASS INDEX: 17.75 KG/M2 | DIASTOLIC BLOOD PRESSURE: 44 MMHG | RESPIRATION RATE: 24 BRPM | SYSTOLIC BLOOD PRESSURE: 124 MMHG | WEIGHT: 94 LBS

## 2021-01-01 VITALS
HEART RATE: 53 BPM | WEIGHT: 99.19 LBS | RESPIRATION RATE: 16 BRPM | BODY MASS INDEX: 19.47 KG/M2 | SYSTOLIC BLOOD PRESSURE: 108 MMHG | TEMPERATURE: 98 F | HEIGHT: 60 IN | DIASTOLIC BLOOD PRESSURE: 62 MMHG | OXYGEN SATURATION: 97 %

## 2021-01-01 VITALS
TEMPERATURE: 96 F | SYSTOLIC BLOOD PRESSURE: 163 MMHG | RESPIRATION RATE: 18 BRPM | HEART RATE: 81 BPM | WEIGHT: 97.25 LBS | OXYGEN SATURATION: 96 % | HEIGHT: 61 IN | BODY MASS INDEX: 18.36 KG/M2 | DIASTOLIC BLOOD PRESSURE: 71 MMHG

## 2021-01-01 VITALS
DIASTOLIC BLOOD PRESSURE: 68 MMHG | TEMPERATURE: 99 F | SYSTOLIC BLOOD PRESSURE: 150 MMHG | RESPIRATION RATE: 17 BRPM | WEIGHT: 103 LBS | HEART RATE: 77 BPM | HEIGHT: 61 IN | OXYGEN SATURATION: 100 % | BODY MASS INDEX: 19.45 KG/M2

## 2021-01-01 VITALS
BODY MASS INDEX: 19.28 KG/M2 | HEART RATE: 60 BPM | DIASTOLIC BLOOD PRESSURE: 48 MMHG | RESPIRATION RATE: 18 BRPM | TEMPERATURE: 98 F | WEIGHT: 98.19 LBS | HEIGHT: 60 IN | OXYGEN SATURATION: 98 % | SYSTOLIC BLOOD PRESSURE: 136 MMHG

## 2021-01-01 VITALS
BODY MASS INDEX: 17.82 KG/M2 | RESPIRATION RATE: 18 BRPM | WEIGHT: 94.38 LBS | HEART RATE: 56 BPM | HEIGHT: 61 IN | SYSTOLIC BLOOD PRESSURE: 122 MMHG | DIASTOLIC BLOOD PRESSURE: 46 MMHG | OXYGEN SATURATION: 98 %

## 2021-01-01 VITALS
SYSTOLIC BLOOD PRESSURE: 140 MMHG | WEIGHT: 94.56 LBS | DIASTOLIC BLOOD PRESSURE: 58 MMHG | OXYGEN SATURATION: 96 % | BODY MASS INDEX: 17.85 KG/M2 | HEART RATE: 61 BPM | RESPIRATION RATE: 16 BRPM | HEIGHT: 61 IN

## 2021-01-01 VITALS
SYSTOLIC BLOOD PRESSURE: 130 MMHG | HEIGHT: 61 IN | HEART RATE: 97 BPM | WEIGHT: 91.25 LBS | OXYGEN SATURATION: 97 % | DIASTOLIC BLOOD PRESSURE: 64 MMHG | BODY MASS INDEX: 17.23 KG/M2 | RESPIRATION RATE: 16 BRPM

## 2021-01-01 VITALS
DIASTOLIC BLOOD PRESSURE: 50 MMHG | HEART RATE: 74 BPM | BODY MASS INDEX: 17.61 KG/M2 | OXYGEN SATURATION: 99 % | WEIGHT: 93.25 LBS | HEIGHT: 61 IN | SYSTOLIC BLOOD PRESSURE: 128 MMHG | RESPIRATION RATE: 22 BRPM

## 2021-01-01 DIAGNOSIS — I65.23 BILATERAL CAROTID ARTERY OCCLUSION: Primary | ICD-10-CM

## 2021-01-01 DIAGNOSIS — R63.0 LOSS OF APPETITE: ICD-10-CM

## 2021-01-01 DIAGNOSIS — Z72.0 TOBACCO ABUSE: Primary | ICD-10-CM

## 2021-01-01 DIAGNOSIS — R06.02 SOB (SHORTNESS OF BREATH): Primary | ICD-10-CM

## 2021-01-01 DIAGNOSIS — Z12.31 ENCOUNTER FOR SCREENING MAMMOGRAM FOR BREAST CANCER: Primary | ICD-10-CM

## 2021-01-01 DIAGNOSIS — Z72.0 TOBACCO ABUSE: ICD-10-CM

## 2021-01-01 DIAGNOSIS — I65.23 BILATERAL CAROTID ARTERY OCCLUSION: ICD-10-CM

## 2021-01-01 DIAGNOSIS — R51.9 HEADACHE, UNSPECIFIED HEADACHE TYPE: ICD-10-CM

## 2021-01-01 DIAGNOSIS — R06.02 SHORTNESS OF BREATH: ICD-10-CM

## 2021-01-01 DIAGNOSIS — J44.9 CHRONIC OBSTRUCTIVE PULMONARY DISEASE, UNSPECIFIED COPD TYPE: ICD-10-CM

## 2021-01-01 DIAGNOSIS — J18.9 COMMUNITY ACQUIRED PNEUMONIA OF RIGHT LOWER LOBE OF LUNG: Primary | ICD-10-CM

## 2021-01-01 DIAGNOSIS — J90 PLEURAL EFFUSION ON RIGHT: ICD-10-CM

## 2021-01-01 DIAGNOSIS — J96.11 CHRONIC HYPOXEMIC RESPIRATORY FAILURE: ICD-10-CM

## 2021-01-01 DIAGNOSIS — H53.8 BLURRED VISION: ICD-10-CM

## 2021-01-01 DIAGNOSIS — J90 RECURRENT RIGHT PLEURAL EFFUSION: ICD-10-CM

## 2021-01-01 DIAGNOSIS — Z99.2 ESRD ON DIALYSIS: ICD-10-CM

## 2021-01-01 DIAGNOSIS — I65.21 STENOSIS OF RIGHT CAROTID ARTERY: ICD-10-CM

## 2021-01-01 DIAGNOSIS — J44.1 COPD EXACERBATION: ICD-10-CM

## 2021-01-01 DIAGNOSIS — T82.898D ARTERIOVENOUS FISTULA OCCLUSION, SUBSEQUENT ENCOUNTER: ICD-10-CM

## 2021-01-01 DIAGNOSIS — N18.6 ESRD ON DIALYSIS: ICD-10-CM

## 2021-01-01 DIAGNOSIS — I65.21 STENOSIS OF RIGHT CAROTID ARTERY: Primary | ICD-10-CM

## 2021-01-01 DIAGNOSIS — I27.20 PULMONARY HYPERTENSION: ICD-10-CM

## 2021-01-01 DIAGNOSIS — J44.1 COPD EXACERBATION: Primary | ICD-10-CM

## 2021-01-01 DIAGNOSIS — D64.9 ANEMIA, UNSPECIFIED TYPE: ICD-10-CM

## 2021-01-01 DIAGNOSIS — J18.9 PNEUMONIA DUE TO INFECTIOUS ORGANISM, UNSPECIFIED LATERALITY, UNSPECIFIED PART OF LUNG: Primary | ICD-10-CM

## 2021-01-01 DIAGNOSIS — I10 ESSENTIAL HYPERTENSION: ICD-10-CM

## 2021-01-01 DIAGNOSIS — N30.00 ACUTE CYSTITIS WITHOUT HEMATURIA: ICD-10-CM

## 2021-01-01 DIAGNOSIS — I65.21 OCCLUSION OF RIGHT CAROTID ARTERY: ICD-10-CM

## 2021-01-01 DIAGNOSIS — Z23 ENCOUNTER FOR VACCINATION: ICD-10-CM

## 2021-01-01 DIAGNOSIS — R53.1 WEAKNESS: ICD-10-CM

## 2021-01-01 DIAGNOSIS — J90 CHRONIC PLEURAL EFFUSION: ICD-10-CM

## 2021-01-01 DIAGNOSIS — I65.29 CAROTID ARTERY STENOSIS: ICD-10-CM

## 2021-01-01 DIAGNOSIS — J90 PLEURAL EFFUSION ON RIGHT: Primary | ICD-10-CM

## 2021-01-01 DIAGNOSIS — Z99.2 ESRD (END STAGE RENAL DISEASE) ON DIALYSIS: ICD-10-CM

## 2021-01-01 DIAGNOSIS — D64.9 ANEMIA, UNSPECIFIED TYPE: Primary | ICD-10-CM

## 2021-01-01 DIAGNOSIS — N18.6 ESRD (END STAGE RENAL DISEASE) ON DIALYSIS: ICD-10-CM

## 2021-01-01 DIAGNOSIS — J43.8 OTHER EMPHYSEMA: ICD-10-CM

## 2021-01-01 DIAGNOSIS — I65.21 OCCLUSION OF RIGHT CAROTID ARTERY: Primary | ICD-10-CM

## 2021-01-01 DIAGNOSIS — R53.1 GENERAL WEAKNESS: ICD-10-CM

## 2021-01-01 DIAGNOSIS — E87.70 HYPERVOLEMIA, UNSPECIFIED HYPERVOLEMIA TYPE: Primary | ICD-10-CM

## 2021-01-01 DIAGNOSIS — J90 PLEURAL EFFUSION: ICD-10-CM

## 2021-01-01 DIAGNOSIS — R06.02 SOB (SHORTNESS OF BREATH): ICD-10-CM

## 2021-01-01 DIAGNOSIS — R07.9 CHEST PAIN: Primary | ICD-10-CM

## 2021-01-01 DIAGNOSIS — R09.02 HYPOXEMIA: ICD-10-CM

## 2021-01-01 DIAGNOSIS — I25.10 CORONARY ARTERY DISEASE INVOLVING NATIVE CORONARY ARTERY OF NATIVE HEART WITHOUT ANGINA PECTORIS: ICD-10-CM

## 2021-01-01 DIAGNOSIS — M18.11 ARTHRITIS OF CARPOMETACARPAL (CMC) JOINT OF RIGHT THUMB: ICD-10-CM

## 2021-01-01 DIAGNOSIS — Z12.31 ENCOUNTER FOR SCREENING MAMMOGRAM FOR BREAST CANCER: ICD-10-CM

## 2021-01-01 DIAGNOSIS — Z13.220 ENCOUNTER FOR LIPID SCREENING FOR CARDIOVASCULAR DISEASE: ICD-10-CM

## 2021-01-01 DIAGNOSIS — I10 ESSENTIAL HYPERTENSION: Primary | ICD-10-CM

## 2021-01-01 DIAGNOSIS — Z23 NEED FOR VACCINATION: ICD-10-CM

## 2021-01-01 DIAGNOSIS — Z13.6 ENCOUNTER FOR LIPID SCREENING FOR CARDIOVASCULAR DISEASE: ICD-10-CM

## 2021-01-01 DIAGNOSIS — J40 BRONCHITIS: ICD-10-CM

## 2021-01-01 DIAGNOSIS — R11.0 NAUSEA: ICD-10-CM

## 2021-01-01 DIAGNOSIS — Z12.11 ENCOUNTER FOR SCREENING COLONOSCOPY: ICD-10-CM

## 2021-01-01 DIAGNOSIS — R51.9 HEADACHE, UNSPECIFIED HEADACHE TYPE: Primary | ICD-10-CM

## 2021-01-01 DIAGNOSIS — J98.11 ATELECTASIS, RIGHT: ICD-10-CM

## 2021-01-01 DIAGNOSIS — J18.9 PNEUMONIA DUE TO INFECTIOUS ORGANISM, UNSPECIFIED LATERALITY, UNSPECIFIED PART OF LUNG: ICD-10-CM

## 2021-01-01 DIAGNOSIS — R53.1 ASTHENIA: ICD-10-CM

## 2021-01-01 DIAGNOSIS — J18.9 COMMUNITY ACQUIRED PNEUMONIA OF RIGHT LOWER LOBE OF LUNG: ICD-10-CM

## 2021-01-01 DIAGNOSIS — I77.9 CAROTID ARTERY DISEASE, UNSPECIFIED LATERALITY, UNSPECIFIED TYPE: ICD-10-CM

## 2021-01-01 DIAGNOSIS — R42 DIZZINESS: Primary | ICD-10-CM

## 2021-01-01 DIAGNOSIS — R55 SYNCOPE, UNSPECIFIED SYNCOPE TYPE: ICD-10-CM

## 2021-01-01 LAB
ABO + RH BLD: NORMAL
ALBUMIN SERPL BCP-MCNC: 2.6 G/DL (ref 3.5–5.2)
ALBUMIN SERPL BCP-MCNC: 2.9 G/DL (ref 3.5–5.2)
ALBUMIN SERPL BCP-MCNC: 3.1 G/DL (ref 3.5–5.2)
ALBUMIN SERPL BCP-MCNC: 3.1 G/DL (ref 3.5–5.2)
ALP SERPL-CCNC: 117 U/L (ref 55–135)
ALP SERPL-CCNC: 134 U/L (ref 55–135)
ALP SERPL-CCNC: 96 U/L (ref 55–135)
ALT SERPL W/O P-5'-P-CCNC: 6 U/L (ref 10–44)
ALT SERPL W/O P-5'-P-CCNC: 7 U/L (ref 10–44)
ALT SERPL W/O P-5'-P-CCNC: 7 U/L (ref 10–44)
ANION GAP SERPL CALC-SCNC: 13 MMOL/L (ref 8–16)
ANION GAP SERPL CALC-SCNC: 13 MMOL/L (ref 8–16)
ANION GAP SERPL CALC-SCNC: 14 MMOL/L (ref 8–16)
ANION GAP SERPL CALC-SCNC: 15 MMOL/L (ref 8–16)
AST SERPL-CCNC: 11 U/L (ref 10–40)
AST SERPL-CCNC: 12 U/L (ref 10–40)
AST SERPL-CCNC: 16 U/L (ref 10–40)
BASOPHILS # BLD AUTO: 0.01 K/UL (ref 0–0.2)
BASOPHILS # BLD AUTO: 0.02 K/UL (ref 0–0.2)
BASOPHILS NFR BLD: 0.1 % (ref 0–1.9)
BASOPHILS NFR BLD: 0.3 % (ref 0–1.9)
BILIRUB SERPL-MCNC: 0.3 MG/DL (ref 0.1–1)
BILIRUB SERPL-MCNC: 0.3 MG/DL (ref 0.1–1)
BILIRUB SERPL-MCNC: 0.6 MG/DL (ref 0.1–1)
BLD GP AB SCN CELLS X3 SERPL QL: NORMAL
BNP SERPL-MCNC: 3832 PG/ML (ref 0–99)
BUN SERPL-MCNC: 29 MG/DL (ref 8–23)
BUN SERPL-MCNC: 35 MG/DL (ref 8–23)
BUN SERPL-MCNC: 42 MG/DL (ref 8–23)
BUN SERPL-MCNC: 43 MG/DL (ref 8–23)
BUN SERPL-MCNC: 43 MG/DL (ref 8–23)
BUN SERPL-MCNC: 56 MG/DL (ref 8–23)
BUN SERPL-MCNC: 69 MG/DL (ref 8–23)
CALCIUM SERPL-MCNC: 10 MG/DL (ref 8.7–10.5)
CALCIUM SERPL-MCNC: 10.3 MG/DL (ref 8.7–10.5)
CALCIUM SERPL-MCNC: 8 MG/DL (ref 8.7–10.5)
CALCIUM SERPL-MCNC: 8.2 MG/DL (ref 8.7–10.5)
CALCIUM SERPL-MCNC: 8.3 MG/DL (ref 8.7–10.5)
CALCIUM SERPL-MCNC: 8.7 MG/DL (ref 8.7–10.5)
CALCIUM SERPL-MCNC: 8.8 MG/DL (ref 8.7–10.5)
CHLORIDE SERPL-SCNC: 101 MMOL/L (ref 95–110)
CHLORIDE SERPL-SCNC: 102 MMOL/L (ref 95–110)
CHLORIDE SERPL-SCNC: 107 MMOL/L (ref 95–110)
CHLORIDE SERPL-SCNC: 112 MMOL/L (ref 95–110)
CHLORIDE SERPL-SCNC: 112 MMOL/L (ref 95–110)
CHLORIDE SERPL-SCNC: 94 MMOL/L (ref 95–110)
CHLORIDE SERPL-SCNC: 99 MMOL/L (ref 95–110)
CO2 SERPL-SCNC: 12 MMOL/L (ref 23–29)
CO2 SERPL-SCNC: 13 MMOL/L (ref 23–29)
CO2 SERPL-SCNC: 22 MMOL/L (ref 23–29)
CO2 SERPL-SCNC: 22 MMOL/L (ref 23–29)
CO2 SERPL-SCNC: 23 MMOL/L (ref 23–29)
CO2 SERPL-SCNC: 25 MMOL/L (ref 23–29)
CO2 SERPL-SCNC: 26 MMOL/L (ref 23–29)
CREAT SERPL-MCNC: 2.2 MG/DL (ref 0.5–1.4)
CREAT SERPL-MCNC: 3.2 MG/DL (ref 0.5–1.4)
CREAT SERPL-MCNC: 3.3 MG/DL (ref 0.5–1.4)
CREAT SERPL-MCNC: 3.5 MG/DL (ref 0.5–1.4)
CREAT SERPL-MCNC: 3.6 MG/DL (ref 0.5–1.4)
CREAT SERPL-MCNC: 4.5 MG/DL (ref 0.5–1.4)
CREAT SERPL-MCNC: 4.6 MG/DL (ref 0.5–1.4)
DIFFERENTIAL METHOD: ABNORMAL
EOSINOPHIL # BLD AUTO: 0 K/UL (ref 0–0.5)
EOSINOPHIL # BLD AUTO: 0.1 K/UL (ref 0–0.5)
EOSINOPHIL # BLD AUTO: 0.1 K/UL (ref 0–0.5)
EOSINOPHIL # BLD AUTO: 0.2 K/UL (ref 0–0.5)
EOSINOPHIL NFR BLD: 0.4 % (ref 0–8)
EOSINOPHIL NFR BLD: 1 % (ref 0–8)
EOSINOPHIL NFR BLD: 1.4 % (ref 0–8)
EOSINOPHIL NFR BLD: 3.2 % (ref 0–8)
ERYTHROCYTE [DISTWIDTH] IN BLOOD BY AUTOMATED COUNT: 16.2 % (ref 11.5–14.5)
ERYTHROCYTE [DISTWIDTH] IN BLOOD BY AUTOMATED COUNT: 16.4 % (ref 11.5–14.5)
ERYTHROCYTE [DISTWIDTH] IN BLOOD BY AUTOMATED COUNT: 18.6 % (ref 11.5–14.5)
ERYTHROCYTE [DISTWIDTH] IN BLOOD BY AUTOMATED COUNT: 20.6 % (ref 11.5–14.5)
ERYTHROCYTE [DISTWIDTH] IN BLOOD BY AUTOMATED COUNT: 21 % (ref 11.5–14.5)
EST. GFR  (AFRICAN AMERICAN): 10 ML/MIN/1.73 M^2
EST. GFR  (AFRICAN AMERICAN): 11 ML/MIN/1.73 M^2
EST. GFR  (AFRICAN AMERICAN): 14 ML/MIN/1.73 M^2
EST. GFR  (AFRICAN AMERICAN): 14.3 ML/MIN/1.73 M^2
EST. GFR  (AFRICAN AMERICAN): 15.4 ML/MIN/1.73 M^2
EST. GFR  (AFRICAN AMERICAN): 15.9 ML/MIN/1.73 M^2
EST. GFR  (AFRICAN AMERICAN): 25.1 ML/MIN/1.73 M^2
EST. GFR  (NON AFRICAN AMERICAN): 12 ML/MIN/1.73 M^2
EST. GFR  (NON AFRICAN AMERICAN): 12.4 ML/MIN/1.73 M^2
EST. GFR  (NON AFRICAN AMERICAN): 13.3 ML/MIN/1.73 M^2
EST. GFR  (NON AFRICAN AMERICAN): 13.8 ML/MIN/1.73 M^2
EST. GFR  (NON AFRICAN AMERICAN): 21.7 ML/MIN/1.73 M^2
EST. GFR  (NON AFRICAN AMERICAN): 9 ML/MIN/1.73 M^2
EST. GFR  (NON AFRICAN AMERICAN): 9 ML/MIN/1.73 M^2
FOLATE SERPL-MCNC: 5.1 NG/ML (ref 4–24)
GLUCOSE SERPL-MCNC: 100 MG/DL (ref 70–110)
GLUCOSE SERPL-MCNC: 101 MG/DL (ref 70–110)
GLUCOSE SERPL-MCNC: 162 MG/DL (ref 70–110)
GLUCOSE SERPL-MCNC: 78 MG/DL (ref 70–110)
GLUCOSE SERPL-MCNC: 81 MG/DL (ref 70–110)
GLUCOSE SERPL-MCNC: 86 MG/DL (ref 70–110)
GLUCOSE SERPL-MCNC: 94 MG/DL (ref 70–110)
HBV CORE AB SERPL QL IA: NEGATIVE
HBV SURFACE AB SER-ACNC: NEGATIVE M[IU]/ML
HBV SURFACE AG SERPL QL IA: NEGATIVE
HCT VFR BLD AUTO: 31.1 % (ref 37–48.5)
HCT VFR BLD AUTO: 31.8 % (ref 37–48.5)
HCT VFR BLD AUTO: 32.5 % (ref 37–48.5)
HCT VFR BLD AUTO: 36.8 % (ref 37–48.5)
HCT VFR BLD AUTO: 37.5 % (ref 37–48.5)
HCT VFR BLD AUTO: 39 % (ref 37–48.5)
HGB BLD-MCNC: 11.5 G/DL (ref 12–16)
HGB BLD-MCNC: 11.9 G/DL (ref 12–16)
HGB BLD-MCNC: 12.1 G/DL (ref 12–16)
HGB BLD-MCNC: 9.2 G/DL (ref 12–16)
HGB BLD-MCNC: 9.4 G/DL (ref 12–16)
HGB BLD-MCNC: 9.4 G/DL (ref 12–16)
IMM GRANULOCYTES # BLD AUTO: 0.03 K/UL (ref 0–0.04)
IMM GRANULOCYTES # BLD AUTO: 0.04 K/UL (ref 0–0.04)
IMM GRANULOCYTES # BLD AUTO: 0.05 K/UL (ref 0–0.04)
IMM GRANULOCYTES # BLD AUTO: 0.05 K/UL (ref 0–0.04)
IMM GRANULOCYTES NFR BLD AUTO: 0.5 % (ref 0–0.5)
IMM GRANULOCYTES NFR BLD AUTO: 0.5 % (ref 0–0.5)
IMM GRANULOCYTES NFR BLD AUTO: 0.6 % (ref 0–0.5)
IMM GRANULOCYTES NFR BLD AUTO: 0.7 % (ref 0–0.5)
LYMPHOCYTES # BLD AUTO: 0.5 K/UL (ref 1–4.8)
LYMPHOCYTES # BLD AUTO: 0.5 K/UL (ref 1–4.8)
LYMPHOCYTES # BLD AUTO: 0.6 K/UL (ref 1–4.8)
LYMPHOCYTES # BLD AUTO: 0.7 K/UL (ref 1–4.8)
LYMPHOCYTES NFR BLD: 11.5 % (ref 18–48)
LYMPHOCYTES NFR BLD: 6.6 % (ref 18–48)
LYMPHOCYTES NFR BLD: 6.9 % (ref 18–48)
LYMPHOCYTES NFR BLD: 7.2 % (ref 18–48)
MAGNESIUM SERPL-MCNC: 2.5 MG/DL (ref 1.6–2.6)
MCH RBC QN AUTO: 28.8 PG (ref 27–31)
MCH RBC QN AUTO: 29.2 PG (ref 27–31)
MCH RBC QN AUTO: 30.5 PG (ref 27–31)
MCH RBC QN AUTO: 30.7 PG (ref 27–31)
MCH RBC QN AUTO: 31.8 PG (ref 27–31)
MCHC RBC AUTO-ENTMCNC: 28.9 G/DL (ref 32–36)
MCHC RBC AUTO-ENTMCNC: 29.6 G/DL (ref 32–36)
MCHC RBC AUTO-ENTMCNC: 29.6 G/DL (ref 32–36)
MCHC RBC AUTO-ENTMCNC: 30.7 G/DL (ref 32–36)
MCHC RBC AUTO-ENTMCNC: 32.3 G/DL (ref 32–36)
MCV RBC AUTO: 100 FL (ref 82–98)
MCV RBC AUTO: 100 FL (ref 82–98)
MCV RBC AUTO: 107 FL (ref 82–98)
MCV RBC AUTO: 94 FL (ref 82–98)
MCV RBC AUTO: 99 FL (ref 82–98)
MONOCYTES # BLD AUTO: 0.5 K/UL (ref 0.3–1)
MONOCYTES # BLD AUTO: 0.6 K/UL (ref 0.3–1)
MONOCYTES # BLD AUTO: 0.7 K/UL (ref 0.3–1)
MONOCYTES # BLD AUTO: 0.7 K/UL (ref 0.3–1)
MONOCYTES NFR BLD: 10.9 % (ref 4–15)
MONOCYTES NFR BLD: 7.5 % (ref 4–15)
MONOCYTES NFR BLD: 7.7 % (ref 4–15)
MONOCYTES NFR BLD: 9.1 % (ref 4–15)
NEUTROPHILS # BLD AUTO: 4.7 K/UL (ref 1.8–7.7)
NEUTROPHILS # BLD AUTO: 6.1 K/UL (ref 1.8–7.7)
NEUTROPHILS # BLD AUTO: 6.5 K/UL (ref 1.8–7.7)
NEUTROPHILS # BLD AUTO: 6.5 K/UL (ref 1.8–7.7)
NEUTROPHILS NFR BLD: 73.6 % (ref 38–73)
NEUTROPHILS NFR BLD: 82.9 % (ref 38–73)
NEUTROPHILS NFR BLD: 83.2 % (ref 38–73)
NEUTROPHILS NFR BLD: 83.6 % (ref 38–73)
NRBC BLD-RTO: 0 /100 WBC
NRBC BLD-RTO: 0 /100 WBC
NRBC BLD-RTO: 1 /100 WBC
NRBC BLD-RTO: 2 /100 WBC
PHOSPHATE SERPL-MCNC: 4 MG/DL (ref 2.7–4.5)
PHOSPHATE SERPL-MCNC: 4 MG/DL (ref 2.7–4.5)
PHOSPHATE SERPL-MCNC: 5.5 MG/DL (ref 2.7–4.5)
PLATELET # BLD AUTO: 108 K/UL (ref 150–450)
PLATELET # BLD AUTO: 163 K/UL (ref 150–450)
PLATELET # BLD AUTO: 240 K/UL (ref 150–450)
PLATELET # BLD AUTO: 306 K/UL (ref 150–450)
PLATELET # BLD AUTO: 426 K/UL (ref 150–450)
PMV BLD AUTO: 10.3 FL (ref 9.2–12.9)
PMV BLD AUTO: 10.8 FL (ref 9.2–12.9)
PMV BLD AUTO: 10.9 FL (ref 9.2–12.9)
PMV BLD AUTO: 11.4 FL (ref 9.2–12.9)
PMV BLD AUTO: 9.8 FL (ref 9.2–12.9)
POC ACTIVATED CLOTTING TIME K: 142 SEC (ref 74–137)
POC ACTIVATED CLOTTING TIME K: 142 SEC (ref 74–137)
POC ACTIVATED CLOTTING TIME K: 301 SEC (ref 74–137)
POCT GLUCOSE: 80 MG/DL (ref 70–110)
POCT GLUCOSE: 87 MG/DL (ref 70–110)
POCT GLUCOSE: 93 MG/DL (ref 70–110)
POTASSIUM SERPL-SCNC: 3 MMOL/L (ref 3.5–5.1)
POTASSIUM SERPL-SCNC: 3 MMOL/L (ref 3.5–5.1)
POTASSIUM SERPL-SCNC: 3.3 MMOL/L (ref 3.5–5.1)
POTASSIUM SERPL-SCNC: 3.4 MMOL/L (ref 3.5–5.1)
POTASSIUM SERPL-SCNC: 3.8 MMOL/L (ref 3.5–5.1)
POTASSIUM SERPL-SCNC: 4 MMOL/L (ref 3.5–5.1)
POTASSIUM SERPL-SCNC: 4.4 MMOL/L (ref 3.5–5.1)
PROT SERPL-MCNC: 6.4 G/DL (ref 6–8.4)
PROT SERPL-MCNC: 7.3 G/DL (ref 6–8.4)
PROT SERPL-MCNC: 7.6 G/DL (ref 6–8.4)
PTH-INTACT SERPL-MCNC: 364.4 PG/ML (ref 9–77)
RBC # BLD AUTO: 2.96 M/UL (ref 4–5.4)
RBC # BLD AUTO: 3.15 M/UL (ref 4–5.4)
RBC # BLD AUTO: 3.26 M/UL (ref 4–5.4)
RBC # BLD AUTO: 3.75 M/UL (ref 4–5.4)
RBC # BLD AUTO: 3.9 M/UL (ref 4–5.4)
SAMPLE: ABNORMAL
SARS-COV-2 RDRP RESP QL NAA+PROBE: NEGATIVE
SARS-COV-2 RDRP RESP QL NAA+PROBE: NEGATIVE
SODIUM SERPL-SCNC: 135 MMOL/L (ref 136–145)
SODIUM SERPL-SCNC: 137 MMOL/L (ref 136–145)
SODIUM SERPL-SCNC: 137 MMOL/L (ref 136–145)
SODIUM SERPL-SCNC: 139 MMOL/L (ref 136–145)
SODIUM SERPL-SCNC: 139 MMOL/L (ref 136–145)
SODIUM SERPL-SCNC: 140 MMOL/L (ref 136–145)
SODIUM SERPL-SCNC: 143 MMOL/L (ref 136–145)
TROPONIN I SERPL DL<=0.01 NG/ML-MCNC: 0.04 NG/ML (ref 0–0.03)
TROPONIN I SERPL DL<=0.01 NG/ML-MCNC: 0.04 NG/ML (ref 0–0.03)
VIT B12 SERPL-MCNC: 1304 PG/ML (ref 210–950)
WBC # BLD AUTO: 10.8 K/UL (ref 3.9–12.7)
WBC # BLD AUTO: 6.34 K/UL (ref 3.9–12.7)
WBC # BLD AUTO: 7.23 K/UL (ref 3.9–12.7)
WBC # BLD AUTO: 7.8 K/UL (ref 3.9–12.7)
WBC # BLD AUTO: 7.82 K/UL (ref 3.9–12.7)

## 2021-01-01 PROCEDURE — 99900035 HC TECH TIME PER 15 MIN (STAT)

## 2021-01-01 PROCEDURE — 27000221 HC OXYGEN, UP TO 24 HOURS

## 2021-01-01 PROCEDURE — 3066F NEPHROPATHY DOC TX: CPT | Mod: S$GLB,,, | Performed by: INTERNAL MEDICINE

## 2021-01-01 PROCEDURE — 99406 BEHAV CHNG SMOKING 3-10 MIN: CPT

## 2021-01-01 PROCEDURE — 1101F PT FALLS ASSESS-DOCD LE1/YR: CPT | Mod: S$GLB,,, | Performed by: INTERNAL MEDICINE

## 2021-01-01 PROCEDURE — 27000671 HC TUBING MICROBORE EXT: Performed by: ANESTHESIOLOGY

## 2021-01-01 PROCEDURE — 1159F PR MEDICATION LIST DOCUMENTED IN MEDICAL RECORD: ICD-10-PCS | Mod: S$GLB,,, | Performed by: INTERNAL MEDICINE

## 2021-01-01 PROCEDURE — 99223 PR INITIAL HOSPITAL CARE,LEVL III: ICD-10-PCS | Mod: ,,, | Performed by: STUDENT IN AN ORGANIZED HEALTH CARE EDUCATION/TRAINING PROGRAM

## 2021-01-01 PROCEDURE — 1160F RVW MEDS BY RX/DR IN RCRD: CPT | Mod: S$GLB,,, | Performed by: INTERNAL MEDICINE

## 2021-01-01 PROCEDURE — 99999 PR PBB SHADOW E&M-EST. PATIENT-LVL IV: ICD-10-PCS | Mod: PBBFAC,,, | Performed by: INTERNAL MEDICINE

## 2021-01-01 PROCEDURE — 99999 PR PBB SHADOW E&M-EST. PATIENT-LVL V: ICD-10-PCS | Mod: PBBFAC,,, | Performed by: INTERNAL MEDICINE

## 2021-01-01 PROCEDURE — 27000673 HC TUBING BLOOD Y: Performed by: ANESTHESIOLOGY

## 2021-01-01 PROCEDURE — 94761 N-INVAS EAR/PLS OXIMETRY MLT: CPT

## 2021-01-01 PROCEDURE — 12000002 HC ACUTE/MED SURGE SEMI-PRIVATE ROOM

## 2021-01-01 PROCEDURE — 93010 EKG 12-LEAD: ICD-10-PCS | Mod: ,,, | Performed by: GENERAL PRACTICE

## 2021-01-01 PROCEDURE — 82607 VITAMIN B-12: CPT | Performed by: NURSE PRACTITIONER

## 2021-01-01 PROCEDURE — 96372 THER/PROPH/DIAG INJ SC/IM: CPT | Mod: S$GLB,,, | Performed by: INTERNAL MEDICINE

## 2021-01-01 PROCEDURE — 1100F PR PT FALLS ASSESS DOC 2+ FALLS/FALL W/INJURY/YR: ICD-10-PCS | Mod: S$GLB,,, | Performed by: INTERNAL MEDICINE

## 2021-01-01 PROCEDURE — 3288F PR FALLS RISK ASSESSMENT DOCUMENTED: ICD-10-PCS | Mod: S$GLB,,, | Performed by: INTERNAL MEDICINE

## 2021-01-01 PROCEDURE — 3078F DIAST BP <80 MM HG: CPT | Mod: S$GLB,,, | Performed by: INTERNAL MEDICINE

## 2021-01-01 PROCEDURE — 3008F BODY MASS INDEX DOCD: CPT | Mod: S$GLB,,, | Performed by: INTERNAL MEDICINE

## 2021-01-01 PROCEDURE — 25000003 PHARM REV CODE 250: Performed by: NURSE PRACTITIONER

## 2021-01-01 PROCEDURE — 27202103: Performed by: ANESTHESIOLOGY

## 2021-01-01 PROCEDURE — 25000242 PHARM REV CODE 250 ALT 637 W/ HCPCS: Performed by: EMERGENCY MEDICINE

## 2021-01-01 PROCEDURE — 1159F MED LIST DOCD IN RCRD: CPT | Mod: S$GLB,,, | Performed by: INTERNAL MEDICINE

## 2021-01-01 PROCEDURE — 93005 ELECTROCARDIOGRAM TRACING: CPT

## 2021-01-01 PROCEDURE — 27201423 OPTIME MED/SURG SUP & DEVICES STERILE SUPPLY: Performed by: THORACIC SURGERY (CARDIOTHORACIC VASCULAR SURGERY)

## 2021-01-01 PROCEDURE — 63600175 PHARM REV CODE 636 W HCPCS: Performed by: INTERNAL MEDICINE

## 2021-01-01 PROCEDURE — 88305 TISSUE EXAM BY PATHOLOGIST: CPT | Performed by: STUDENT IN AN ORGANIZED HEALTH CARE EDUCATION/TRAINING PROGRAM

## 2021-01-01 PROCEDURE — 99213 OFFICE O/P EST LOW 20 MIN: CPT | Mod: 25,S$GLB,, | Performed by: INTERNAL MEDICINE

## 2021-01-01 PROCEDURE — 99999 PR PBB SHADOW E&M-EST. PATIENT-LVL IV: CPT | Mod: PBBFAC,,, | Performed by: THORACIC SURGERY (CARDIOTHORACIC VASCULAR SURGERY)

## 2021-01-01 PROCEDURE — 3078F PR MOST RECENT DIASTOLIC BLOOD PRESSURE < 80 MM HG: ICD-10-PCS | Mod: S$GLB,,, | Performed by: INTERNAL MEDICINE

## 2021-01-01 PROCEDURE — 3288F FALL RISK ASSESSMENT DOCD: CPT | Mod: S$GLB,,, | Performed by: INTERNAL MEDICINE

## 2021-01-01 PROCEDURE — 84100 ASSAY OF PHOSPHORUS: CPT | Performed by: NURSE PRACTITIONER

## 2021-01-01 PROCEDURE — 86704 HEP B CORE ANTIBODY TOTAL: CPT | Performed by: INTERNAL MEDICINE

## 2021-01-01 PROCEDURE — 90935 HEMODIALYSIS ONE EVALUATION: CPT

## 2021-01-01 PROCEDURE — 3008F PR BODY MASS INDEX (BMI) DOCUMENTED: ICD-10-PCS | Mod: S$GLB,,, | Performed by: INTERNAL MEDICINE

## 2021-01-01 PROCEDURE — 94640 AIRWAY INHALATION TREATMENT: CPT

## 2021-01-01 PROCEDURE — 1125F AMNT PAIN NOTED PAIN PRSNT: CPT | Mod: S$GLB,,, | Performed by: INTERNAL MEDICINE

## 2021-01-01 PROCEDURE — 84484 ASSAY OF TROPONIN QUANT: CPT | Performed by: NURSE PRACTITIONER

## 2021-01-01 PROCEDURE — 1160F PR REVIEW ALL MEDS BY PRESCRIBER/CLIN PHARMACIST DOCUMENTED: ICD-10-PCS | Mod: S$GLB,,, | Performed by: INTERNAL MEDICINE

## 2021-01-01 PROCEDURE — 37000008 HC ANESTHESIA 1ST 15 MINUTES: Performed by: THORACIC SURGERY (CARDIOTHORACIC VASCULAR SURGERY)

## 2021-01-01 PROCEDURE — 99999 PR PBB SHADOW E&M-EST. PATIENT-LVL V: CPT | Mod: PBBFAC,,, | Performed by: INTERNAL MEDICINE

## 2021-01-01 PROCEDURE — 99900031 HC PATIENT EDUCATION (STAT)

## 2021-01-01 PROCEDURE — 3066F PR DOCUMENTATION OF TREATMENT FOR NEPHROPATHY: ICD-10-PCS | Mod: S$GLB,,, | Performed by: INTERNAL MEDICINE

## 2021-01-01 PROCEDURE — 11000001 HC ACUTE MED/SURG PRIVATE ROOM

## 2021-01-01 PROCEDURE — C9248 INJ, CLEVIDIPINE BUTYRATE: HCPCS | Performed by: NURSE ANESTHETIST, CERTIFIED REGISTERED

## 2021-01-01 PROCEDURE — 3074F PR MOST RECENT SYSTOLIC BLOOD PRESSURE < 130 MM HG: ICD-10-PCS | Mod: S$GLB,,, | Performed by: INTERNAL MEDICINE

## 2021-01-01 PROCEDURE — 36000707: Performed by: THORACIC SURGERY (CARDIOTHORACIC VASCULAR SURGERY)

## 2021-01-01 PROCEDURE — 63600175 PHARM REV CODE 636 W HCPCS: Performed by: NURSE ANESTHETIST, CERTIFIED REGISTERED

## 2021-01-01 PROCEDURE — 71000039 HC RECOVERY, EACH ADD'L HOUR: Performed by: STUDENT IN AN ORGANIZED HEALTH CARE EDUCATION/TRAINING PROGRAM

## 2021-01-01 PROCEDURE — 85025 COMPLETE CBC W/AUTO DIFF WBC: CPT | Performed by: NURSE PRACTITIONER

## 2021-01-01 PROCEDURE — 1101F PR PT FALLS ASSESS DOC 0-1 FALLS W/OUT INJ PAST YR: ICD-10-PCS | Mod: S$GLB,,, | Performed by: INTERNAL MEDICINE

## 2021-01-01 PROCEDURE — 71000033 HC RECOVERY, INTIAL HOUR: Performed by: STUDENT IN AN ORGANIZED HEALTH CARE EDUCATION/TRAINING PROGRAM

## 2021-01-01 PROCEDURE — 96372 PR INJECTION,THERAP/PROPH/DIAG2ST, IM OR SUBCUT: ICD-10-PCS | Mod: S$GLB,,, | Performed by: INTERNAL MEDICINE

## 2021-01-01 PROCEDURE — 27000654 HC CATH IV JELCO: Performed by: ANESTHESIOLOGY

## 2021-01-01 PROCEDURE — 82746 ASSAY OF FOLIC ACID SERUM: CPT | Performed by: NURSE PRACTITIONER

## 2021-01-01 PROCEDURE — 3288F FALL RISK ASSESSMENT DOCD: CPT | Mod: S$GLB,,, | Performed by: THORACIC SURGERY (CARDIOTHORACIC VASCULAR SURGERY)

## 2021-01-01 PROCEDURE — 37000009 HC ANESTHESIA EA ADD 15 MINS: Performed by: STUDENT IN AN ORGANIZED HEALTH CARE EDUCATION/TRAINING PROGRAM

## 2021-01-01 PROCEDURE — 1126F PR PAIN SEVERITY QUANTIFIED, NO PAIN PRESENT: ICD-10-PCS | Mod: S$GLB,,, | Performed by: INTERNAL MEDICINE

## 2021-01-01 PROCEDURE — 25000242 PHARM REV CODE 250 ALT 637 W/ HCPCS: Performed by: THORACIC SURGERY (CARDIOTHORACIC VASCULAR SURGERY)

## 2021-01-01 PROCEDURE — 99214 PR OFFICE/OUTPT VISIT, EST, LEVL IV, 30-39 MIN: ICD-10-PCS | Mod: 25,S$GLB,, | Performed by: INTERNAL MEDICINE

## 2021-01-01 PROCEDURE — 99214 OFFICE O/P EST MOD 30 MIN: CPT | Mod: S$GLB,,, | Performed by: INTERNAL MEDICINE

## 2021-01-01 PROCEDURE — 99223 1ST HOSP IP/OBS HIGH 75: CPT | Mod: ,,, | Performed by: INTERNAL MEDICINE

## 2021-01-01 PROCEDURE — 80069 RENAL FUNCTION PANEL: CPT | Performed by: NURSE PRACTITIONER

## 2021-01-01 PROCEDURE — 36415 COLL VENOUS BLD VENIPUNCTURE: CPT | Performed by: NURSE PRACTITIONER

## 2021-01-01 PROCEDURE — 88305 TISSUE EXAM BY PATHOLOGIST: ICD-10-PCS | Mod: 26,,, | Performed by: STUDENT IN AN ORGANIZED HEALTH CARE EDUCATION/TRAINING PROGRAM

## 2021-01-01 PROCEDURE — 3008F BODY MASS INDEX DOCD: CPT | Mod: S$GLB,,, | Performed by: THORACIC SURGERY (CARDIOTHORACIC VASCULAR SURGERY)

## 2021-01-01 PROCEDURE — 1126F PR PAIN SEVERITY QUANTIFIED, NO PAIN PRESENT: ICD-10-PCS | Mod: S$GLB,,, | Performed by: THORACIC SURGERY (CARDIOTHORACIC VASCULAR SURGERY)

## 2021-01-01 PROCEDURE — 99214 OFFICE O/P EST MOD 30 MIN: CPT | Mod: 25,S$GLB,, | Performed by: INTERNAL MEDICINE

## 2021-01-01 PROCEDURE — 99231 PR SUBSEQUENT HOSPITAL CARE,LEVL I: ICD-10-PCS | Mod: ,,, | Performed by: INTERNAL MEDICINE

## 2021-01-01 PROCEDURE — 99232 SBSQ HOSP IP/OBS MODERATE 35: CPT | Mod: S$GLB,,, | Performed by: INTERNAL MEDICINE

## 2021-01-01 PROCEDURE — 86900 BLOOD TYPING SEROLOGIC ABO: CPT | Performed by: THORACIC SURGERY (CARDIOTHORACIC VASCULAR SURGERY)

## 2021-01-01 PROCEDURE — 99999 PR PBB SHADOW E&M-EST. PATIENT-LVL IV: CPT | Mod: PBBFAC,,, | Performed by: INTERNAL MEDICINE

## 2021-01-01 PROCEDURE — 25000003 PHARM REV CODE 250: Performed by: THORACIC SURGERY (CARDIOTHORACIC VASCULAR SURGERY)

## 2021-01-01 PROCEDURE — 27201107 HC STYLET, STANDARD: Performed by: ANESTHESIOLOGY

## 2021-01-01 PROCEDURE — 25000003 PHARM REV CODE 250: Performed by: ANESTHESIOLOGY

## 2021-01-01 PROCEDURE — 25000003 PHARM REV CODE 250: Performed by: INTERNAL MEDICINE

## 2021-01-01 PROCEDURE — U0002 COVID-19 LAB TEST NON-CDC: HCPCS | Performed by: PHYSICIAN ASSISTANT

## 2021-01-01 PROCEDURE — 3075F PR MOST RECENT SYSTOLIC BLOOD PRESS GE 130-139MM HG: ICD-10-PCS | Mod: S$GLB,,, | Performed by: INTERNAL MEDICINE

## 2021-01-01 PROCEDURE — 36000707: Performed by: STUDENT IN AN ORGANIZED HEALTH CARE EDUCATION/TRAINING PROGRAM

## 2021-01-01 PROCEDURE — 80100014 HC HEMODIALYSIS 1:1

## 2021-01-01 PROCEDURE — 88112 PR  CYTOPATH, CELL ENHANCE TECH: ICD-10-PCS | Mod: 26,,, | Performed by: STUDENT IN AN ORGANIZED HEALTH CARE EDUCATION/TRAINING PROGRAM

## 2021-01-01 PROCEDURE — 1126F AMNT PAIN NOTED NONE PRSNT: CPT | Mod: S$GLB,,, | Performed by: THORACIC SURGERY (CARDIOTHORACIC VASCULAR SURGERY)

## 2021-01-01 PROCEDURE — 85025 COMPLETE CBC W/AUTO DIFF WBC: CPT | Performed by: THORACIC SURGERY (CARDIOTHORACIC VASCULAR SURGERY)

## 2021-01-01 PROCEDURE — 99223 PR INITIAL HOSPITAL CARE,LEVL III: ICD-10-PCS | Mod: ,,, | Performed by: INTERNAL MEDICINE

## 2021-01-01 PROCEDURE — 63600175 PHARM REV CODE 636 W HCPCS: Performed by: ANESTHESIOLOGY

## 2021-01-01 PROCEDURE — 36000706: Performed by: STUDENT IN AN ORGANIZED HEALTH CARE EDUCATION/TRAINING PROGRAM

## 2021-01-01 PROCEDURE — 99233 PR SUBSEQUENT HOSPITAL CARE,LEVL III: ICD-10-PCS | Mod: ,,, | Performed by: INTERNAL MEDICINE

## 2021-01-01 PROCEDURE — C1729 CATH, DRAINAGE: HCPCS | Performed by: THORACIC SURGERY (CARDIOTHORACIC VASCULAR SURGERY)

## 2021-01-01 PROCEDURE — 99213 OFFICE O/P EST LOW 20 MIN: CPT | Mod: S$GLB,,, | Performed by: INTERNAL MEDICINE

## 2021-01-01 PROCEDURE — 32554 PR THORACEN W/O IMAG GUIDANC: ICD-10-PCS | Mod: RT,,, | Performed by: STUDENT IN AN ORGANIZED HEALTH CARE EDUCATION/TRAINING PROGRAM

## 2021-01-01 PROCEDURE — P9047 ALBUMIN (HUMAN), 25%, 50ML: HCPCS | Performed by: INTERNAL MEDICINE

## 2021-01-01 PROCEDURE — 85025 COMPLETE CBC W/AUTO DIFF WBC: CPT | Performed by: EMERGENCY MEDICINE

## 2021-01-01 PROCEDURE — 35301 PR THROMBOENDARTECTMY NECK,NECK INCIS: ICD-10-PCS | Mod: RT,,, | Performed by: THORACIC SURGERY (CARDIOTHORACIC VASCULAR SURGERY)

## 2021-01-01 PROCEDURE — 36415 COLL VENOUS BLD VENIPUNCTURE: CPT | Performed by: EMERGENCY MEDICINE

## 2021-01-01 PROCEDURE — 80053 COMPREHEN METABOLIC PANEL: CPT | Performed by: EMERGENCY MEDICINE

## 2021-01-01 PROCEDURE — 27800903 OPTIME MED/SURG SUP & DEVICES OTHER IMPLANTS: Performed by: THORACIC SURGERY (CARDIOTHORACIC VASCULAR SURGERY)

## 2021-01-01 PROCEDURE — 25000003 PHARM REV CODE 250: Performed by: HOSPITALIST

## 2021-01-01 PROCEDURE — 27201247 HC HEMODIALYSIS, SET-UP & CANCEL

## 2021-01-01 PROCEDURE — 3074F SYST BP LT 130 MM HG: CPT | Mod: S$GLB,,, | Performed by: INTERNAL MEDICINE

## 2021-01-01 PROCEDURE — 94618 PULMONARY STRESS TESTING: CPT

## 2021-01-01 PROCEDURE — C1729 CATH, DRAINAGE: HCPCS | Performed by: STUDENT IN AN ORGANIZED HEALTH CARE EDUCATION/TRAINING PROGRAM

## 2021-01-01 PROCEDURE — 99999 PR PBB SHADOW E&M-EST. PATIENT-LVL IV: ICD-10-PCS | Mod: PBBFAC,,, | Performed by: THORACIC SURGERY (CARDIOTHORACIC VASCULAR SURGERY)

## 2021-01-01 PROCEDURE — 3075F SYST BP GE 130 - 139MM HG: CPT | Mod: S$GLB,,, | Performed by: INTERNAL MEDICINE

## 2021-01-01 PROCEDURE — 27000658 HC ARTERIAL LINE ALL: Performed by: ANESTHESIOLOGY

## 2021-01-01 PROCEDURE — 27000284 HC CANNULA NASAL: Performed by: ANESTHESIOLOGY

## 2021-01-01 PROCEDURE — 83880 ASSAY OF NATRIURETIC PEPTIDE: CPT | Performed by: EMERGENCY MEDICINE

## 2021-01-01 PROCEDURE — 1125F PR PAIN SEVERITY QUANTIFIED, PAIN PRESENT: ICD-10-PCS | Mod: S$GLB,,, | Performed by: INTERNAL MEDICINE

## 2021-01-01 PROCEDURE — 63600175 PHARM REV CODE 636 W HCPCS: Performed by: STUDENT IN AN ORGANIZED HEALTH CARE EDUCATION/TRAINING PROGRAM

## 2021-01-01 PROCEDURE — 93010 ELECTROCARDIOGRAM REPORT: CPT | Mod: ,,, | Performed by: GENERAL PRACTICE

## 2021-01-01 PROCEDURE — 27000656 HC EYE GOGGLES: Performed by: ANESTHESIOLOGY

## 2021-01-01 PROCEDURE — 1101F PT FALLS ASSESS-DOCD LE1/YR: CPT | Mod: S$GLB,,, | Performed by: THORACIC SURGERY (CARDIOTHORACIC VASCULAR SURGERY)

## 2021-01-01 PROCEDURE — 1126F AMNT PAIN NOTED NONE PRSNT: CPT | Mod: S$GLB,,, | Performed by: INTERNAL MEDICINE

## 2021-01-01 PROCEDURE — 27200677 HC TRANSDUCER MONITOR KIT SINGLE: Performed by: ANESTHESIOLOGY

## 2021-01-01 PROCEDURE — 63600175 PHARM REV CODE 636 W HCPCS: Performed by: THORACIC SURGERY (CARDIOTHORACIC VASCULAR SURGERY)

## 2021-01-01 PROCEDURE — 25000003 PHARM REV CODE 250: Performed by: NURSE ANESTHETIST, CERTIFIED REGISTERED

## 2021-01-01 PROCEDURE — 25500020 PHARM REV CODE 255

## 2021-01-01 PROCEDURE — 99214 PR OFFICE/OUTPT VISIT, EST, LEVL IV, 30-39 MIN: ICD-10-PCS | Mod: S$GLB,,, | Performed by: INTERNAL MEDICINE

## 2021-01-01 PROCEDURE — 83970 ASSAY OF PARATHORMONE: CPT | Performed by: NURSE PRACTITIONER

## 2021-01-01 PROCEDURE — 1100F PTFALLS ASSESS-DOCD GE2>/YR: CPT | Mod: S$GLB,,, | Performed by: INTERNAL MEDICINE

## 2021-01-01 PROCEDURE — 85018 HEMOGLOBIN: CPT | Performed by: THORACIC SURGERY (CARDIOTHORACIC VASCULAR SURGERY)

## 2021-01-01 PROCEDURE — 63600175 PHARM REV CODE 636 W HCPCS: Performed by: NURSE PRACTITIONER

## 2021-01-01 PROCEDURE — 85014 HEMATOCRIT: CPT | Performed by: THORACIC SURGERY (CARDIOTHORACIC VASCULAR SURGERY)

## 2021-01-01 PROCEDURE — 80048 BASIC METABOLIC PNL TOTAL CA: CPT | Mod: 91 | Performed by: THORACIC SURGERY (CARDIOTHORACIC VASCULAR SURGERY)

## 2021-01-01 PROCEDURE — C9248 INJ, CLEVIDIPINE BUTYRATE: HCPCS | Performed by: THORACIC SURGERY (CARDIOTHORACIC VASCULAR SURGERY)

## 2021-01-01 PROCEDURE — D9220A PRA ANESTHESIA: ICD-10-PCS | Mod: CRNA,,, | Performed by: NURSE ANESTHETIST, CERTIFIED REGISTERED

## 2021-01-01 PROCEDURE — 99223 1ST HOSP IP/OBS HIGH 75: CPT | Mod: ,,, | Performed by: STUDENT IN AN ORGANIZED HEALTH CARE EDUCATION/TRAINING PROGRAM

## 2021-01-01 PROCEDURE — 35301 RECHANNELING OF ARTERY: CPT | Mod: RT,,, | Performed by: THORACIC SURGERY (CARDIOTHORACIC VASCULAR SURGERY)

## 2021-01-01 PROCEDURE — 84484 ASSAY OF TROPONIN QUANT: CPT | Performed by: EMERGENCY MEDICINE

## 2021-01-01 PROCEDURE — 87340 HEPATITIS B SURFACE AG IA: CPT | Performed by: INTERNAL MEDICINE

## 2021-01-01 PROCEDURE — 27202107 HC XP QUATRO SENSOR: Performed by: ANESTHESIOLOGY

## 2021-01-01 PROCEDURE — 36000706: Performed by: THORACIC SURGERY (CARDIOTHORACIC VASCULAR SURGERY)

## 2021-01-01 PROCEDURE — 99214 PR OFFICE/OUTPT VISIT, EST, LEVL IV, 30-39 MIN: ICD-10-PCS | Mod: S$GLB,,, | Performed by: THORACIC SURGERY (CARDIOTHORACIC VASCULAR SURGERY)

## 2021-01-01 PROCEDURE — 27100171 HC OXYGEN HIGH FLOW UP TO 24 HOURS

## 2021-01-01 PROCEDURE — 88112 CYTOPATH CELL ENHANCE TECH: CPT | Performed by: STUDENT IN AN ORGANIZED HEALTH CARE EDUCATION/TRAINING PROGRAM

## 2021-01-01 PROCEDURE — 3288F PR FALLS RISK ASSESSMENT DOCUMENTED: ICD-10-PCS | Mod: S$GLB,,, | Performed by: THORACIC SURGERY (CARDIOTHORACIC VASCULAR SURGERY)

## 2021-01-01 PROCEDURE — D9220A PRA ANESTHESIA: Mod: CRNA,,, | Performed by: NURSE ANESTHETIST, CERTIFIED REGISTERED

## 2021-01-01 PROCEDURE — 32554 ASPIRATE PLEURA W/O IMAGING: CPT | Mod: RT,,, | Performed by: STUDENT IN AN ORGANIZED HEALTH CARE EDUCATION/TRAINING PROGRAM

## 2021-01-01 PROCEDURE — 99232 PR SUBSEQUENT HOSPITAL CARE,LEVL II: ICD-10-PCS | Mod: S$GLB,,, | Performed by: INTERNAL MEDICINE

## 2021-01-01 PROCEDURE — 88305 TISSUE EXAM BY PATHOLOGIST: CPT | Mod: 26,,, | Performed by: STUDENT IN AN ORGANIZED HEALTH CARE EDUCATION/TRAINING PROGRAM

## 2021-01-01 PROCEDURE — 1159F PR MEDICATION LIST DOCUMENTED IN MEDICAL RECORD: ICD-10-PCS | Mod: S$GLB,,, | Performed by: THORACIC SURGERY (CARDIOTHORACIC VASCULAR SURGERY)

## 2021-01-01 PROCEDURE — 99285 EMERGENCY DEPT VISIT HI MDM: CPT | Mod: 25

## 2021-01-01 PROCEDURE — 88112 CYTOPATH CELL ENHANCE TECH: CPT | Mod: 26,,, | Performed by: STUDENT IN AN ORGANIZED HEALTH CARE EDUCATION/TRAINING PROGRAM

## 2021-01-01 PROCEDURE — 97165 OT EVAL LOW COMPLEX 30 MIN: CPT

## 2021-01-01 PROCEDURE — 20000000 HC ICU ROOM

## 2021-01-01 PROCEDURE — 99900104 DSU ONLY-NO CHARGE-EA ADD'L HR (STAT): Performed by: STUDENT IN AN ORGANIZED HEALTH CARE EDUCATION/TRAINING PROGRAM

## 2021-01-01 PROCEDURE — 27000675 HC TUBING MICRODRIP: Performed by: ANESTHESIOLOGY

## 2021-01-01 PROCEDURE — 36415 COLL VENOUS BLD VENIPUNCTURE: CPT | Performed by: INTERNAL MEDICINE

## 2021-01-01 PROCEDURE — 99213 PR OFFICE/OUTPT VISIT, EST, LEVL III, 20-29 MIN: ICD-10-PCS | Mod: S$GLB,,, | Performed by: INTERNAL MEDICINE

## 2021-01-01 PROCEDURE — U0002 COVID-19 LAB TEST NON-CDC: HCPCS | Performed by: EMERGENCY MEDICINE

## 2021-01-01 PROCEDURE — 3066F PR DOCUMENTATION OF TREATMENT FOR NEPHROPATHY: ICD-10-PCS | Mod: CPTII,S$GLB,, | Performed by: INTERNAL MEDICINE

## 2021-01-01 PROCEDURE — 36415 COLL VENOUS BLD VENIPUNCTURE: CPT | Performed by: THORACIC SURGERY (CARDIOTHORACIC VASCULAR SURGERY)

## 2021-01-01 PROCEDURE — 85027 COMPLETE CBC AUTOMATED: CPT | Performed by: THORACIC SURGERY (CARDIOTHORACIC VASCULAR SURGERY)

## 2021-01-01 PROCEDURE — D9220A PRA ANESTHESIA: ICD-10-PCS | Mod: ANES,,, | Performed by: ANESTHESIOLOGY

## 2021-01-01 PROCEDURE — 1101F PR PT FALLS ASSESS DOC 0-1 FALLS W/OUT INJ PAST YR: ICD-10-PCS | Mod: S$GLB,,, | Performed by: THORACIC SURGERY (CARDIOTHORACIC VASCULAR SURGERY)

## 2021-01-01 PROCEDURE — 99900103 DSU ONLY-NO CHARGE-INITIAL HR (STAT): Performed by: STUDENT IN AN ORGANIZED HEALTH CARE EDUCATION/TRAINING PROGRAM

## 2021-01-01 PROCEDURE — 99214 OFFICE O/P EST MOD 30 MIN: CPT | Mod: S$GLB,,, | Performed by: THORACIC SURGERY (CARDIOTHORACIC VASCULAR SURGERY)

## 2021-01-01 PROCEDURE — 37000009 HC ANESTHESIA EA ADD 15 MINS: Performed by: THORACIC SURGERY (CARDIOTHORACIC VASCULAR SURGERY)

## 2021-01-01 PROCEDURE — 27201480 HC GLIDESCOPE: Performed by: ANESTHESIOLOGY

## 2021-01-01 PROCEDURE — 80048 BASIC METABOLIC PNL TOTAL CA: CPT | Performed by: THORACIC SURGERY (CARDIOTHORACIC VASCULAR SURGERY)

## 2021-01-01 PROCEDURE — 80053 COMPREHEN METABOLIC PANEL: CPT | Performed by: THORACIC SURGERY (CARDIOTHORACIC VASCULAR SURGERY)

## 2021-01-01 PROCEDURE — 1159F MED LIST DOCD IN RCRD: CPT | Mod: S$GLB,,, | Performed by: THORACIC SURGERY (CARDIOTHORACIC VASCULAR SURGERY)

## 2021-01-01 PROCEDURE — 99231 SBSQ HOSP IP/OBS SF/LOW 25: CPT | Mod: ,,, | Performed by: INTERNAL MEDICINE

## 2021-01-01 PROCEDURE — 3066F NEPHROPATHY DOC TX: CPT | Mod: CPTII,S$GLB,, | Performed by: INTERNAL MEDICINE

## 2021-01-01 PROCEDURE — 99213 PR OFFICE/OUTPT VISIT, EST, LEVL III, 20-29 MIN: ICD-10-PCS | Mod: 25,S$GLB,, | Performed by: INTERNAL MEDICINE

## 2021-01-01 PROCEDURE — 63600175 PHARM REV CODE 636 W HCPCS

## 2021-01-01 PROCEDURE — D9220A PRA ANESTHESIA: Mod: ANES,,, | Performed by: ANESTHESIOLOGY

## 2021-01-01 PROCEDURE — 80053 COMPREHEN METABOLIC PANEL: CPT | Performed by: NURSE PRACTITIONER

## 2021-01-01 PROCEDURE — 86706 HEP B SURFACE ANTIBODY: CPT | Performed by: INTERNAL MEDICINE

## 2021-01-01 PROCEDURE — 83735 ASSAY OF MAGNESIUM: CPT | Performed by: NURSE PRACTITIONER

## 2021-01-01 PROCEDURE — 3008F PR BODY MASS INDEX (BMI) DOCUMENTED: ICD-10-PCS | Mod: S$GLB,,, | Performed by: THORACIC SURGERY (CARDIOTHORACIC VASCULAR SURGERY)

## 2021-01-01 PROCEDURE — C1763 CONN TISS, NON-HUMAN: HCPCS | Performed by: THORACIC SURGERY (CARDIOTHORACIC VASCULAR SURGERY)

## 2021-01-01 PROCEDURE — 37000008 HC ANESTHESIA 1ST 15 MINUTES: Performed by: STUDENT IN AN ORGANIZED HEALTH CARE EDUCATION/TRAINING PROGRAM

## 2021-01-01 PROCEDURE — 3077F SYST BP >= 140 MM HG: CPT | Mod: S$GLB,,, | Performed by: INTERNAL MEDICINE

## 2021-01-01 PROCEDURE — 88304 TISSUE EXAM BY PATHOLOGIST: CPT | Mod: TC

## 2021-01-01 PROCEDURE — 3077F PR MOST RECENT SYSTOLIC BLOOD PRESSURE >= 140 MM HG: ICD-10-PCS | Mod: S$GLB,,, | Performed by: INTERNAL MEDICINE

## 2021-01-01 PROCEDURE — 99233 SBSQ HOSP IP/OBS HIGH 50: CPT | Mod: ,,, | Performed by: INTERNAL MEDICINE

## 2021-01-01 DEVICE — PATCH VASCULAR .8CMX8CM: Type: IMPLANTABLE DEVICE | Site: CAROTID | Status: FUNCTIONAL

## 2021-01-01 RX ORDER — FLUTICASONE FUROATE AND VILANTEROL 100; 25 UG/1; UG/1
1 POWDER RESPIRATORY (INHALATION) DAILY
Status: DISCONTINUED | OUTPATIENT
Start: 2021-01-01 | End: 2021-01-01 | Stop reason: HOSPADM

## 2021-01-01 RX ORDER — LANOLIN ALCOHOL/MO/W.PET/CERES
800 CREAM (GRAM) TOPICAL
Status: DISCONTINUED | OUTPATIENT
Start: 2021-01-01 | End: 2022-01-01 | Stop reason: HOSPADM

## 2021-01-01 RX ORDER — CLOPIDOGREL BISULFATE 75 MG/1
75 TABLET, FILM COATED ORAL DAILY
Status: ON HOLD | COMMUNITY
Start: 2021-01-01 | End: 2021-01-01 | Stop reason: HOSPADM

## 2021-01-01 RX ORDER — MORPHINE SULFATE 4 MG/ML
4 INJECTION, SOLUTION INTRAMUSCULAR; INTRAVENOUS EVERY 6 HOURS PRN
Status: DISCONTINUED | OUTPATIENT
Start: 2021-01-01 | End: 2021-01-01

## 2021-01-01 RX ORDER — BENZONATATE 100 MG/1
100 CAPSULE ORAL 3 TIMES DAILY PRN
Qty: 30 CAPSULE | Refills: 0 | Status: SHIPPED | OUTPATIENT
Start: 2021-01-01 | End: 2021-01-01

## 2021-01-01 RX ORDER — NALOXONE HCL 0.4 MG/ML
0.02 VIAL (ML) INJECTION
Status: DISCONTINUED | OUTPATIENT
Start: 2021-01-01 | End: 2021-01-01 | Stop reason: HOSPADM

## 2021-01-01 RX ORDER — BUTALBITAL, ACETAMINOPHEN AND CAFFEINE 50; 325; 40 MG/1; MG/1; MG/1
1 TABLET ORAL DAILY PRN
Qty: 30 TABLET | Refills: 0 | Status: SHIPPED | OUTPATIENT
Start: 2021-01-01 | End: 2021-01-01

## 2021-01-01 RX ORDER — ACETAMINOPHEN 325 MG/1
650 TABLET ORAL EVERY 6 HOURS PRN
Status: DISCONTINUED | OUTPATIENT
Start: 2021-01-01 | End: 2022-01-01 | Stop reason: HOSPADM

## 2021-01-01 RX ORDER — ONDANSETRON 2 MG/ML
INJECTION INTRAMUSCULAR; INTRAVENOUS
Status: DISCONTINUED | OUTPATIENT
Start: 2021-01-01 | End: 2021-01-01

## 2021-01-01 RX ORDER — TRIAMCINOLONE ACETONIDE 40 MG/ML
40 INJECTION, SUSPENSION INTRA-ARTICULAR; INTRAMUSCULAR ONCE
Status: COMPLETED | OUTPATIENT
Start: 2021-01-01 | End: 2021-01-01

## 2021-01-01 RX ORDER — LEVOFLOXACIN 250 MG/1
250 TABLET ORAL DAILY
Qty: 10 TABLET | Refills: 0 | Status: SHIPPED | OUTPATIENT
Start: 2021-01-01 | End: 2021-01-01

## 2021-01-01 RX ORDER — LIDOCAINE HYDROCHLORIDE 20 MG/ML
INJECTION, SOLUTION EPIDURAL; INFILTRATION; INTRACAUDAL; PERINEURAL
Status: DISCONTINUED | OUTPATIENT
Start: 2021-01-01 | End: 2021-01-01

## 2021-01-01 RX ORDER — ATORVASTATIN CALCIUM 20 MG/1
20 TABLET, FILM COATED ORAL DAILY
Status: DISCONTINUED | OUTPATIENT
Start: 2021-01-01 | End: 2021-01-01 | Stop reason: HOSPADM

## 2021-01-01 RX ORDER — HYDROMORPHONE HYDROCHLORIDE 1 MG/ML
1 INJECTION, SOLUTION INTRAMUSCULAR; INTRAVENOUS; SUBCUTANEOUS EVERY 4 HOURS PRN
Status: DISCONTINUED | OUTPATIENT
Start: 2021-01-01 | End: 2021-01-01 | Stop reason: HOSPADM

## 2021-01-01 RX ORDER — ONDANSETRON 2 MG/ML
4 INJECTION INTRAMUSCULAR; INTRAVENOUS EVERY 8 HOURS PRN
Status: DISCONTINUED | OUTPATIENT
Start: 2021-01-01 | End: 2022-01-01 | Stop reason: HOSPADM

## 2021-01-01 RX ORDER — GUAIFENESIN/DEXTROMETHORPHAN 100-10MG/5
10 SYRUP ORAL EVERY 6 HOURS
Status: DISCONTINUED | OUTPATIENT
Start: 2021-01-01 | End: 2021-01-01 | Stop reason: HOSPADM

## 2021-01-01 RX ORDER — AMLODIPINE BESYLATE 5 MG/1
10 TABLET ORAL DAILY
Status: DISCONTINUED | OUTPATIENT
Start: 2021-01-01 | End: 2021-01-01

## 2021-01-01 RX ORDER — DIPHENHYDRAMINE HYDROCHLORIDE 50 MG/ML
25 INJECTION INTRAMUSCULAR; INTRAVENOUS EVERY 6 HOURS PRN
Status: DISCONTINUED | OUTPATIENT
Start: 2021-01-01 | End: 2021-01-01 | Stop reason: HOSPADM

## 2021-01-01 RX ORDER — CEFAZOLIN SODIUM 2 G/50ML
2 SOLUTION INTRAVENOUS
Status: COMPLETED | OUTPATIENT
Start: 2021-01-01 | End: 2021-01-01

## 2021-01-01 RX ORDER — MUPIROCIN 20 MG/G
1 OINTMENT TOPICAL 2 TIMES DAILY
Status: DISCONTINUED | OUTPATIENT
Start: 2021-01-01 | End: 2021-01-01 | Stop reason: HOSPADM

## 2021-01-01 RX ORDER — SUCCINYLCHOLINE CHLORIDE 20 MG/ML
INJECTION INTRAMUSCULAR; INTRAVENOUS
Status: DISCONTINUED | OUTPATIENT
Start: 2021-01-01 | End: 2021-01-01

## 2021-01-01 RX ORDER — AZITHROMYCIN 250 MG/1
TABLET, FILM COATED ORAL
Qty: 6 TABLET | Refills: 0 | Status: SHIPPED | OUTPATIENT
Start: 2021-01-01 | End: 2021-01-01

## 2021-01-01 RX ORDER — HYDRALAZINE HYDROCHLORIDE 25 MG/1
25 TABLET, FILM COATED ORAL 2 TIMES DAILY
Status: DISCONTINUED | OUTPATIENT
Start: 2021-01-01 | End: 2021-01-01

## 2021-01-01 RX ORDER — MUPIROCIN 20 MG/G
OINTMENT TOPICAL 2 TIMES DAILY
Status: DISCONTINUED | OUTPATIENT
Start: 2021-01-01 | End: 2021-01-01 | Stop reason: HOSPADM

## 2021-01-01 RX ORDER — PHENYLEPHRINE HYDROCHLORIDE 10 MG/ML
INJECTION INTRAVENOUS
Status: DISCONTINUED | OUTPATIENT
Start: 2021-01-01 | End: 2021-01-01

## 2021-01-01 RX ORDER — MAG HYDROX/ALUMINUM HYD/SIMETH 200-200-20
30 SUSPENSION, ORAL (FINAL DOSE FORM) ORAL 4 TIMES DAILY PRN
Status: DISCONTINUED | OUTPATIENT
Start: 2021-01-01 | End: 2022-01-01 | Stop reason: HOSPADM

## 2021-01-01 RX ORDER — LIDOCAINE HYDROCHLORIDE 40 MG/ML
SOLUTION TOPICAL
Status: DISCONTINUED | OUTPATIENT
Start: 2021-01-01 | End: 2021-01-01

## 2021-01-01 RX ORDER — GLUCAGON 1 MG
1 KIT INJECTION
Status: DISCONTINUED | OUTPATIENT
Start: 2021-01-01 | End: 2022-01-01 | Stop reason: HOSPADM

## 2021-01-01 RX ORDER — KETOROLAC TROMETHAMINE 30 MG/ML
30 INJECTION, SOLUTION INTRAMUSCULAR; INTRAVENOUS ONCE
Status: COMPLETED | OUTPATIENT
Start: 2021-01-01 | End: 2021-01-01

## 2021-01-01 RX ORDER — MAG HYDROX/ALUMINUM HYD/SIMETH 200-200-20
30 SUSPENSION, ORAL (FINAL DOSE FORM) ORAL 4 TIMES DAILY PRN
Status: DISCONTINUED | OUTPATIENT
Start: 2021-01-01 | End: 2021-01-01 | Stop reason: HOSPADM

## 2021-01-01 RX ORDER — DIPHENHYDRAMINE HYDROCHLORIDE 50 MG/ML
12.5 INJECTION INTRAMUSCULAR; INTRAVENOUS
Status: DISCONTINUED | OUTPATIENT
Start: 2021-01-01 | End: 2021-01-01 | Stop reason: HOSPADM

## 2021-01-01 RX ORDER — CYPROHEPTADINE HYDROCHLORIDE 4 MG/1
4 TABLET ORAL 2 TIMES DAILY
Status: DISCONTINUED | OUTPATIENT
Start: 2021-01-01 | End: 2022-01-01 | Stop reason: HOSPADM

## 2021-01-01 RX ORDER — CHLORHEXIDINE GLUCONATE ORAL RINSE 1.2 MG/ML
15 SOLUTION DENTAL 2 TIMES DAILY
Status: DISCONTINUED | OUTPATIENT
Start: 2021-01-01 | End: 2021-01-01 | Stop reason: HOSPADM

## 2021-01-01 RX ORDER — ETOMIDATE 2 MG/ML
INJECTION INTRAVENOUS
Status: DISCONTINUED | OUTPATIENT
Start: 2021-01-01 | End: 2021-01-01

## 2021-01-01 RX ORDER — IRON SUCROSE 20 MG/ML
100 INJECTION, SOLUTION INTRAVENOUS
Status: ON HOLD | COMMUNITY
Start: 2021-01-01 | End: 2021-01-01 | Stop reason: HOSPADM

## 2021-01-01 RX ORDER — ONDANSETRON 2 MG/ML
4 INJECTION INTRAMUSCULAR; INTRAVENOUS DAILY PRN
Status: DISCONTINUED | OUTPATIENT
Start: 2021-01-01 | End: 2021-01-01 | Stop reason: HOSPADM

## 2021-01-01 RX ORDER — SODIUM CHLORIDE 9 MG/ML
INJECTION, SOLUTION INTRAVENOUS CONTINUOUS
Status: DISCONTINUED | OUTPATIENT
Start: 2021-01-01 | End: 2022-01-01 | Stop reason: HOSPADM

## 2021-01-01 RX ORDER — DEXAMETHASONE SODIUM PHOSPHATE 4 MG/ML
INJECTION, SOLUTION INTRA-ARTICULAR; INTRALESIONAL; INTRAMUSCULAR; INTRAVENOUS; SOFT TISSUE
Status: DISCONTINUED | OUTPATIENT
Start: 2021-01-01 | End: 2021-01-01

## 2021-01-01 RX ORDER — MEPERIDINE HYDROCHLORIDE 50 MG/ML
12.5 INJECTION INTRAMUSCULAR; INTRAVENOUS; SUBCUTANEOUS ONCE AS NEEDED
Status: DISCONTINUED | OUTPATIENT
Start: 2021-01-01 | End: 2021-01-01 | Stop reason: HOSPADM

## 2021-01-01 RX ORDER — SODIUM,POTASSIUM PHOSPHATES 280-250MG
2 POWDER IN PACKET (EA) ORAL
Status: DISCONTINUED | OUTPATIENT
Start: 2021-01-01 | End: 2022-01-01 | Stop reason: HOSPADM

## 2021-01-01 RX ORDER — SUCRALFATE 1 G/1
1 TABLET ORAL
Status: DISCONTINUED | OUTPATIENT
Start: 2021-01-01 | End: 2021-01-01 | Stop reason: HOSPADM

## 2021-01-01 RX ORDER — BUTALBITAL, ACETAMINOPHEN AND CAFFEINE 50; 325; 40 MG/1; MG/1; MG/1
TABLET ORAL
Qty: 30 TABLET | Refills: 0 | Status: SHIPPED | OUTPATIENT
Start: 2021-01-01 | End: 2022-01-01 | Stop reason: SDUPTHER

## 2021-01-01 RX ORDER — SODIUM CHLORIDE 9 MG/ML
INJECTION, SOLUTION INTRAVENOUS
Status: CANCELLED | OUTPATIENT
Start: 2021-01-01

## 2021-01-01 RX ORDER — SODIUM CHLORIDE 0.9 % (FLUSH) 0.9 %
10 SYRINGE (ML) INJECTION EVERY 8 HOURS PRN
Status: DISCONTINUED | OUTPATIENT
Start: 2021-01-01 | End: 2021-01-01 | Stop reason: HOSPADM

## 2021-01-01 RX ORDER — BUTALBITAL, ACETAMINOPHEN AND CAFFEINE 50; 325; 40 MG/1; MG/1; MG/1
1 TABLET ORAL EVERY 6 HOURS PRN
Qty: 30 TABLET | Refills: 2 | Status: SHIPPED | OUTPATIENT
Start: 2021-01-01 | End: 2021-01-01

## 2021-01-01 RX ORDER — ROCURONIUM BROMIDE 10 MG/ML
INJECTION, SOLUTION INTRAVENOUS
Status: DISCONTINUED | OUTPATIENT
Start: 2021-01-01 | End: 2021-01-01

## 2021-01-01 RX ORDER — SODIUM CHLORIDE 9 MG/ML
INJECTION, SOLUTION INTRAVENOUS ONCE
Status: COMPLETED | OUTPATIENT
Start: 2021-01-01 | End: 2021-01-01

## 2021-01-01 RX ORDER — TRIAMCINOLONE ACETONIDE 40 MG/ML
60 INJECTION, SUSPENSION INTRA-ARTICULAR; INTRAMUSCULAR ONCE
Status: COMPLETED | OUTPATIENT
Start: 2021-01-01 | End: 2021-01-01

## 2021-01-01 RX ORDER — SODIUM CHLORIDE 9 MG/ML
INJECTION, SOLUTION INTRAVENOUS ONCE
Status: DISCONTINUED | OUTPATIENT
Start: 2021-01-01 | End: 2021-01-01 | Stop reason: HOSPADM

## 2021-01-01 RX ORDER — IPRATROPIUM BROMIDE AND ALBUTEROL SULFATE 2.5; .5 MG/3ML; MG/3ML
3 SOLUTION RESPIRATORY (INHALATION)
Status: DISCONTINUED | OUTPATIENT
Start: 2021-01-01 | End: 2021-01-01

## 2021-01-01 RX ORDER — CLOPIDOGREL BISULFATE 75 MG/1
75 TABLET ORAL DAILY
Status: DISCONTINUED | OUTPATIENT
Start: 2021-01-01 | End: 2022-01-01 | Stop reason: HOSPADM

## 2021-01-01 RX ORDER — AMLODIPINE BESYLATE 5 MG/1
10 TABLET ORAL DAILY
Status: DISCONTINUED | OUTPATIENT
Start: 2021-01-01 | End: 2022-01-01 | Stop reason: HOSPADM

## 2021-01-01 RX ORDER — METOPROLOL SUCCINATE 50 MG/1
50 TABLET, EXTENDED RELEASE ORAL DAILY
Status: DISCONTINUED | OUTPATIENT
Start: 2021-01-01 | End: 2021-01-01 | Stop reason: HOSPADM

## 2021-01-01 RX ORDER — SODIUM CHLORIDE, SODIUM LACTATE, POTASSIUM CHLORIDE, CALCIUM CHLORIDE 600; 310; 30; 20 MG/100ML; MG/100ML; MG/100ML; MG/100ML
INJECTION, SOLUTION INTRAVENOUS CONTINUOUS PRN
Status: DISCONTINUED | OUTPATIENT
Start: 2021-01-01 | End: 2021-01-01

## 2021-01-01 RX ORDER — CYANOCOBALAMIN 1000 UG/ML
2000 INJECTION, SOLUTION INTRAMUSCULAR; SUBCUTANEOUS ONCE
Status: COMPLETED | OUTPATIENT
Start: 2021-01-01 | End: 2021-01-01

## 2021-01-01 RX ORDER — SODIUM CHLORIDE 0.9 % (FLUSH) 0.9 %
3 SYRINGE (ML) INJECTION EVERY 8 HOURS
Status: DISCONTINUED | OUTPATIENT
Start: 2021-01-01 | End: 2021-01-01 | Stop reason: HOSPADM

## 2021-01-01 RX ORDER — BUTALBITAL, ACETAMINOPHEN AND CAFFEINE 50; 325; 40 MG/1; MG/1; MG/1
1 TABLET ORAL EVERY 6 HOURS PRN
Qty: 30 TABLET | Refills: 0 | Status: SHIPPED | OUTPATIENT
Start: 2021-01-01 | End: 2021-01-01

## 2021-01-01 RX ORDER — BUTALBITAL, ACETAMINOPHEN AND CAFFEINE 50; 325; 40 MG/1; MG/1; MG/1
1 TABLET ORAL EVERY 6 HOURS PRN
Qty: 30 TABLET | Refills: 0 | Status: SHIPPED | OUTPATIENT
Start: 2021-01-01 | End: 2021-01-01 | Stop reason: SDUPTHER

## 2021-01-01 RX ORDER — PAROXETINE HYDROCHLORIDE 20 MG/1
20 TABLET, FILM COATED ORAL EVERY MORNING
Status: DISCONTINUED | OUTPATIENT
Start: 2021-01-01 | End: 2021-01-01 | Stop reason: HOSPADM

## 2021-01-01 RX ORDER — BENZONATATE 100 MG/1
100 CAPSULE ORAL 3 TIMES DAILY PRN
Status: DISCONTINUED | OUTPATIENT
Start: 2021-01-01 | End: 2021-01-01 | Stop reason: HOSPADM

## 2021-01-01 RX ORDER — TRAMADOL HYDROCHLORIDE 50 MG/1
1 TABLET ORAL EVERY 6 HOURS PRN
COMMUNITY
Start: 2019-03-23

## 2021-01-01 RX ORDER — LIDOCAINE HYDROCHLORIDE 20 MG/ML
JELLY TOPICAL
Status: DISCONTINUED | OUTPATIENT
Start: 2021-01-01 | End: 2021-01-01

## 2021-01-01 RX ORDER — ONDANSETRON 4 MG/1
4 TABLET, ORALLY DISINTEGRATING ORAL EVERY 8 HOURS PRN
Qty: 10 TABLET | Refills: 1 | Status: SHIPPED | OUTPATIENT
Start: 2021-01-01

## 2021-01-01 RX ORDER — TALC
6 POWDER (GRAM) TOPICAL NIGHTLY PRN
Status: DISCONTINUED | OUTPATIENT
Start: 2021-01-01 | End: 2021-01-01 | Stop reason: HOSPADM

## 2021-01-01 RX ORDER — OXYCODONE HYDROCHLORIDE 5 MG/1
5 TABLET ORAL
Status: DISCONTINUED | OUTPATIENT
Start: 2021-01-01 | End: 2021-01-01 | Stop reason: HOSPADM

## 2021-01-01 RX ORDER — ASPIRIN 81 MG/1
81 TABLET ORAL DAILY
Status: DISCONTINUED | OUTPATIENT
Start: 2021-01-01 | End: 2021-01-01 | Stop reason: HOSPADM

## 2021-01-01 RX ORDER — IPRATROPIUM BROMIDE AND ALBUTEROL SULFATE 2.5; .5 MG/3ML; MG/3ML
3 SOLUTION RESPIRATORY (INHALATION) EVERY 6 HOURS PRN
Status: DISCONTINUED | OUTPATIENT
Start: 2021-01-01 | End: 2021-01-01 | Stop reason: HOSPADM

## 2021-01-01 RX ORDER — CEFAZOLIN SODIUM 2 G/50ML
2 SOLUTION INTRAVENOUS ONCE
Status: COMPLETED | OUTPATIENT
Start: 2021-01-01 | End: 2021-01-01

## 2021-01-01 RX ORDER — CLOPIDOGREL BISULFATE 75 MG/1
75 TABLET ORAL DAILY
Qty: 30 TABLET | Refills: 11 | Status: SHIPPED | OUTPATIENT
Start: 2021-01-01 | End: 2022-12-15

## 2021-01-01 RX ORDER — ASPIRIN 81 MG/1
81 TABLET ORAL DAILY
Status: DISCONTINUED | OUTPATIENT
Start: 2021-01-01 | End: 2022-01-01 | Stop reason: HOSPADM

## 2021-01-01 RX ORDER — BUTALBITAL, ACETAMINOPHEN AND CAFFEINE 50; 325; 40 MG/1; MG/1; MG/1
1 TABLET ORAL EVERY 6 HOURS PRN
COMMUNITY
Start: 2021-01-01 | End: 2021-01-01 | Stop reason: SDUPTHER

## 2021-01-01 RX ORDER — TRAMADOL HYDROCHLORIDE 50 MG/1
50 TABLET ORAL EVERY 6 HOURS PRN
Status: DISCONTINUED | OUTPATIENT
Start: 2021-01-01 | End: 2022-01-01 | Stop reason: HOSPADM

## 2021-01-01 RX ORDER — LIDOCAINE HYDROCHLORIDE 10 MG/ML
INJECTION, SOLUTION EPIDURAL; INFILTRATION; INTRACAUDAL; PERINEURAL
Status: DISPENSED
Start: 2021-01-01 | End: 2021-01-01

## 2021-01-01 RX ORDER — BUTALBITAL, ACETAMINOPHEN AND CAFFEINE 50; 325; 40 MG/1; MG/1; MG/1
TABLET ORAL
Qty: 30 TABLET | Refills: 0 | Status: SHIPPED | OUTPATIENT
Start: 2021-01-01 | End: 2021-01-01 | Stop reason: SDUPTHER

## 2021-01-01 RX ORDER — NITROGLYCERIN 0.4 MG/1
0.4 TABLET SUBLINGUAL EVERY 5 MIN PRN
Status: DISCONTINUED | OUTPATIENT
Start: 2021-01-01 | End: 2022-01-01 | Stop reason: HOSPADM

## 2021-01-01 RX ORDER — ACETAMINOPHEN 10 MG/ML
INJECTION, SOLUTION INTRAVENOUS
Status: DISCONTINUED | OUTPATIENT
Start: 2021-01-01 | End: 2021-01-01

## 2021-01-01 RX ORDER — UMECLIDINIUM BROMIDE AND VILANTEROL TRIFENATATE 62.5; 25 UG/1; UG/1
POWDER RESPIRATORY (INHALATION)
Qty: 60 EACH | Refills: 5 | Status: SHIPPED | OUTPATIENT
Start: 2021-01-01 | End: 2022-01-01

## 2021-01-01 RX ORDER — PREDNISONE 20 MG/1
20 TABLET ORAL 2 TIMES DAILY
Qty: 10 TABLET | Refills: 0 | Status: SHIPPED | OUTPATIENT
Start: 2021-01-01 | End: 2021-01-01

## 2021-01-01 RX ORDER — MEPERIDINE HYDROCHLORIDE 50 MG/ML
12.5 INJECTION INTRAMUSCULAR; INTRAVENOUS; SUBCUTANEOUS EVERY 10 MIN PRN
Status: DISCONTINUED | OUTPATIENT
Start: 2021-01-01 | End: 2021-01-01 | Stop reason: HOSPADM

## 2021-01-01 RX ORDER — FENTANYL CITRATE 50 UG/ML
25 INJECTION, SOLUTION INTRAMUSCULAR; INTRAVENOUS
Status: DISCONTINUED | OUTPATIENT
Start: 2021-01-01 | End: 2021-01-01 | Stop reason: HOSPADM

## 2021-01-01 RX ORDER — IPRATROPIUM BROMIDE AND ALBUTEROL SULFATE 2.5; .5 MG/3ML; MG/3ML
3 SOLUTION RESPIRATORY (INHALATION) EVERY 4 HOURS PRN
Status: DISCONTINUED | OUTPATIENT
Start: 2021-01-01 | End: 2022-01-01 | Stop reason: HOSPADM

## 2021-01-01 RX ORDER — ACETAMINOPHEN 325 MG/1
650 TABLET ORAL EVERY 4 HOURS PRN
Status: DISCONTINUED | OUTPATIENT
Start: 2021-01-01 | End: 2022-01-01 | Stop reason: HOSPADM

## 2021-01-01 RX ORDER — GUAIFENESIN/DEXTROMETHORPHAN 100-10MG/5
10 SYRUP ORAL EVERY 6 HOURS
Qty: 118 ML | Refills: 0 | Status: SHIPPED | OUTPATIENT
Start: 2021-01-01 | End: 2021-01-01

## 2021-01-01 RX ORDER — ALBUMIN HUMAN 250 G/1000ML
25 SOLUTION INTRAVENOUS ONCE
Status: COMPLETED | OUTPATIENT
Start: 2021-01-01 | End: 2021-01-01

## 2021-01-01 RX ORDER — HYDROMORPHONE HYDROCHLORIDE 1 MG/ML
1 INJECTION, SOLUTION INTRAMUSCULAR; INTRAVENOUS; SUBCUTANEOUS
Status: DISCONTINUED | OUTPATIENT
Start: 2021-01-01 | End: 2021-01-01 | Stop reason: HOSPADM

## 2021-01-01 RX ORDER — HYDROMORPHONE HYDROCHLORIDE 2 MG/ML
0.2 INJECTION, SOLUTION INTRAMUSCULAR; INTRAVENOUS; SUBCUTANEOUS EVERY 5 MIN PRN
Status: DISCONTINUED | OUTPATIENT
Start: 2021-01-01 | End: 2021-01-01 | Stop reason: HOSPADM

## 2021-01-01 RX ORDER — SIMETHICONE 80 MG
1 TABLET,CHEWABLE ORAL 4 TIMES DAILY PRN
Status: DISCONTINUED | OUTPATIENT
Start: 2021-01-01 | End: 2021-01-01 | Stop reason: HOSPADM

## 2021-01-01 RX ORDER — HYDROMORPHONE HYDROCHLORIDE 1 MG/ML
INJECTION, SOLUTION INTRAMUSCULAR; INTRAVENOUS; SUBCUTANEOUS
Status: DISPENSED
Start: 2021-01-01 | End: 2021-01-01

## 2021-01-01 RX ORDER — LIDOCAINE HYDROCHLORIDE 10 MG/ML
1 INJECTION, SOLUTION EPIDURAL; INFILTRATION; INTRACAUDAL; PERINEURAL ONCE
Status: CANCELLED | OUTPATIENT
Start: 2021-01-01 | End: 2021-01-01

## 2021-01-01 RX ORDER — CLOPIDOGREL BISULFATE 75 MG/1
75 TABLET ORAL DAILY
Status: DISCONTINUED | OUTPATIENT
Start: 2021-01-01 | End: 2021-01-01

## 2021-01-01 RX ORDER — FLUTICASONE FUROATE AND VILANTEROL 100; 25 UG/1; UG/1
1 POWDER RESPIRATORY (INHALATION) DAILY
Qty: 14 EACH | Refills: 3 | Status: SHIPPED | OUTPATIENT
Start: 2021-01-01 | End: 2022-01-01

## 2021-01-01 RX ORDER — SIMETHICONE 80 MG
1 TABLET,CHEWABLE ORAL 4 TIMES DAILY PRN
Status: DISCONTINUED | OUTPATIENT
Start: 2021-01-01 | End: 2022-01-01 | Stop reason: HOSPADM

## 2021-01-01 RX ORDER — CYPROHEPTADINE HYDROCHLORIDE 4 MG/1
4 TABLET ORAL 2 TIMES DAILY
Status: DISCONTINUED | OUTPATIENT
Start: 2021-01-01 | End: 2021-01-01 | Stop reason: HOSPADM

## 2021-01-01 RX ORDER — ACETAMINOPHEN 325 MG/1
650 TABLET ORAL EVERY 8 HOURS PRN
Status: DISCONTINUED | OUTPATIENT
Start: 2021-01-01 | End: 2021-01-01 | Stop reason: HOSPADM

## 2021-01-01 RX ORDER — ASPIRIN 81 MG/1
81 TABLET ORAL DAILY
Status: DISCONTINUED | OUTPATIENT
Start: 2021-01-01 | End: 2021-01-01

## 2021-01-01 RX ORDER — NITROGLYCERIN 20 MG/100ML
INJECTION INTRAVENOUS
Status: DISCONTINUED | OUTPATIENT
Start: 2021-01-01 | End: 2021-01-01

## 2021-01-01 RX ORDER — ASPIRIN 81 MG/1
81 TABLET ORAL DAILY
Qty: 90 TABLET | Refills: 3 | Status: SHIPPED | OUTPATIENT
Start: 2021-01-01 | End: 2022-12-15

## 2021-01-01 RX ORDER — ZOSTER VACCINE RECOMBINANT, ADJUVANTED 50 MCG/0.5
0.5 KIT INTRAMUSCULAR ONCE
Qty: 1 EACH | Refills: 1 | Status: SHIPPED | OUTPATIENT
Start: 2021-01-01 | End: 2021-01-01

## 2021-01-01 RX ORDER — SODIUM CHLORIDE 9 MG/ML
INJECTION, SOLUTION INTRAVENOUS ONCE
Status: CANCELLED | OUTPATIENT
Start: 2021-01-01 | End: 2021-01-01

## 2021-01-01 RX ORDER — ATORVASTATIN CALCIUM 20 MG/1
20 TABLET, FILM COATED ORAL DAILY
Status: DISCONTINUED | OUTPATIENT
Start: 2021-01-01 | End: 2022-01-01 | Stop reason: HOSPADM

## 2021-01-01 RX ORDER — PROTAMINE SULFATE 10 MG/ML
INJECTION, SOLUTION INTRAVENOUS
Status: DISCONTINUED | OUTPATIENT
Start: 2021-01-01 | End: 2021-01-01

## 2021-01-01 RX ORDER — TALC
9 POWDER (GRAM) TOPICAL NIGHTLY PRN
Status: DISCONTINUED | OUTPATIENT
Start: 2021-01-01 | End: 2022-01-01 | Stop reason: HOSPADM

## 2021-01-01 RX ORDER — PROPOFOL 10 MG/ML
VIAL (ML) INTRAVENOUS
Status: DISCONTINUED | OUTPATIENT
Start: 2021-01-01 | End: 2021-01-01

## 2021-01-01 RX ORDER — MORPHINE SULFATE 2 MG/ML
2 INJECTION, SOLUTION INTRAMUSCULAR; INTRAVENOUS EVERY 6 HOURS PRN
Status: DISCONTINUED | OUTPATIENT
Start: 2021-01-01 | End: 2021-01-01 | Stop reason: HOSPADM

## 2021-01-01 RX ORDER — ALBUTEROL SULFATE 90 UG/1
2 AEROSOL, METERED RESPIRATORY (INHALATION) EVERY 4 HOURS PRN
Status: DISCONTINUED | OUTPATIENT
Start: 2021-01-01 | End: 2021-01-01 | Stop reason: HOSPADM

## 2021-01-01 RX ORDER — MECLIZINE HCL 12.5 MG 12.5 MG/1
12.5 TABLET ORAL 3 TIMES DAILY PRN
Qty: 30 TABLET | Refills: 0 | Status: SHIPPED | OUTPATIENT
Start: 2021-01-01

## 2021-01-01 RX ORDER — HYDROCODONE BITARTRATE AND ACETAMINOPHEN 5; 325 MG/1; MG/1
1 TABLET ORAL EVERY 4 HOURS PRN
Status: DISCONTINUED | OUTPATIENT
Start: 2021-01-01 | End: 2021-01-01 | Stop reason: HOSPADM

## 2021-01-01 RX ORDER — METOPROLOL SUCCINATE 50 MG/1
50 TABLET, EXTENDED RELEASE ORAL DAILY
Status: DISCONTINUED | OUTPATIENT
Start: 2021-01-01 | End: 2022-01-01 | Stop reason: HOSPADM

## 2021-01-01 RX ORDER — PROCHLORPERAZINE EDISYLATE 5 MG/ML
5 INJECTION INTRAMUSCULAR; INTRAVENOUS EVERY 6 HOURS PRN
Status: DISCONTINUED | OUTPATIENT
Start: 2021-01-01 | End: 2021-01-01 | Stop reason: HOSPADM

## 2021-01-01 RX ORDER — IBUPROFEN 200 MG
16 TABLET ORAL
Status: DISCONTINUED | OUTPATIENT
Start: 2021-01-01 | End: 2022-01-01 | Stop reason: HOSPADM

## 2021-01-01 RX ORDER — NALOXONE HCL 0.4 MG/ML
0.02 VIAL (ML) INJECTION
Status: DISCONTINUED | OUTPATIENT
Start: 2021-01-01 | End: 2022-01-01 | Stop reason: HOSPADM

## 2021-01-01 RX ORDER — FENTANYL CITRATE 50 UG/ML
INJECTION, SOLUTION INTRAMUSCULAR; INTRAVENOUS
Status: DISCONTINUED | OUTPATIENT
Start: 2021-01-01 | End: 2021-01-01

## 2021-01-01 RX ORDER — SODIUM CHLORIDE 0.9 % (FLUSH) 0.9 %
3 SYRINGE (ML) INJECTION
Status: DISCONTINUED | OUTPATIENT
Start: 2021-01-01 | End: 2021-01-01 | Stop reason: HOSPADM

## 2021-01-01 RX ORDER — ONDANSETRON 2 MG/ML
4 INJECTION INTRAMUSCULAR; INTRAVENOUS EVERY 6 HOURS PRN
Status: DISCONTINUED | OUTPATIENT
Start: 2021-01-01 | End: 2021-01-01 | Stop reason: HOSPADM

## 2021-01-01 RX ORDER — SODIUM CHLORIDE 9 MG/ML
INJECTION, SOLUTION INTRAVENOUS CONTINUOUS
Status: DISCONTINUED | OUTPATIENT
Start: 2021-01-01 | End: 2021-01-01

## 2021-01-01 RX ORDER — IPRATROPIUM BROMIDE AND ALBUTEROL SULFATE 2.5; .5 MG/3ML; MG/3ML
3 SOLUTION RESPIRATORY (INHALATION)
Status: DISCONTINUED | OUTPATIENT
Start: 2021-01-01 | End: 2021-01-01 | Stop reason: HOSPADM

## 2021-01-01 RX ORDER — AZITHROMYCIN 250 MG/1
TABLET, FILM COATED ORAL
Qty: 6 TABLET | Refills: 0 | Status: ON HOLD | OUTPATIENT
Start: 2021-01-01 | End: 2021-01-01 | Stop reason: HOSPADM

## 2021-01-01 RX ORDER — HYDRALAZINE HYDROCHLORIDE 20 MG/ML
10 INJECTION INTRAMUSCULAR; INTRAVENOUS EVERY 8 HOURS PRN
Status: DISCONTINUED | OUTPATIENT
Start: 2021-01-01 | End: 2021-01-01 | Stop reason: HOSPADM

## 2021-01-01 RX ORDER — ONDANSETRON 2 MG/ML
4 INJECTION INTRAMUSCULAR; INTRAVENOUS EVERY 12 HOURS PRN
Status: DISCONTINUED | OUTPATIENT
Start: 2021-01-01 | End: 2021-01-01 | Stop reason: HOSPADM

## 2021-01-01 RX ORDER — IPRATROPIUM BROMIDE AND ALBUTEROL SULFATE 2.5; .5 MG/3ML; MG/3ML
SOLUTION RESPIRATORY (INHALATION)
Status: DISPENSED
Start: 2021-01-01 | End: 2021-01-01

## 2021-01-01 RX ORDER — IBUPROFEN 200 MG
24 TABLET ORAL
Status: DISCONTINUED | OUTPATIENT
Start: 2021-01-01 | End: 2022-01-01 | Stop reason: HOSPADM

## 2021-01-01 RX ORDER — HEPARIN SODIUM 10000 [USP'U]/ML
INJECTION, SOLUTION INTRAVENOUS; SUBCUTANEOUS
Status: DISCONTINUED | OUTPATIENT
Start: 2021-01-01 | End: 2021-01-01

## 2021-01-01 RX ORDER — SODIUM CHLORIDE 0.9 % (FLUSH) 0.9 %
10 SYRINGE (ML) INJECTION
Status: DISCONTINUED | OUTPATIENT
Start: 2021-01-01 | End: 2021-01-01

## 2021-01-01 RX ORDER — SODIUM CHLORIDE 0.9 % (FLUSH) 0.9 %
10 SYRINGE (ML) INJECTION EVERY 8 HOURS PRN
Status: DISCONTINUED | OUTPATIENT
Start: 2021-01-01 | End: 2022-01-01 | Stop reason: HOSPADM

## 2021-01-01 RX ORDER — LIDOCAINE HYDROCHLORIDE 10 MG/ML
1 INJECTION, SOLUTION EPIDURAL; INFILTRATION; INTRACAUDAL; PERINEURAL ONCE AS NEEDED
Status: CANCELLED | OUTPATIENT
Start: 2021-01-01 | End: 2033-05-27

## 2021-01-01 RX ORDER — AMLODIPINE BESYLATE 5 MG/1
10 TABLET ORAL DAILY
Status: DISCONTINUED | OUTPATIENT
Start: 2021-01-01 | End: 2021-01-01 | Stop reason: HOSPADM

## 2021-01-01 RX ORDER — HYDRALAZINE HYDROCHLORIDE 25 MG/1
25 TABLET, FILM COATED ORAL 2 TIMES DAILY
Status: DISCONTINUED | OUTPATIENT
Start: 2021-01-01 | End: 2022-01-01 | Stop reason: HOSPADM

## 2021-01-01 RX ORDER — LIDOCAINE HYDROCHLORIDE 20 MG/ML
INJECTION INTRAVENOUS
Status: DISCONTINUED | OUTPATIENT
Start: 2021-01-01 | End: 2021-01-01

## 2021-01-01 RX ORDER — EPHEDRINE SULFATE 50 MG/ML
INJECTION, SOLUTION INTRAVENOUS
Status: DISCONTINUED | OUTPATIENT
Start: 2021-01-01 | End: 2021-01-01

## 2021-01-01 RX ORDER — HEPARIN SODIUM 5000 [USP'U]/ML
INJECTION, SOLUTION INTRAVENOUS; SUBCUTANEOUS
Status: DISCONTINUED | OUTPATIENT
Start: 2021-01-01 | End: 2021-01-01 | Stop reason: HOSPADM

## 2021-01-01 RX ORDER — MUPIROCIN 20 MG/G
OINTMENT TOPICAL 2 TIMES DAILY
Status: DISCONTINUED | OUTPATIENT
Start: 2021-01-01 | End: 2022-01-01 | Stop reason: HOSPADM

## 2021-01-01 RX ORDER — FENTANYL CITRATE 50 UG/ML
25 INJECTION, SOLUTION INTRAMUSCULAR; INTRAVENOUS EVERY 5 MIN PRN
Status: COMPLETED | OUTPATIENT
Start: 2021-01-01 | End: 2021-01-01

## 2021-01-01 RX ORDER — FAMOTIDINE 10 MG/ML
INJECTION INTRAVENOUS
Status: DISCONTINUED | OUTPATIENT
Start: 2021-01-01 | End: 2021-01-01

## 2021-01-01 RX ORDER — HYDRALAZINE HYDROCHLORIDE 20 MG/ML
5 INJECTION INTRAMUSCULAR; INTRAVENOUS ONCE
Status: COMPLETED | OUTPATIENT
Start: 2021-01-01 | End: 2021-01-01

## 2021-01-01 RX ORDER — ZOSTER VACCINE RECOMBINANT, ADJUVANTED 50 MCG/0.5
0.5 KIT INTRAMUSCULAR ONCE
Qty: 1 EACH | Refills: 0 | Status: ON HOLD | OUTPATIENT
Start: 2021-01-01 | End: 2021-01-01 | Stop reason: HOSPADM

## 2021-01-01 RX ADMIN — CYPROHEPTADINE HYDROCHLORIDE 4 MG: 4 TABLET ORAL at 01:12

## 2021-01-01 RX ADMIN — CYPROHEPTADINE HYDROCHLORIDE 4 MG: 4 TABLET ORAL at 11:12

## 2021-01-01 RX ADMIN — EPHEDRINE SULFATE 10 MG: 50 INJECTION, SOLUTION INTRAMUSCULAR; INTRAVENOUS; SUBCUTANEOUS at 12:12

## 2021-01-01 RX ADMIN — CYPROHEPTADINE HYDROCHLORIDE 4 MG: 4 TABLET ORAL at 08:12

## 2021-01-01 RX ADMIN — FENTANYL CITRATE 25 MCG: 50 INJECTION INTRAMUSCULAR; INTRAVENOUS at 01:06

## 2021-01-01 RX ADMIN — KETOROLAC TROMETHAMINE 30 MG: 30 INJECTION, SOLUTION INTRAMUSCULAR at 08:12

## 2021-01-01 RX ADMIN — ATORVASTATIN CALCIUM 20 MG: 20 TABLET, FILM COATED ORAL at 08:06

## 2021-01-01 RX ADMIN — AMLODIPINE BESYLATE 10 MG: 5 TABLET ORAL at 08:12

## 2021-01-01 RX ADMIN — PHENYLEPHRINE HYDROCHLORIDE 40 MCG: 10 INJECTION INTRAVENOUS at 02:06

## 2021-01-01 RX ADMIN — IOHEXOL 75 ML: 350 INJECTION, SOLUTION INTRAVENOUS at 04:12

## 2021-01-01 RX ADMIN — CYPROHEPTADINE HYDROCHLORIDE 4 MG: 4 TABLET ORAL at 09:12

## 2021-01-01 RX ADMIN — ONDANSETRON 4 MG: 2 INJECTION INTRAMUSCULAR; INTRAVENOUS at 02:12

## 2021-01-01 RX ADMIN — HYDROMORPHONE HYDROCHLORIDE 1 MG: 1 INJECTION, SOLUTION INTRAMUSCULAR; INTRAVENOUS; SUBCUTANEOUS at 02:06

## 2021-01-01 RX ADMIN — SODIUM CHLORIDE: 900 INJECTION INTRAVENOUS at 01:06

## 2021-01-01 RX ADMIN — HYDROCODONE BITARTRATE AND ACETAMINOPHEN 1 TABLET: 5; 325 TABLET ORAL at 11:06

## 2021-01-01 RX ADMIN — FENTANYL CITRATE 25 MCG: 50 INJECTION INTRAMUSCULAR; INTRAVENOUS at 03:06

## 2021-01-01 RX ADMIN — HYDRALAZINE HYDROCHLORIDE 25 MG: 25 TABLET, FILM COATED ORAL at 10:12

## 2021-01-01 RX ADMIN — ASPIRIN 81 MG: 81 TABLET, DELAYED RELEASE ORAL at 08:06

## 2021-01-01 RX ADMIN — ROCURONIUM BROMIDE 30 MG: 10 INJECTION, SOLUTION INTRAVENOUS at 12:12

## 2021-01-01 RX ADMIN — MELATONIN TAB 3 MG 9 MG: 3 TAB at 10:12

## 2021-01-01 RX ADMIN — FLUTICASONE FUROATE AND VILANTEROL TRIFENATATE 1 PUFF: 100; 25 POWDER RESPIRATORY (INHALATION) at 08:12

## 2021-01-01 RX ADMIN — ROCURONIUM BROMIDE 15 MG: 10 INJECTION, SOLUTION INTRAVENOUS at 02:06

## 2021-01-01 RX ADMIN — TRAMADOL HYDROCHLORIDE 50 MG: 50 TABLET ORAL at 10:12

## 2021-01-01 RX ADMIN — MUPIROCIN: 20 OINTMENT TOPICAL at 10:12

## 2021-01-01 RX ADMIN — LIDOCAINE HYDROCHLORIDE 60 MG: 20 INJECTION, SOLUTION INTRAVENOUS at 01:06

## 2021-01-01 RX ADMIN — FENTANYL CITRATE 25 MCG: 50 INJECTION INTRAMUSCULAR; INTRAVENOUS at 02:12

## 2021-01-01 RX ADMIN — PHENYLEPHRINE HYDROCHLORIDE 0.2 MCG/KG/MIN: 10 INJECTION INTRAVENOUS at 12:12

## 2021-01-01 RX ADMIN — HYDRALAZINE HYDROCHLORIDE 25 MG: 25 TABLET, FILM COATED ORAL at 08:12

## 2021-01-01 RX ADMIN — SUCRALFATE 1 G: 1 TABLET ORAL at 09:12

## 2021-01-01 RX ADMIN — METOPROLOL SUCCINATE 50 MG: 50 TABLET, EXTENDED RELEASE ORAL at 09:12

## 2021-01-01 RX ADMIN — METOPROLOL SUCCINATE 50 MG: 50 TABLET, FILM COATED, EXTENDED RELEASE ORAL at 08:06

## 2021-01-01 RX ADMIN — HYDRALAZINE HYDROCHLORIDE 5 MG: 20 INJECTION INTRAMUSCULAR; INTRAVENOUS at 04:12

## 2021-01-01 RX ADMIN — HYDROMORPHONE HYDROCHLORIDE 1 MG: 1 INJECTION, SOLUTION INTRAMUSCULAR; INTRAVENOUS; SUBCUTANEOUS at 11:06

## 2021-01-01 RX ADMIN — ONDANSETRON 4 MG: 2 INJECTION INTRAMUSCULAR; INTRAVENOUS at 01:06

## 2021-01-01 RX ADMIN — ETOMIDATE 10 MG: 2 INJECTION INTRAVENOUS at 12:12

## 2021-01-01 RX ADMIN — FENTANYL CITRATE 25 MCG: 50 INJECTION INTRAMUSCULAR; INTRAVENOUS at 01:12

## 2021-01-01 RX ADMIN — ATORVASTATIN CALCIUM 20 MG: 20 TABLET, FILM COATED ORAL at 09:12

## 2021-01-01 RX ADMIN — ACETAMINOPHEN 650 MG: 325 TABLET ORAL at 04:12

## 2021-01-01 RX ADMIN — ASPIRIN 81 MG: 81 TABLET, COATED ORAL at 12:12

## 2021-01-01 RX ADMIN — DEXAMETHASONE SODIUM PHOSPHATE 4 MG: 4 INJECTION, SOLUTION INTRAMUSCULAR; INTRAVENOUS at 02:06

## 2021-01-01 RX ADMIN — SUCRALFATE 1 G: 1 TABLET ORAL at 11:12

## 2021-01-01 RX ADMIN — ACETAMINOPHEN 1000 MG: 10 INJECTION, SOLUTION INTRAVENOUS at 02:06

## 2021-01-01 RX ADMIN — MUPIROCIN: 20 OINTMENT TOPICAL at 08:12

## 2021-01-01 RX ADMIN — TRIAMCINOLONE ACETONIDE 40 MG: 40 INJECTION, SUSPENSION INTRA-ARTICULAR; INTRAMUSCULAR at 03:09

## 2021-01-01 RX ADMIN — HYDROMORPHONE HYDROCHLORIDE 1 MG: 1 INJECTION, SOLUTION INTRAMUSCULAR; INTRAVENOUS; SUBCUTANEOUS at 06:06

## 2021-01-01 RX ADMIN — SODIUM CHLORIDE: 0.9 INJECTION, SOLUTION INTRAVENOUS at 10:12

## 2021-01-01 RX ADMIN — PHENYLEPHRINE HYDROCHLORIDE 100 MCG: 10 INJECTION INTRAVENOUS at 12:12

## 2021-01-01 RX ADMIN — PROPOFOL 50 MG: 10 INJECTION, EMULSION INTRAVENOUS at 02:06

## 2021-01-01 RX ADMIN — LIDOCAINE HYDROCHLORIDE 3 ML: 40 SOLUTION TOPICAL at 02:06

## 2021-01-01 RX ADMIN — MUPIROCIN: 20 OINTMENT TOPICAL at 11:12

## 2021-01-01 RX ADMIN — EPHEDRINE SULFATE 15 MG: 50 INJECTION, SOLUTION INTRAMUSCULAR; INTRAVENOUS; SUBCUTANEOUS at 12:12

## 2021-01-01 RX ADMIN — CLEVIPIDINE 5 MG/HR: 0.5 EMULSION INTRAVENOUS at 05:06

## 2021-01-01 RX ADMIN — SUCRALFATE 1 G: 1 TABLET ORAL at 06:12

## 2021-01-01 RX ADMIN — IPRATROPIUM BROMIDE AND ALBUTEROL SULFATE 3 ML: .5; 3 SOLUTION RESPIRATORY (INHALATION) at 08:06

## 2021-01-01 RX ADMIN — HYDROMORPHONE HYDROCHLORIDE 1 MG: 1 INJECTION, SOLUTION INTRAMUSCULAR; INTRAVENOUS; SUBCUTANEOUS at 07:06

## 2021-01-01 RX ADMIN — METOPROLOL SUCCINATE 50 MG: 50 TABLET, EXTENDED RELEASE ORAL at 10:12

## 2021-01-01 RX ADMIN — MUPIROCIN: 20 OINTMENT TOPICAL at 09:12

## 2021-01-01 RX ADMIN — HYDROMORPHONE HYDROCHLORIDE 1 MG: 1 INJECTION, SOLUTION INTRAMUSCULAR; INTRAVENOUS; SUBCUTANEOUS at 04:06

## 2021-01-01 RX ADMIN — ETOMIDATE 8 MG: 2 INJECTION, SOLUTION INTRAVENOUS at 01:06

## 2021-01-01 RX ADMIN — LIDOCAINE HYDROCHLORIDE 3 ML: 20 JELLY TOPICAL at 02:06

## 2021-01-01 RX ADMIN — ATORVASTATIN CALCIUM 20 MG: 20 TABLET, FILM COATED ORAL at 10:12

## 2021-01-01 RX ADMIN — CLOPIDOGREL 75 MG: 75 TABLET, FILM COATED ORAL at 08:12

## 2021-01-01 RX ADMIN — NITROGLYCERIN 40 MCG: 20 INJECTION INTRAVENOUS at 03:06

## 2021-01-01 RX ADMIN — NITROGLYCERIN 80 MCG: 20 INJECTION INTRAVENOUS at 03:06

## 2021-01-01 RX ADMIN — MUPIROCIN 1 G: 20 OINTMENT TOPICAL at 08:06

## 2021-01-01 RX ADMIN — FENTANYL CITRATE 50 MCG: 50 INJECTION INTRAMUSCULAR; INTRAVENOUS at 03:06

## 2021-01-01 RX ADMIN — LIDOCAINE HYDROCHLORIDE 60 MG: 20 INJECTION, SOLUTION INTRAVENOUS at 12:12

## 2021-01-01 RX ADMIN — FLUTICASONE FUROATE AND VILANTEROL TRIFENATATE 1 PUFF: 100; 25 POWDER RESPIRATORY (INHALATION) at 07:12

## 2021-01-01 RX ADMIN — TRIAMCINOLONE ACETONIDE 40 MG: 40 INJECTION, SUSPENSION INTRA-ARTICULAR; INTRAMUSCULAR at 03:10

## 2021-01-01 RX ADMIN — ASPIRIN 81 MG: 81 TABLET, COATED ORAL at 08:12

## 2021-01-01 RX ADMIN — METOPROLOL SUCCINATE 50 MG: 50 TABLET, EXTENDED RELEASE ORAL at 08:12

## 2021-01-01 RX ADMIN — PROPOFOL 50 MG: 10 INJECTION, EMULSION INTRAVENOUS at 12:12

## 2021-01-01 RX ADMIN — PROTAMINE SULFATE 50 MG: 10 INJECTION, SOLUTION INTRAVENOUS at 02:06

## 2021-01-01 RX ADMIN — OXYCODONE 5 MG: 5 TABLET ORAL at 01:12

## 2021-01-01 RX ADMIN — HYDRALAZINE HYDROCHLORIDE 25 MG: 25 TABLET, FILM COATED ORAL at 11:12

## 2021-01-01 RX ADMIN — ACETAMINOPHEN 650 MG: 325 TABLET ORAL at 06:12

## 2021-01-01 RX ADMIN — CYPROHEPTADINE HYDROCHLORIDE 4 MG: 4 TABLET ORAL at 10:12

## 2021-01-01 RX ADMIN — ROCURONIUM BROMIDE 5 MG: 10 INJECTION, SOLUTION INTRAVENOUS at 01:06

## 2021-01-01 RX ADMIN — SUCCINYLCHOLINE CHLORIDE 100 MG: 20 INJECTION, SOLUTION INTRAMUSCULAR; INTRAVENOUS at 01:06

## 2021-01-01 RX ADMIN — HEPARIN SODIUM 5000 UNITS: 10000 INJECTION, SOLUTION INTRAVENOUS; SUBCUTANEOUS at 02:06

## 2021-01-01 RX ADMIN — CYANOCOBALAMIN 2000 MCG: 1000 INJECTION, SOLUTION INTRAMUSCULAR; SUBCUTANEOUS at 12:05

## 2021-01-01 RX ADMIN — ALBUMIN (HUMAN) 25 G: 0.25 INJECTION, SOLUTION INTRAVENOUS at 04:12

## 2021-01-01 RX ADMIN — Medication 6 MG: at 09:12

## 2021-01-01 RX ADMIN — ATORVASTATIN CALCIUM 20 MG: 20 TABLET, FILM COATED ORAL at 08:12

## 2021-01-01 RX ADMIN — PAROXETINE HYDROCHLORIDE 20 MG: 20 TABLET, FILM COATED ORAL at 06:12

## 2021-01-01 RX ADMIN — PHENYLEPHRINE HYDROCHLORIDE 20 MCG/MIN: 10 INJECTION INTRAVENOUS at 02:06

## 2021-01-01 RX ADMIN — FENTANYL CITRATE 50 MCG: 50 INJECTION INTRAMUSCULAR; INTRAVENOUS at 12:06

## 2021-01-01 RX ADMIN — FENTANYL CITRATE 25 MCG: 50 INJECTION INTRAMUSCULAR; INTRAVENOUS at 12:06

## 2021-01-01 RX ADMIN — CLEVIPIDINE 1 MG/HR: 0.5 EMULSION INTRAVENOUS at 02:06

## 2021-01-01 RX ADMIN — FAMOTIDINE 20 MG: 10 INJECTION, SOLUTION INTRAVENOUS at 02:06

## 2021-01-01 RX ADMIN — IPRATROPIUM BROMIDE AND ALBUTEROL SULFATE 3 ML: .5; 3 SOLUTION RESPIRATORY (INHALATION) at 07:06

## 2021-01-01 RX ADMIN — IPRATROPIUM BROMIDE AND ALBUTEROL SULFATE 3 ML: .5; 3 SOLUTION RESPIRATORY (INHALATION) at 01:06

## 2021-01-01 RX ADMIN — AMLODIPINE BESYLATE 10 MG: 5 TABLET ORAL at 10:12

## 2021-01-01 RX ADMIN — GUAIFENESIN AND DEXTROMETHORPHAN 10 ML: 100; 10 SYRUP ORAL at 11:12

## 2021-01-01 RX ADMIN — TRIAMCINOLONE ACETONIDE 60 MG: 40 INJECTION, SUSPENSION INTRA-ARTICULAR; INTRAMUSCULAR at 04:08

## 2021-01-01 RX ADMIN — SUGAMMADEX 100 MG: 100 INJECTION, SOLUTION INTRAVENOUS at 12:12

## 2021-01-01 RX ADMIN — CEFAZOLIN SODIUM 2 G: 2 SOLUTION INTRAVENOUS at 01:06

## 2021-01-01 RX ADMIN — IPRATROPIUM BROMIDE AND ALBUTEROL SULFATE 3 ML: .5; 3 SOLUTION RESPIRATORY (INHALATION) at 06:06

## 2021-01-01 RX ADMIN — SODIUM CHLORIDE: 0.9 INJECTION, SOLUTION INTRAVENOUS at 11:12

## 2021-01-01 RX ADMIN — FENTANYL CITRATE 50 MCG: 50 INJECTION, SOLUTION INTRAMUSCULAR; INTRAVENOUS at 12:12

## 2021-01-01 RX ADMIN — MUPIROCIN 1 G: 20 OINTMENT TOPICAL at 09:06

## 2021-01-01 RX ADMIN — CLOPIDOGREL BISULFATE 75 MG: 75 TABLET, FILM COATED ORAL at 12:12

## 2021-01-01 RX ADMIN — SUGAMMADEX 200 MG: 100 INJECTION, SOLUTION INTRAVENOUS at 03:06

## 2021-01-01 RX ADMIN — PROPOFOL 50 MG: 10 INJECTION, EMULSION INTRAVENOUS at 01:06

## 2021-01-01 RX ADMIN — HYDROCODONE BITARTRATE AND ACETAMINOPHEN 1 TABLET: 5; 325 TABLET ORAL at 07:06

## 2021-01-01 RX ADMIN — HYDROCODONE BITARTRATE AND ACETAMINOPHEN 1 TABLET: 5; 325 TABLET ORAL at 05:06

## 2021-01-01 RX ADMIN — TRAMADOL HYDROCHLORIDE 50 MG: 50 TABLET ORAL at 12:12

## 2021-01-01 RX ADMIN — CEFAZOLIN SODIUM 2 G: 2 SOLUTION INTRAVENOUS at 12:12

## 2021-01-01 RX ADMIN — AMLODIPINE BESYLATE 10 MG: 5 TABLET ORAL at 09:06

## 2021-01-01 RX ADMIN — TRAMADOL HYDROCHLORIDE 50 MG: 50 TABLET ORAL at 05:12

## 2021-01-01 RX ADMIN — AMLODIPINE BESYLATE 10 MG: 5 TABLET ORAL at 08:06

## 2021-01-12 ENCOUNTER — IMMUNIZATION (OUTPATIENT)
Dept: PRIMARY CARE CLINIC | Facility: CLINIC | Age: 71
End: 2021-01-12
Payer: MEDICARE

## 2021-01-12 DIAGNOSIS — Z23 NEED FOR VACCINATION: ICD-10-CM

## 2021-01-12 PROCEDURE — 91300 COVID-19, MRNA, LNP-S, PF, 30 MCG/0.3 ML DOSE VACCINE: CPT | Mod: PBBFAC,PN

## 2021-01-19 ENCOUNTER — OFFICE VISIT (OUTPATIENT)
Dept: PRIMARY CARE CLINIC | Facility: CLINIC | Age: 71
End: 2021-01-19
Payer: MEDICARE

## 2021-01-19 VITALS
OXYGEN SATURATION: 97 % | HEIGHT: 61 IN | TEMPERATURE: 98 F | SYSTOLIC BLOOD PRESSURE: 136 MMHG | RESPIRATION RATE: 18 BRPM | HEART RATE: 80 BPM | WEIGHT: 105.69 LBS | DIASTOLIC BLOOD PRESSURE: 52 MMHG | BODY MASS INDEX: 19.95 KG/M2

## 2021-01-19 DIAGNOSIS — R76.8 HEPATITIS C ANTIBODY POSITIVE IN BLOOD: ICD-10-CM

## 2021-01-19 DIAGNOSIS — J44.9 CHRONIC OBSTRUCTIVE PULMONARY DISEASE, UNSPECIFIED COPD TYPE: ICD-10-CM

## 2021-01-19 DIAGNOSIS — E44.1 MALNUTRITION OF MILD DEGREE: ICD-10-CM

## 2021-01-19 DIAGNOSIS — J90 PLEURAL EFFUSION: Primary | ICD-10-CM

## 2021-01-19 DIAGNOSIS — Z99.2 END-STAGE RENAL DISEASE ON HEMODIALYSIS: ICD-10-CM

## 2021-01-19 DIAGNOSIS — Z72.0 TOBACCO ABUSE: ICD-10-CM

## 2021-01-19 DIAGNOSIS — N18.6 END-STAGE RENAL DISEASE ON HEMODIALYSIS: ICD-10-CM

## 2021-01-19 DIAGNOSIS — T82.590D MECHANICAL COMPLICATION OF ARTERIOVENOUS FISTULA SURGICALLY CREATED, SUBSEQUENT ENCOUNTER: ICD-10-CM

## 2021-01-19 DIAGNOSIS — I73.9 PAD (PERIPHERAL ARTERY DISEASE): ICD-10-CM

## 2021-01-19 DIAGNOSIS — I50.9 CHRONIC CONGESTIVE HEART FAILURE, UNSPECIFIED HEART FAILURE TYPE: ICD-10-CM

## 2021-01-19 PROCEDURE — 3288F PR FALLS RISK ASSESSMENT DOCUMENTED: ICD-10-PCS | Mod: S$GLB,,, | Performed by: INTERNAL MEDICINE

## 2021-01-19 PROCEDURE — 1125F PR PAIN SEVERITY QUANTIFIED, PAIN PRESENT: ICD-10-PCS | Mod: S$GLB,,, | Performed by: INTERNAL MEDICINE

## 2021-01-19 PROCEDURE — 99999 PR PBB SHADOW E&M-EST. PATIENT-LVL V: CPT | Mod: PBBFAC,,, | Performed by: INTERNAL MEDICINE

## 2021-01-19 PROCEDURE — 3008F PR BODY MASS INDEX (BMI) DOCUMENTED: ICD-10-PCS | Mod: S$GLB,,, | Performed by: INTERNAL MEDICINE

## 2021-01-19 PROCEDURE — 1125F AMNT PAIN NOTED PAIN PRSNT: CPT | Mod: S$GLB,,, | Performed by: INTERNAL MEDICINE

## 2021-01-19 PROCEDURE — 1101F PT FALLS ASSESS-DOCD LE1/YR: CPT | Mod: S$GLB,,, | Performed by: INTERNAL MEDICINE

## 2021-01-19 PROCEDURE — 1159F PR MEDICATION LIST DOCUMENTED IN MEDICAL RECORD: ICD-10-PCS | Mod: S$GLB,,, | Performed by: INTERNAL MEDICINE

## 2021-01-19 PROCEDURE — 1101F PR PT FALLS ASSESS DOC 0-1 FALLS W/OUT INJ PAST YR: ICD-10-PCS | Mod: S$GLB,,, | Performed by: INTERNAL MEDICINE

## 2021-01-19 PROCEDURE — 99999 PR PBB SHADOW E&M-EST. PATIENT-LVL V: ICD-10-PCS | Mod: PBBFAC,,, | Performed by: INTERNAL MEDICINE

## 2021-01-19 PROCEDURE — 3008F BODY MASS INDEX DOCD: CPT | Mod: S$GLB,,, | Performed by: INTERNAL MEDICINE

## 2021-01-19 PROCEDURE — 1159F MED LIST DOCD IN RCRD: CPT | Mod: S$GLB,,, | Performed by: INTERNAL MEDICINE

## 2021-01-19 PROCEDURE — 3288F FALL RISK ASSESSMENT DOCD: CPT | Mod: S$GLB,,, | Performed by: INTERNAL MEDICINE

## 2021-01-19 PROCEDURE — 99213 OFFICE O/P EST LOW 20 MIN: CPT | Mod: S$GLB,,, | Performed by: INTERNAL MEDICINE

## 2021-01-19 PROCEDURE — 99213 PR OFFICE/OUTPT VISIT, EST, LEVL III, 20-29 MIN: ICD-10-PCS | Mod: S$GLB,,, | Performed by: INTERNAL MEDICINE

## 2021-01-19 RX ORDER — SODIUM BICARBONATE 650 MG/1
2 TABLET ORAL 2 TIMES DAILY
COMMUNITY
Start: 2020-05-10

## 2021-01-19 RX ORDER — AZITHROMYCIN 250 MG/1
TABLET, FILM COATED ORAL
Qty: 6 TABLET | Refills: 0 | Status: SHIPPED | OUTPATIENT
Start: 2021-01-19 | End: 2021-01-23

## 2021-01-21 ENCOUNTER — TELEPHONE (OUTPATIENT)
Dept: PRIMARY CARE CLINIC | Facility: CLINIC | Age: 71
End: 2021-01-21

## 2021-01-21 DIAGNOSIS — J90 PLEURAL EFFUSION: Primary | ICD-10-CM

## 2021-01-27 ENCOUNTER — OFFICE VISIT (OUTPATIENT)
Dept: PRIMARY CARE CLINIC | Facility: CLINIC | Age: 71
End: 2021-01-27
Payer: MEDICARE

## 2021-01-27 DIAGNOSIS — J90 PLEURAL EFFUSION: Primary | ICD-10-CM

## 2021-01-27 DIAGNOSIS — J18.9 PNEUMONIA DUE TO INFECTIOUS ORGANISM, UNSPECIFIED LATERALITY, UNSPECIFIED PART OF LUNG: ICD-10-CM

## 2021-01-27 DIAGNOSIS — J44.9 CHRONIC OBSTRUCTIVE PULMONARY DISEASE, UNSPECIFIED COPD TYPE: ICD-10-CM

## 2021-01-27 PROCEDURE — 1159F PR MEDICATION LIST DOCUMENTED IN MEDICAL RECORD: ICD-10-PCS | Mod: ,,, | Performed by: INTERNAL MEDICINE

## 2021-01-27 PROCEDURE — 1159F MED LIST DOCD IN RCRD: CPT | Mod: ,,, | Performed by: INTERNAL MEDICINE

## 2021-01-27 PROCEDURE — 99213 OFFICE O/P EST LOW 20 MIN: CPT | Mod: 95,,, | Performed by: INTERNAL MEDICINE

## 2021-01-27 PROCEDURE — 99213 PR OFFICE/OUTPT VISIT, EST, LEVL III, 20-29 MIN: ICD-10-PCS | Mod: 95,,, | Performed by: INTERNAL MEDICINE

## 2021-01-28 ENCOUNTER — CLINICAL SUPPORT (OUTPATIENT)
Dept: PRIMARY CARE CLINIC | Facility: CLINIC | Age: 71
End: 2021-01-28
Payer: MEDICARE

## 2021-01-28 VITALS
HEIGHT: 61 IN | BODY MASS INDEX: 19.63 KG/M2 | WEIGHT: 104 LBS | SYSTOLIC BLOOD PRESSURE: 148 MMHG | RESPIRATION RATE: 18 BRPM | HEART RATE: 80 BPM | TEMPERATURE: 98 F | OXYGEN SATURATION: 95 % | DIASTOLIC BLOOD PRESSURE: 60 MMHG

## 2021-01-28 DIAGNOSIS — J18.9 PNEUMONIA DUE TO INFECTIOUS ORGANISM, UNSPECIFIED LATERALITY, UNSPECIFIED PART OF LUNG: Primary | ICD-10-CM

## 2021-01-28 PROCEDURE — 99999 PR PBB SHADOW E&M-EST. PATIENT-LVL IV: ICD-10-PCS | Mod: PBBFAC,,,

## 2021-01-28 PROCEDURE — 99999 PR PBB SHADOW E&M-EST. PATIENT-LVL IV: CPT | Mod: PBBFAC,,,

## 2021-01-28 RX ORDER — AZITHROMYCIN 250 MG/1
TABLET, FILM COATED ORAL
Qty: 6 TABLET | Refills: 0 | Status: SHIPPED | OUTPATIENT
Start: 2021-01-28 | End: 2021-02-01

## 2021-02-01 ENCOUNTER — IMMUNIZATION (OUTPATIENT)
Dept: PRIMARY CARE CLINIC | Facility: CLINIC | Age: 71
End: 2021-02-01
Payer: MEDICARE

## 2021-02-01 DIAGNOSIS — Z23 NEED FOR VACCINATION: Primary | ICD-10-CM

## 2021-02-01 PROCEDURE — 91300 COVID-19, MRNA, LNP-S, PF, 30 MCG/0.3 ML DOSE VACCINE: CPT | Mod: PBBFAC | Performed by: EMERGENCY MEDICINE

## 2021-02-01 PROCEDURE — 0002A COVID-19, MRNA, LNP-S, PF, 30 MCG/0.3 ML DOSE VACCINE: CPT | Mod: PBBFAC | Performed by: EMERGENCY MEDICINE

## 2021-02-05 ENCOUNTER — PATIENT OUTREACH (OUTPATIENT)
Dept: ADMINISTRATIVE | Facility: OTHER | Age: 71
End: 2021-02-05

## 2021-02-18 ENCOUNTER — OFFICE VISIT (OUTPATIENT)
Dept: PRIMARY CARE CLINIC | Facility: CLINIC | Age: 71
End: 2021-02-18
Payer: MEDICARE

## 2021-02-18 VITALS
TEMPERATURE: 98 F | SYSTOLIC BLOOD PRESSURE: 118 MMHG | RESPIRATION RATE: 18 BRPM | HEIGHT: 61 IN | DIASTOLIC BLOOD PRESSURE: 48 MMHG | HEART RATE: 59 BPM | WEIGHT: 99.75 LBS | BODY MASS INDEX: 18.83 KG/M2 | OXYGEN SATURATION: 98 %

## 2021-02-18 DIAGNOSIS — R51.9 HEADACHE, UNSPECIFIED HEADACHE TYPE: ICD-10-CM

## 2021-02-18 DIAGNOSIS — S13.9XXA NECK SPRAIN, INITIAL ENCOUNTER: ICD-10-CM

## 2021-02-18 DIAGNOSIS — G89.29 CHRONIC MIDLINE LOW BACK PAIN WITHOUT SCIATICA: ICD-10-CM

## 2021-02-18 DIAGNOSIS — I65.23 BILATERAL CAROTID ARTERY STENOSIS: ICD-10-CM

## 2021-02-18 DIAGNOSIS — M54.50 CHRONIC MIDLINE LOW BACK PAIN WITHOUT SCIATICA: ICD-10-CM

## 2021-02-18 DIAGNOSIS — J44.9 CHRONIC OBSTRUCTIVE PULMONARY DISEASE, UNSPECIFIED COPD TYPE: ICD-10-CM

## 2021-02-18 DIAGNOSIS — M25.531 PAIN IN BOTH WRISTS: Primary | ICD-10-CM

## 2021-02-18 DIAGNOSIS — M25.532 PAIN IN BOTH WRISTS: Primary | ICD-10-CM

## 2021-02-18 PROCEDURE — 3288F FALL RISK ASSESSMENT DOCD: CPT | Mod: S$GLB,,, | Performed by: INTERNAL MEDICINE

## 2021-02-18 PROCEDURE — 1125F AMNT PAIN NOTED PAIN PRSNT: CPT | Mod: S$GLB,,, | Performed by: INTERNAL MEDICINE

## 2021-02-18 PROCEDURE — 3288F PR FALLS RISK ASSESSMENT DOCUMENTED: ICD-10-PCS | Mod: S$GLB,,, | Performed by: INTERNAL MEDICINE

## 2021-02-18 PROCEDURE — 1101F PT FALLS ASSESS-DOCD LE1/YR: CPT | Mod: S$GLB,,, | Performed by: INTERNAL MEDICINE

## 2021-02-18 PROCEDURE — 99213 PR OFFICE/OUTPT VISIT, EST, LEVL III, 20-29 MIN: ICD-10-PCS | Mod: 25,S$GLB,, | Performed by: INTERNAL MEDICINE

## 2021-02-18 PROCEDURE — 3008F BODY MASS INDEX DOCD: CPT | Mod: S$GLB,,, | Performed by: INTERNAL MEDICINE

## 2021-02-18 PROCEDURE — 99999 PR PBB SHADOW E&M-EST. PATIENT-LVL V: ICD-10-PCS | Mod: PBBFAC,,, | Performed by: INTERNAL MEDICINE

## 2021-02-18 PROCEDURE — 1159F PR MEDICATION LIST DOCUMENTED IN MEDICAL RECORD: ICD-10-PCS | Mod: S$GLB,,, | Performed by: INTERNAL MEDICINE

## 2021-02-18 PROCEDURE — 1125F PR PAIN SEVERITY QUANTIFIED, PAIN PRESENT: ICD-10-PCS | Mod: S$GLB,,, | Performed by: INTERNAL MEDICINE

## 2021-02-18 PROCEDURE — 3008F PR BODY MASS INDEX (BMI) DOCUMENTED: ICD-10-PCS | Mod: S$GLB,,, | Performed by: INTERNAL MEDICINE

## 2021-02-18 PROCEDURE — 96372 THER/PROPH/DIAG INJ SC/IM: CPT | Mod: S$GLB,,, | Performed by: INTERNAL MEDICINE

## 2021-02-18 PROCEDURE — 1101F PR PT FALLS ASSESS DOC 0-1 FALLS W/OUT INJ PAST YR: ICD-10-PCS | Mod: S$GLB,,, | Performed by: INTERNAL MEDICINE

## 2021-02-18 PROCEDURE — 99213 OFFICE O/P EST LOW 20 MIN: CPT | Mod: 25,S$GLB,, | Performed by: INTERNAL MEDICINE

## 2021-02-18 PROCEDURE — 1159F MED LIST DOCD IN RCRD: CPT | Mod: S$GLB,,, | Performed by: INTERNAL MEDICINE

## 2021-02-18 PROCEDURE — 96372 PR INJECTION,THERAP/PROPH/DIAG2ST, IM OR SUBCUT: ICD-10-PCS | Mod: S$GLB,,, | Performed by: INTERNAL MEDICINE

## 2021-02-18 PROCEDURE — 99999 PR PBB SHADOW E&M-EST. PATIENT-LVL V: CPT | Mod: PBBFAC,,, | Performed by: INTERNAL MEDICINE

## 2021-02-18 RX ORDER — TRAMADOL HYDROCHLORIDE 50 MG/1
50 TABLET ORAL EVERY 12 HOURS PRN
Qty: 20 TABLET | Refills: 0 | Status: SHIPPED | OUTPATIENT
Start: 2021-02-18 | End: 2021-01-01 | Stop reason: ALTCHOICE

## 2021-02-18 RX ORDER — PAROXETINE HYDROCHLORIDE 20 MG/1
20 TABLET, FILM COATED ORAL EVERY MORNING
Qty: 90 TABLET | Refills: 3 | Status: SHIPPED | OUTPATIENT
Start: 2021-02-18 | End: 2022-01-01 | Stop reason: SDUPTHER

## 2021-02-18 RX ORDER — IPRATROPIUM BROMIDE AND ALBUTEROL SULFATE 2.5; .5 MG/3ML; MG/3ML
3 SOLUTION RESPIRATORY (INHALATION)
Qty: 90 ML | Refills: 5 | Status: SHIPPED | OUTPATIENT
Start: 2021-02-18 | End: 2022-01-01

## 2021-02-18 RX ORDER — TRIAMCINOLONE ACETONIDE 40 MG/ML
60 INJECTION, SUSPENSION INTRA-ARTICULAR; INTRAMUSCULAR ONCE
Status: COMPLETED | OUTPATIENT
Start: 2021-02-18 | End: 2021-02-18

## 2021-02-18 RX ORDER — CLOPIDOGREL BISULFATE 75 MG/1
75 TABLET ORAL DAILY
Refills: 5 | Status: ON HOLD
Start: 2021-02-18 | End: 2021-01-01 | Stop reason: HOSPADM

## 2021-02-18 RX ORDER — AZITHROMYCIN 250 MG/1
TABLET, FILM COATED ORAL
Qty: 6 TABLET | Refills: 0 | Status: SHIPPED | OUTPATIENT
Start: 2021-02-18 | End: 2021-02-22

## 2021-02-18 RX ADMIN — TRIAMCINOLONE ACETONIDE 60 MG: 40 INJECTION, SUSPENSION INTRA-ARTICULAR; INTRAMUSCULAR at 03:02

## 2021-02-22 DIAGNOSIS — G44.209 TENSION-TYPE HEADACHE, NOT INTRACTABLE, UNSPECIFIED CHRONICITY PATTERN: ICD-10-CM

## 2021-02-22 RX ORDER — BUTALBITAL, ACETAMINOPHEN AND CAFFEINE 50; 325; 40 MG/1; MG/1; MG/1
1 TABLET ORAL 2 TIMES DAILY PRN
Qty: 20 TABLET | Refills: 1 | Status: SHIPPED | OUTPATIENT
Start: 2021-02-22 | End: 2021-01-01

## 2021-03-11 ENCOUNTER — OFFICE VISIT (OUTPATIENT)
Dept: PRIMARY CARE CLINIC | Facility: CLINIC | Age: 71
End: 2021-03-11
Payer: MEDICARE

## 2021-03-11 VITALS
OXYGEN SATURATION: 99 % | HEART RATE: 61 BPM | WEIGHT: 101.44 LBS | BODY MASS INDEX: 19.15 KG/M2 | SYSTOLIC BLOOD PRESSURE: 138 MMHG | DIASTOLIC BLOOD PRESSURE: 46 MMHG | RESPIRATION RATE: 18 BRPM | TEMPERATURE: 98 F | HEIGHT: 61 IN

## 2021-03-11 DIAGNOSIS — J44.9 CHRONIC OBSTRUCTIVE PULMONARY DISEASE, UNSPECIFIED COPD TYPE: ICD-10-CM

## 2021-03-11 DIAGNOSIS — I25.10 CORONARY ARTERY DISEASE INVOLVING NATIVE CORONARY ARTERY OF NATIVE HEART WITHOUT ANGINA PECTORIS: Primary | ICD-10-CM

## 2021-03-11 DIAGNOSIS — I10 ESSENTIAL HYPERTENSION: ICD-10-CM

## 2021-03-11 PROCEDURE — 3008F PR BODY MASS INDEX (BMI) DOCUMENTED: ICD-10-PCS | Mod: S$GLB,,, | Performed by: INTERNAL MEDICINE

## 2021-03-11 PROCEDURE — 99213 OFFICE O/P EST LOW 20 MIN: CPT | Mod: S$GLB,,, | Performed by: INTERNAL MEDICINE

## 2021-03-11 PROCEDURE — 3288F PR FALLS RISK ASSESSMENT DOCUMENTED: ICD-10-PCS | Mod: S$GLB,,, | Performed by: INTERNAL MEDICINE

## 2021-03-11 PROCEDURE — 3008F BODY MASS INDEX DOCD: CPT | Mod: S$GLB,,, | Performed by: INTERNAL MEDICINE

## 2021-03-11 PROCEDURE — 1101F PR PT FALLS ASSESS DOC 0-1 FALLS W/OUT INJ PAST YR: ICD-10-PCS | Mod: S$GLB,,, | Performed by: INTERNAL MEDICINE

## 2021-03-11 PROCEDURE — 99999 PR PBB SHADOW E&M-EST. PATIENT-LVL V: CPT | Mod: PBBFAC,,, | Performed by: INTERNAL MEDICINE

## 2021-03-11 PROCEDURE — 3288F FALL RISK ASSESSMENT DOCD: CPT | Mod: S$GLB,,, | Performed by: INTERNAL MEDICINE

## 2021-03-11 PROCEDURE — 1159F PR MEDICATION LIST DOCUMENTED IN MEDICAL RECORD: ICD-10-PCS | Mod: S$GLB,,, | Performed by: INTERNAL MEDICINE

## 2021-03-11 PROCEDURE — 99213 PR OFFICE/OUTPT VISIT, EST, LEVL III, 20-29 MIN: ICD-10-PCS | Mod: S$GLB,,, | Performed by: INTERNAL MEDICINE

## 2021-03-11 PROCEDURE — 1126F PR PAIN SEVERITY QUANTIFIED, NO PAIN PRESENT: ICD-10-PCS | Mod: S$GLB,,, | Performed by: INTERNAL MEDICINE

## 2021-03-11 PROCEDURE — 1101F PT FALLS ASSESS-DOCD LE1/YR: CPT | Mod: S$GLB,,, | Performed by: INTERNAL MEDICINE

## 2021-03-11 PROCEDURE — 1126F AMNT PAIN NOTED NONE PRSNT: CPT | Mod: S$GLB,,, | Performed by: INTERNAL MEDICINE

## 2021-03-11 PROCEDURE — 1159F MED LIST DOCD IN RCRD: CPT | Mod: S$GLB,,, | Performed by: INTERNAL MEDICINE

## 2021-03-11 PROCEDURE — 99999 PR PBB SHADOW E&M-EST. PATIENT-LVL V: ICD-10-PCS | Mod: PBBFAC,,, | Performed by: INTERNAL MEDICINE

## 2021-03-11 RX ORDER — NITROGLYCERIN 0.4 MG/1
0.4 TABLET SUBLINGUAL EVERY 5 MIN PRN
Qty: 25 TABLET | Refills: 1 | Status: SHIPPED | OUTPATIENT
Start: 2021-03-11 | End: 2022-01-01

## 2021-03-25 PROBLEM — R06.02 SHORTNESS OF BREATH: Status: ACTIVE | Noted: 2021-01-01

## 2021-03-26 PROBLEM — E87.70 FLUID OVERLOAD: Status: ACTIVE | Noted: 2021-01-01

## 2021-05-27 PROBLEM — D64.9 SYMPTOMATIC ANEMIA: Status: RESOLVED | Noted: 2020-08-30 | Resolved: 2021-01-01

## 2021-11-09 PROBLEM — J43.8 OTHER EMPHYSEMA: Status: ACTIVE | Noted: 2018-02-21

## 2021-12-14 PROBLEM — N18.6 ESRD (END STAGE RENAL DISEASE) ON DIALYSIS: Status: ACTIVE | Noted: 2018-03-14

## 2021-12-14 PROBLEM — Z99.2 ESRD (END STAGE RENAL DISEASE) ON DIALYSIS: Status: ACTIVE | Noted: 2018-03-14

## 2021-12-29 PROBLEM — J44.9 COPD (CHRONIC OBSTRUCTIVE PULMONARY DISEASE): Status: ACTIVE | Noted: 2018-02-21

## 2021-12-29 PROBLEM — E44.0 MODERATE MALNUTRITION: Status: ACTIVE | Noted: 2018-02-22

## 2021-12-29 NOTE — PT/OT/SLP PROGRESS
Physical Therapy      Patient Name:  Radha Jurado   MRN:  [id number]    PT eval attempted-Patient not seen today secondary to Dialysis..

## 2021-12-29 NOTE — PROGRESS NOTES
12/29/21 1347   Vital Signs   Temp 97.2 °F (36.2 °C)   Temp src Oral   Pulse 63   Heart Rate Source Monitor   Resp 20   SpO2 95 %   Flow (L/min) 2   O2 Device (Oxygen Therapy) nasal cannula   BP (!) 131/54   BP Location Right arm   BP Method Automatic   Patient Position Lying   Assessments (Pre/Post)   Consent Obtained yes   Safety vein preservation armband present   Date Hepatitis Profile Obtained 12/29/21   Blood Liters Processed (BLP) 50.8   Transport Modality not applicable   Level of Consciousness (AVPU) alert   Dialyzer Clearance clear   Post-Hemodialysis Assessment   Rinseback Volume (mL) 250 mL   Blood Volume Processed (Liters) 51 L   Dialyzer Clearance Clear   Duration of Treatment (minutes) 180 minutes   Hemodialysis Intake (mL) 500 mL   Total UF (mL) 2500 mL   Net Fluid Removal 2000   Patient Response to Treatment TOLERATED FAIR   Post-Treatment Weight 39.5 kg (87 lb 1.3 oz)   Treatment Weight Change -1.8   Arterial bleeding stop time (min) 10 min   Venous bleeding stop time (min) 10 min   Post-Hemodialysis Comments All blood reinfused per policy and procedure.   Verified signed hd consent prior to start of hd treatment.  Dr. Gil rounded during hd treatment.  UF Goal decreased to 2 liters.  Discussed with Dr. Gil.  Hemostasis x2 achieved to access site.  Report provided to HEVER snyder, post hd treatment.

## 2021-12-29 NOTE — ASSESSMENT & PLAN NOTE
Acute on chronic problem  Nephrology consulted. Continue Chronic hemodialysis. Monitor daily electrolytes and defer dialysis orders to nephrology.

## 2021-12-29 NOTE — PT/OT/SLP EVAL
Occupational Therapy   Evaluation    Name: Radha Jurado  MRN: [id number]  Admitting Diagnosis:  ESRD (end stage renal disease) on dialysis  Recent Surgery: * No surgery found *      Recommendations:     Discharge Recommendations: other (see comments) (TBD home vs HHOT)  Discharge Equipment Recommendations:  none  Barriers to discharge:       Assessment:     Radha Jurado is a 71 y.o. female with a medical diagnosis of ESRD (end stage renal disease) on dialysis.  She presents with the following performance deficits affecting function: weakness,impaired endurance,impaired self care skills,impaired functional mobilty,impaired cardiopulmonary response to activity.  Pt was agreeable to OT. Pt demonstrates BUE AROM/strength WFL's. OT provided instruction in home safety including review of home set up and DME/AE. Pt verbalized understanding. Pt required SBA with supine to sit EOB. Pt able to alexandre/doff socks with SBA. Pt required SBA with sit to stand with RW. Pt required SBA with sit to supine. Discharge recommendations TBD based on progress with therapy, home vs. HHOT.    Rehab Prognosis: Good; patient would benefit from acute skilled OT services to address these deficits and reach maximum level of function.       Plan:     Patient to be seen 4 x/week to address the above listed problems via self-care/home management,therapeutic activities,therapeutic exercises  · Plan of Care Expires: 01/19/22  · Plan of Care Reviewed with: patient    Subjective     Chief Complaint: No complaints  Patient/Family Comments/goals: To go home    Occupational Profile:  Living Environment: Pt lives with spouse in 1 story home with 5 KIMMIE with handrail. Pt has tub/shower with TTB and raised toilet.  Previous level of function: Modified Independent with some assist from spouse with bathing.   Equipment Used at Home:  rollator,bath bench,raised toilet  Assistance upon Discharge: Spouse    Pain/Comfort:  · Pain Rating 1: 0/10  · Pain Rating  Post-Intervention 1: 0/10    Patients cultural, spiritual, Anabaptism conflicts given the current situation:      Objective:     Communicated with: Nurse Monroe prior to session.  Patient found HOB elevated with bed alarm,peripheral IV,oxygen upon OT entry to room.    General Precautions: Standard, fall,respiratory   Orthopedic Precautions:N/A   Braces: N/A  Respiratory Status: Nasal cannula, flow 2 L/min    Occupational Performance:    Bed Mobility:    · Patient completed Supine to Sit with stand by assistance  · Patient completed Sit to Supine with stand by assistance    Functional Mobility/Transfers:  · Patient completed Sit <> Stand Transfer with stand by assistance  with  rolling walker       Activities of Daily Living:  · Feeding:  independence    · Grooming: stand by assistance set up in sitting  · Bathing: minimum assistance    · Upper Body Dressing: stand by assistance set up in sitting  · Lower Body Dressing: contact guard assistance    · Toileting: contact guard assistance      Cognitive/Visual Perceptual:  Pt alert and oriented    Physical Exam:  Upper Extremity Strength:    -       Right Upper Extremity: WFL  -       Left Upper Extremity: WFL    AMPAC 6 Click ADL:  AMPAC Total Score: 19    Treatment & Education:  OT provided education in role of OT. Pt verbalized understanding and was agreeable to OT.  OT provided instruction in home safety with ADL's/IADL's including review of home set up and DME/AE. Pt verbalized understanding.  Education:    Patient left HOB elevated with all lines intact, call button in reach and bed alarm on    GOALS:   Multidisciplinary Problems     Occupational Therapy Goals        Problem: Occupational Therapy Goal    Goal Priority Disciplines Outcome Interventions   Occupational Therapy Goal     OT, PT/OT     Description: Goals to be met by: 1/12/22    Patient will increase functional independence with ADLs by performing:    UE Dressing with Modified [place].  LE Dressing  with Modified [place].  Grooming while standing at sink with Modified [place].  Toileting from toilet with Modified [place] for hygiene and clothing management.   Bathing from  shower chair/bench with Modified [place].  Toilet transfer to toilet with Modified [place].  Increased strength and functional activity tolerance for ADL's/IADL's as evidenced by requiring less assistance with these tasks                     History:     Past Medical History:   Diagnosis Date    Carotid artery occlusion     Chronic diastolic CHF (congestive heart failure)     COPD (chronic obstructive pulmonary disease)     Coronary artery disease     Depression     End-stage renal disease on hemodialysis     History of gastric ulcer     Hyperlipidemia     Hypertension     Migraine headache     Myocardial infarction     Osteoarthritis     Peripheral artery disease        Past Surgical History:   Procedure Laterality Date    APPENDECTOMY  unknown    BLADDER SUSPENSION N/A 2005    with Mesh    CARDIAC CATHETERIZATION  2009    [facility]    CAROTID ENDARTERECTOMY Right 6/1/2021    Procedure: ENDARTERECTOMY-CAROTID;  Surgeon: Joseph Bello MD;  Location: [facility];  Service: Peripheral Vascular;  Laterality: Right;    CATHETERIZATION OF BOTH LEFT AND RIGHT HEART  06/25/2020    Right brachial accessed    CHOLECYSTECTOMY N/A 2017    COLONOSCOPY N/A 10/5/2020    Procedure: COLONOSCOPY;  Surgeon: Merlin Keller MD;  Location: [facility];  Service: Endoscopy;  Laterality: N/A;    CREATION OF AXILLARY-FEMORAL ARTERY BYPASS WITH GRAFT Right 8/20/2019    Procedure: CREATION, BYPASS, ARTERIAL, AXILLARY TO FEMORAL, USING GRAFT;  Surgeon: Joseph Bello MD;  Location: [facility] OR;  Service: Cardiovascular;  Laterality: Right;    ESOPHAGOGASTRODUODENOSCOPY N/A 10/5/2020    Procedure: EGD (ESOPHAGOGASTRODUODENOSCOPY);  Surgeon: Merlin Keller MD;  Location: [facility];  Service: Endoscopy;   Laterality: N/A;    FISTULOGRAM Left 8/8/2019    Procedure: FISTULOGRAM;  Surgeon: Calixto Javed MD;  Location: [facility] CATH LAB;  Service: Nephrology;  Laterality: Left;    FISTULOGRAM Left 5/19/2020    Procedure: FISTULOGRAM;  Surgeon: Janell Delgado MD;  Location: [facility] CATH LAB;  Service: Nephrology;  Laterality: Left;    HYSTERECTOMY      LEFT HEART CATHETERIZATION Right 6/25/2020    Procedure: Left heart cath;  Surgeon: Alex Mejía MD;  Location: [facility] CATH LAB;  Service: Cardiology;  Laterality: Right;  brachial access    LEFT HEART CATHETERIZATION Right 10/26/2020    Procedure: CATHETERIZATION, HEART, LEFT;  Surgeon: Lester De La Fuente MD;  Location: [facility] CATH LAB;  Service: Cardiology;  Laterality: Right;    NEPHRECTOMY Right 1977    OOPHORECTOMY         Time Tracking:     OT Date of Treatment: 12/29/21  OT Start Time: 0948  OT Stop Time: 0958  OT Total Time (min): 10 min    Billable Minutes:Evaluation 10    12/29/2021

## 2021-12-29 NOTE — ASSESSMENT & PLAN NOTE
Chronic Problem  Patient's COPD is controlled currently.  Patient is currently off COPD Pathway. Continue scheduled inhalers Supplemental oxygen and monitor respiratory status closely.

## 2021-12-29 NOTE — ASSESSMENT & PLAN NOTE
Chronic Problem  Nutrition consulted. Body mass index is 17.19 kg/m².. Encourage maximal PO intake. Diet supplementation ordered per nutrition approval. Will encourage PO and monitor closely for weight changes.

## 2021-12-29 NOTE — PLAN OF CARE
[facility]  Initial Discharge Assessment       Primary Care Provider: Jeovany Carpio MD    Admission Diagnosis: Shortness of breath [R06.02]  Hypervolemia, unspecified hypervolemia type [E87.70]    Admission Date: 12/29/2021  Expected Discharge Date:      Pt was recently admitted 12/13-12/15 and discharged home with no needs. Pt lives with spouse Spouse- Tan [phone]. Pt attends HD in [facility] at 7 am. Pts PCP is Dr. Carpio. Pt has a home rollator and denies current HH. Pt has [employer] managed medicare. Pt is on Plavix . Pts discharge plan is home with family. May benefit from new HH services. CM following.     Discharge Barriers Identified: None    Payor: [employer] MEDICARE / Plan: [employer] [place] / Product Type: Medicare Advantage /     Extended Emergency Contact Information  Primary Emergency Contact: Tan Jade  Address: [address] [place] [address]  Home Phone: [phone]  Mobile Phone: [phone]  Relation: Spouse  Secondary Emergency Contact: YONNY JADE  Mobile Phone: [phone]  Relation: Daughter  Preferred language: English   needed? No    Discharge Plan A: Home with family  Discharge Plan B: Home with family,Home Health      [facility] - [place], [place] - [address]  [address] [facility] [address]  Phone: [phone] Fax: [phone]    [address] [place]  [address] [facility]  [address] [facility] [address]  Phone: [phone] Fax: [phone]      Initial Assessment (most recent)     Adult Discharge Assessment - 12/29/21 0957        Discharge Assessment    Assessment Type Discharge Planning Assessment     Confirmed/corrected address, phone number and insurance Yes     Confirmed Demographics Correct on Facesheet     Source of Information patient     Communicated JUAN with patient/caregiver Yes     Reason For  Admission SOB     Lives With spouse     Do you have help at home or someone to help you manage your care at home? Yes     Who are your caregiver(s) and their phone number(s)? Spouse Tan [phone]     Prior to hospitilization cognitive status: Alert/Oriented     Current cognitive status: Alert/Oriented     Walking or Climbing Stairs Difficulty ambulation difficulty, requires equipment     Mobility Management Rollator     Dressing/Bathing Difficulty none     Home Layout Able to live on 1st floor     Equipment Currently Used at Home rollator     Readmission within 30 days? Yes     Patient currently being followed by outpatient case management? No     Do you currently have service(s) that help you manage your care at home? No     Do you take prescription medications? Yes     Do you have prescription coverage? Yes     Do you have any problems affording any of your prescribed medications? No     Is the patient taking medications as prescribed? yes     Who is going to help you get home at discharge? Spouse- Tan [phone]     How do you get to doctors appointments? family or friend will provide     Are you on dialysis? Yes     [facility]     Do you take coumadin? No     Discharge Plan A Home with family     Discharge Plan B Home with family;Home Health     DME Needed Upon Discharge  none     Discharge Plan discussed with: Patient     Discharge Barriers Identified None        Relationship/Environment    Name(s) of Who Lives With Patient Spouse- Tan [phone]

## 2021-12-29 NOTE — PLAN OF CARE
Intervention: sodium modified diet and nutrition education    Recommendation:   1) Change diet to Renal, low sodium   2) Add arginaid daily + boost pudding chocolate BID   3) weigh s/p HD   4) Nutrition education verbally reviewed    Goals: 1) PO Intakes > 50% of meals and supplements at f/u  Nutrition Goal Status: new  Communication of RD Recs: reviewed with physician (POC, sticky note)

## 2021-12-29 NOTE — PLAN OF CARE
Talked to home dialysis unit ([facility]). Pt's regular schedule is MWF 6:45 AM. States she is okay to come for Friday's treatment at her regular scheduled time. Requested clinicals to be faxed to [phone]

## 2021-12-29 NOTE — PLAN OF CARE
Left VM for pt's home dialysis unit ([facility] [phone]) requesting call back regarding pt's dialysis schedule with upcoming holiday

## 2021-12-29 NOTE — PROGRESS NOTES
Patient seen and examined.  Admission note and vitals reviewed.  Patient seen on dialysis.  States she was non-compliant 2/2 weakness.  We had a long discussion on the dangers of non-compliance.  She reports chronic SOB. She is a daily smoker.  She is uninterested in a nicotine patch or quitting.      On Exam:  Heart rate and rhythm regular, lungs with expiratory wheezing. HD in progress without issue.    A/P:  -Discussed with nephrology.  Plan to dialyze again tomorrow.  CM to verify her dialysis schedule with end of year holiday and will plan for D/C tomorrow.    -Need for compliance reinforced.  She agrees.

## 2021-12-29 NOTE — NURSING
Labs [place]ed [place] [phone] [place] [address] and appointment for 6/25/20 canceled Pt arrived to the unit in no acute distress. Vital signs taken. Pt denies pain, SOB, N/V, or any other sx at this time. Safety precautions in place. Call light in reach. Oriented to room. Handoff report given to staci Monroe.

## 2021-12-29 NOTE — SUBJECTIVE & OBJECTIVE
Past Medical History:   Diagnosis Date    Carotid artery occlusion     Chronic diastolic CHF (congestive heart failure)     COPD (chronic obstructive pulmonary disease)     Coronary artery disease     Depression     End-stage renal disease on hemodialysis     History of gastric ulcer     Hyperlipidemia     Hypertension     Migraine headache     Myocardial infarction     Osteoarthritis     Peripheral artery disease        Past Surgical History:   Procedure Laterality Date    APPENDECTOMY  unknown    BLADDER SUSPENSION N/A 2005    with Mesh    CARDIAC CATHETERIZATION  2009    MI w/PCI @ [facility]    CAROTID ENDARTERECTOMY Right 6/1/2021    Procedure: ENDARTERECTOMY-CAROTID;  Surgeon: Joseph Bello MD;  Location: [facility];  Service: Peripheral Vascular;  Laterality: Right;    CATHETERIZATION OF BOTH LEFT AND RIGHT HEART  06/25/2020    Right brachial accessed    CHOLECYSTECTOMY N/A 2017    COLONOSCOPY N/A 10/5/2020    Procedure: COLONOSCOPY;  Surgeon: Merlin Keller MD;  Location: [facility];  Service: Endoscopy;  Laterality: N/A;    CREATION OF AXILLARY-FEMORAL ARTERY BYPASS WITH GRAFT Right 8/20/2019    Procedure: CREATION, BYPASS, ARTERIAL, AXILLARY TO FEMORAL, USING GRAFT;  Surgeon: Joseph Bello MD;  Location: [facility] OR;  Service: Cardiovascular;  Laterality: Right;    ESOPHAGOGASTRODUODENOSCOPY N/A 10/5/2020    Procedure: EGD (ESOPHAGOGASTRODUODENOSCOPY);  Surgeon: Merlin Keller MD;  Location: [facility];  Service: Endoscopy;  Laterality: N/A;    FISTULOGRAM Left 8/8/2019    Procedure: FISTULOGRAM;  Surgeon: Calixto Javed MD;  Location: [facility] CATH LAB;  Service: Nephrology;  Laterality: Left;    FISTULOGRAM Left 5/19/2020    Procedure: FISTULOGRAM;  Surgeon: Janell Delgado MD;  Location: [facility] CATH LAB;  Service: Nephrology;  Laterality: Left;    HYSTERECTOMY      LEFT HEART CATHETERIZATION Right 6/25/2020    Procedure: Left heart cath;  Surgeon: Alex Mejía MD;   Location: [facility] C[facility] LAB;  Service: Cardiology;  Laterality: Right;  brachial access    LEFT HEART CATHETERIZATION Right 10/26/2020    Procedure: CATHETERIZATION, HEART, LEFT;  Surgeon: Lester De La Fuente MD;  Location: [facility] CATH LAB;  Service: Cardiology;  Laterality: Right;    NEPHRECTOMY Right 1977    OOPHORECTOMY         Review of patient's allergies indicates:   Allergen Reactions    Dulcolax [bisacodyl] Other (See Comments)     Sweating a lot, weakness    Docusate sodium      Other reaction(s): Makes sweat profusely; Weak    Dulcagen        Current Facility-Administered Medications on File Prior to Encounter   Medication    0.9%  NaCl infusion     Current Outpatient Medications on File Prior to Encounter   Medication Sig    albuterol (PROVENTIL/VENTOLIN HFA) 90 mcg/actuation inhaler Inhale 2 puffs into the lungs every 4 (four) hours as needed for Wheezing or Shortness of Breath. Rescue    albuterol-ipratropium (DUO-NEB) 2.5 mg-0.5 mg/3 mL nebulizer solution Take 3 mLs by nebulization every 6 (six) hours while awake. Rescue    amLODIPine (NORVASC) 10 MG tablet Take 10 mg by mouth once daily.    aspirin (ECOTRIN) 81 MG EC tablet Take 1 tablet (81 mg total) by mouth once daily.    atorvastatin (LIPITOR) 20 MG tablet TAKE 1 TABLET (20 MG TOTAL) BY MOUTH ONCE DAILY.    butalbital-acetaminophen-caffeine -40 mg (FIORICET, ESGIC) -40 mg per tablet Daily prn HA    calcium carbonate (OS-UMAIR) 600 mg (1,500 mg) Tab Take 600 mg by mouth once daily.    clopidogreL (PLAVIX) 75 mg tablet Take 1 tablet (75 mg total) by mouth once daily.    cyproheptadine (PERIACTIN) 4 mg tablet Take 1 tablet (4 mg total) by mouth 2 (two) times daily.    fluticasone furoate-vilanteroL (BREO) 100-25 mcg/dose diskus inhaler Inhale 1 puff into the lungs once daily. Controller    hydrALAZINE (APRESOLINE) 25 MG tablet Take 1 tablet by mouth 2 (two) times daily.    meclizine (ANTIVERT) 12.5 mg tablet Take 1 tablet (12.5 mg  total) by mouth 3 (three) times daily as needed for Dizziness.    metoprolol succinate (TOPROL-XL) 50 MG 24 hr tablet Take 1 tablet by mouth once daily.    nitroGLYCERIN (NITROSTAT) 0.4 MG SL tablet Place 1 tablet (0.4 mg total) under the tongue every 5 (five) minutes as needed for Chest pain (x3 if not better to ER).    ondansetron (ZOFRAN-ODT) 4 MG TbDL Take 1 tablet (4 mg total) by mouth every 8 (eight) hours as needed (nausea).    pantoprazole (PROTONIX) 40 MG tablet Take 1 tablet (40 mg total) by mouth 2 (two) times daily before meals. (Patient not taking: Reported on 12/6/2021)    paroxetine (PAXIL) 20 MG tablet Take 1 tablet (20 mg total) by mouth every morning.    sodium bicarbonate 650 MG tablet Take 2 tablets by mouth 2 (two) times a day.    sucralfate (CARAFATE) 1 gram tablet TAKE ONE TABLET BY MOUTH THREE TIMES DAILY BEFORE MEALS AND BEDTIME    traMADoL (ULTRAM) 50 mg tablet Take 1 tablet by mouth every 6 (six) hours as needed.    umeclidinium-vilanteroL (ANORO ELLIPTA) 62.5-25 mcg/actuation DsDv Controller     Family History     Problem Relation (Age of Onset)    Cancer Mother, Father    Heart attack Father    Heart disease Father    Heart failure Father    Hypertension Father        Tobacco Use    Smoking status: Light Tobacco Smoker     Packs/day: 0.25     Years: 50.00     Pack years: 12.50     Types: Cigarettes     Start date: 1962    Smokeless tobacco: Never Used    Tobacco comment: Do not smoke day of surgery   Substance and Sexual Activity    Alcohol use: Not Currently     Comment: holidays    Drug use: No    Sexual activity: Not Currently     Partners: Male     Review of Systems   Constitutional: Positive for activity change, appetite change and fatigue. Negative for chills, diaphoresis and fever.   HENT: Negative for congestion, nosebleeds and tinnitus.    Eyes: Negative for photophobia and visual disturbance.   Respiratory: Positive for cough, shortness of breath and wheezing.  Negative for chest tightness.    Cardiovascular: Negative for chest pain, palpitations and leg swelling.   Gastrointestinal: Negative for abdominal distention, abdominal pain, constipation, diarrhea, nausea and vomiting.   Endocrine: Negative for cold intolerance and heat intolerance.   Genitourinary: Negative for difficulty urinating, dysuria, frequency, hematuria and urgency.   Musculoskeletal: Negative for arthralgias, back pain and myalgias.   Skin: Negative for pallor, rash and wound.   Allergic/Immunologic: Negative for immunocompromised state.   Neurological: Positive for weakness. Negative for dizziness, tremors, facial asymmetry and speech difficulty.   Hematological: Negative for adenopathy. Does not bruise/bleed easily.   Psychiatric/Behavioral: Negative for confusion and sleep disturbance. The patient is not nervous/anxious.      Objective:     Vital Signs (Most Recent):  Temp: 97.5 °F (36.4 °C) (12/29/21 0137)  Pulse: 98 (12/29/21 0505)  Resp: (!) 22 (12/29/21 0137)  BP: (!) 180/98 (12/29/21 0505)  SpO2: (!) 92 % (12/29/21 0505) Vital Signs (24h Range):  Temp:  [97.5 °F (36.4 °C)] 97.5 °F (36.4 °C)  Pulse:  [92-98] 98  Resp:  [22] 22  SpO2:  [91 %-98 %] 92 %  BP: (139-194)/(55-98) 180/98     Weight: 41.3 kg (91 lb)  Body mass index is 17.19 kg/m².    Physical Exam  Vitals and nursing note reviewed.   Constitutional:       General: She is not in acute distress.     Appearance: She is well-developed and well-nourished. She is ill-appearing. She is not diaphoretic.   HENT:      Head: Normocephalic.      Mouth/Throat:      Mouth: Oropharynx is clear and moist. Mucous membranes are moist.   Eyes:      General: No scleral icterus.     Conjunctiva/sclera: Conjunctivae normal.      Pupils: Pupils are equal, round, and reactive to light.   Neck:      Vascular: No JVD.   Cardiovascular:      Rate and Rhythm: Normal rate and regular rhythm.      Pulses: Intact distal pulses.      Heart sounds: Normal heart sounds.  No murmur heard.  No friction rub. No gallop.    Pulmonary:      Effort: Respiratory distress present.      Breath sounds: Wheezing and rhonchi present. No rales.   Abdominal:      General: Bowel sounds are normal. There is no distension.      Palpations: Abdomen is soft.      Tenderness: There is no abdominal tenderness. There is no guarding or rebound.   Musculoskeletal:         General: No tenderness or edema. Normal range of motion.      Cervical back: Normal range of motion and neck supple.   Lymphadenopathy:      Cervical: No cervical adenopathy.   Skin:     General: Skin is warm and dry.      Capillary Refill: Capillary refill takes less than 2 seconds.      Coloration: Skin is not pale.      Findings: No erythema or rash.   Neurological:      Mental Status: She is alert and oriented to person, place, and time.      Cranial Nerves: No cranial nerve deficit.      Sensory: No sensory deficit.      Coordination: Coordination normal.      Deep Tendon Reflexes: Reflexes normal.   Psychiatric:         Mood and Affect: Mood and affect normal.         Behavior: Behavior normal.         Thought Content: Thought content normal.         Judgment: Judgment normal.           CRANIAL NERVES     CN III, IV, VI   Pupils are equal, round, and reactive to light.       Significant Labs:   All pertinent labs within the past 24 hours have been reviewed.  CBC:   Recent Labs   Lab 12/29/21 0210   WBC 7.80   HGB 9.4*   HCT 32.5*        CMP:   Recent Labs   Lab 12/29/21 0210      K 3.8   *   CO2 13*   GLU 81   BUN 56*   CREATININE 4.6*   CALCIUM 10.0   PROT 7.3   ALBUMIN 3.1*   BILITOT 0.3   ALKPHOS 117   AST 11   ALT 7*   ANIONGAP 15   EGFRNONAA 9*     Cardiac Markers:   Recent Labs   Lab 12/29/21 0210   BNP 3,832*       Significant Imaging: I have reviewed all pertinent imaging results/findings within the past 24 hours.

## 2021-12-29 NOTE — H&P
[facility] Emergency Dept  [facility] Medicine  History & Physical    Patient Name: Radha Jurado  MRN: [id number]  Patient Class: IP- Inpatient  Admission Date: 12/29/2021  Attending Physician: Emelia Delgadillo MD   Primary Care Provider: Jeovany Carpio MD         Patient information was obtained from patient, past medical records and ER records.     Subjective:     Principal Problem:ESRD (end stage renal disease) on dialysis    Chief Complaint:   Chief Complaint   Patient presents with    Shortness of Breath     Patient reporting shortness of breath. Patient has missed 5 HD appointments since last week        HPI: Radha Jurado is a 71-year-old female who presents to the emergency room this morning complaining of shortness of breath.  She reports shortness of breath has been present for the past approximately 1 week and has progressively worsened.  She denies any fever or chills.  No known sick contacts or travel.  She is vaccinated for COVID.  She reports she has missed her last 5 hemodialysis sessions due to not feeling well and also social issues regarding transportation.  Previous medical history includes chronic diastolic heart failure, coronary artery disease, dialysis, hep C, hyperlipidemia, and active smoker.  ER workup:  CBC with baseline anemia.  CMP with BUN of 56, creatinine of 4.6, bicarb of 13. BNP 3832. Troponin mildly elevated 0.04.  Chest x-ray demonstrates cardiomegaly a with large right pleural effusion.  She is scheduled to follow-up with pulmonology for possible bronchoscopy.  Patient admitted to Hospital Medicine for observation management.  Will consult Nephrology for dialysis today.      Past Medical History:   Diagnosis Date    Carotid artery occlusion     Chronic diastolic CHF (congestive heart failure)     COPD (chronic obstructive pulmonary disease)     Coronary artery disease     Depression     End-stage renal disease on hemodialysis     History of gastric ulcer      Hyperlipidemia     Hypertension     Migraine headache     Myocardial infarction     Osteoarthritis     Peripheral artery disease        Past Surgical History:   Procedure Laterality Date    APPENDECTOMY  unknown    BLADDER SUSPENSION N/A 2005    with Mesh    CARDIAC CATHETERIZATION  2009    [facility] w/[facility]    CAROTID ENDARTERECTOMY Right 6/1/2021    Procedure: ENDARTERECTOMY-CAROTID;  Surgeon: Joseph Bello MD;  Location: [facility];  Service: Peripheral Vascular;  Laterality: Right;    CATHETERIZATION OF BOTH LEFT AND RIGHT HEART  06/25/2020    Right brachial accessed    CHOLECYSTECTOMY N/A 2017    COLONOSCOPY N/A 10/5/2020    Procedure: COLONOSCOPY;  Surgeon: Merlin Keller MD;  Location: [facility];  Service: Endoscopy;  Laterality: N/A;    CREATION OF AXILLARY-FEMORAL ARTERY BYPASS WITH GRAFT Right 8/20/2019    Procedure: CREATION, BYPASS, ARTERIAL, AXILLARY TO FEMORAL, USING GRAFT;  Surgeon: Joseph Bello MD;  Location: [facility];  Service: Cardiovascular;  Laterality: Right;    ESOPHAGOGASTRODUODENOSCOPY N/A 10/5/2020    Procedure: EGD (ESOPHAGOGASTRODUODENOSCOPY);  Surgeon: Merlin Keller MD;  Location: [facility];  Service: Endoscopy;  Laterality: N/A;    FISTULOGRAM Left 8/8/2019    Procedure: FISTULOGRAM;  Surgeon: Calixto Javed MD;  Location: [facility] CATH LAB;  Service: Nephrology;  Laterality: Left;    FISTULOGRAM Left 5/19/2020    Procedure: FISTULOGRAM;  Surgeon: Janell Delgado MD;  Location: [facility] CATH LAB;  Service: Nephrology;  Laterality: Left;    HYSTERECTOMY      LEFT HEART CATHETERIZATION Right 6/25/2020    Procedure: Left heart cath;  Surgeon: Alex Mejía MD;  Location: [facility] [place] CATH LAB;  Service: Cardiology;  Laterality: Right;  brachial access    LEFT HEART CATHETERIZATION Right 10/26/2020    Procedure: CATHETERIZATION, HEART, LEFT;  Surgeon: Lester De La Fuente MD;  Location: [facility] [place] CATH LAB;  Service: Cardiology;  Laterality: Right;    NEPHRECTOMY Right 1977     OOPHORECTOMY         Review of patient's allergies indicates:   Allergen Reactions    Dulcolax [bisacodyl] Other (See Comments)     Sweating a lot, weakness    Docusate sodium      Other reaction(s): Makes sweat profusely; Weak    Dulcagen        Current Facility-Administered Medications on File Prior to Encounter   Medication    0.9%  NaCl infusion     Current Outpatient Medications on File Prior to Encounter   Medication Sig    albuterol (PROVENTIL/VENTOLIN HFA) 90 mcg/actuation inhaler Inhale 2 puffs into the lungs every 4 (four) hours as needed for Wheezing or Shortness of Breath. Rescue    albuterol-ipratropium (DUO-NEB) 2.5 mg-0.5 mg/3 mL nebulizer solution Take 3 mLs by nebulization every 6 (six) hours while awake. Rescue    amLODIPine (NORVASC) 10 MG tablet Take 10 mg by mouth once daily.    aspirin (ECOTRIN) 81 MG EC tablet Take 1 tablet (81 mg total) by mouth once daily.    atorvastatin (LIPITOR) 20 MG tablet TAKE 1 TABLET (20 MG TOTAL) BY MOUTH ONCE DAILY.    butalbital-acetaminophen-caffeine -40 mg (FIORICET, ESGIC) -40 mg per tablet Daily prn HA    calcium carbonate (OS-UMAIR) 600 mg (1,500 mg) Tab Take 600 mg by mouth once daily.    clopidogreL (PLAVIX) 75 mg tablet Take 1 tablet (75 mg total) by mouth once daily.    cyproheptadine (PERIACTIN) 4 mg tablet Take 1 tablet (4 mg total) by mouth 2 (two) times daily.    fluticasone furoate-vilanteroL (BREO) 100-25 mcg/dose diskus inhaler Inhale 1 puff into the lungs once daily. Controller    hydrALAZINE (APRESOLINE) 25 MG tablet Take 1 tablet by mouth 2 (two) times daily.    meclizine (ANTIVERT) 12.5 mg tablet Take 1 tablet (12.5 mg total) by mouth 3 (three) times daily as needed for Dizziness.    metoprolol succinate (TOPROL-XL) 50 MG 24 hr tablet Take 1 tablet by mouth once daily.    nitroGLYCERIN (NITROSTAT) 0.4 MG SL tablet Place 1 tablet (0.4 mg total) under the tongue every 5 (five) minutes as needed for Chest pain (x3  if not better to ER).    ondansetron (ZOFRAN-ODT) 4 MG TbDL Take 1 tablet (4 mg total) by mouth every 8 (eight) hours as needed (nausea).    pantoprazole (PROTONIX) 40 MG tablet Take 1 tablet (40 mg total) by mouth 2 (two) times daily before meals. (Patient not taking: Reported on 12/6/2021)    paroxetine (PAXIL) 20 MG tablet Take 1 tablet (20 mg total) by mouth every morning.    sodium bicarbonate 650 MG tablet Take 2 tablets by mouth 2 (two) times a day.    sucralfate (CARAFATE) 1 gram tablet TAKE ONE TABLET BY MOUTH THREE TIMES DAILY BEFORE MEALS AND BEDTIME    traMADoL (ULTRAM) 50 mg tablet Take 1 tablet by mouth every 6 (six) hours as needed.    umeclidinium-vilanteroL (ANORO ELLIPTA) 62.5-25 mcg/actuation DsDv Controller     Family History     Problem Relation (Age of Onset)    Cancer Mother, Father    Heart attack Father    Heart disease Father    Heart failure Father    Hypertension Father        Tobacco Use    Smoking status: Light Tobacco Smoker     Packs/day: 0.25     Years: 50.00     Pack years: 12.50     Types: Cigarettes     Start date: 1962    Smokeless tobacco: Never Used    Tobacco comment: Do not smoke day of surgery   Substance and Sexual Activity    Alcohol use: Not Currently     Comment: holidays    Drug use: No    Sexual activity: Not Currently     Partners: Male     Review of Systems   Constitutional: Positive for activity change, appetite change and fatigue. Negative for chills, diaphoresis and fever.   HENT: Negative for congestion, nosebleeds and tinnitus.    Eyes: Negative for photophobia and visual disturbance.   Respiratory: Positive for cough, shortness of breath and wheezing. Negative for chest tightness.    Cardiovascular: Negative for chest pain, palpitations and leg swelling.   Gastrointestinal: Negative for abdominal distention, abdominal pain, constipation, diarrhea, nausea and vomiting.   Endocrine: Negative for cold intolerance and heat intolerance.   Genitourinary:  Negative for difficulty urinating, dysuria, frequency, hematuria and urgency.   Musculoskeletal: Negative for arthralgias, back pain and myalgias.   Skin: Negative for pallor, rash and wound.   Allergic/Immunologic: Negative for immunocompromised state.   Neurological: Positive for weakness. Negative for dizziness, tremors, facial asymmetry and speech difficulty.   Hematological: Negative for adenopathy. Does not bruise/bleed easily.   Psychiatric/Behavioral: Negative for confusion and sleep disturbance. The patient is not nervous/anxious.      Objective:     Vital Signs (Most Recent):  Temp: 97.5 °F (36.4 °C) (12/29/21 0137)  Pulse: 98 (12/29/21 0505)  Resp: (!) 22 (12/29/21 0137)  BP: (!) 180/98 (12/29/21 0505)  SpO2: (!) 92 % (12/29/21 0505) Vital Signs (24h Range):  Temp:  [97.5 °F (36.4 °C)] 97.5 °F (36.4 °C)  Pulse:  [92-98] 98  Resp:  [22] 22  SpO2:  [91 %-98 %] 92 %  BP: (139-194)/(55-98) 180/98     Weight: 41.3 kg (91 lb)  Body mass index is 17.19 kg/m².    Physical Exam  Vitals and nursing note reviewed.   Constitutional:       General: She is not in acute distress.     Appearance: She is well-developed and well-nourished. She is ill-appearing. She is not diaphoretic.   HENT:      Head: Normocephalic.      Mouth/Throat:      Mouth: Oropharynx is clear and moist. Mucous membranes are moist.   Eyes:      General: No scleral icterus.     Conjunctiva/sclera: Conjunctivae normal.      Pupils: Pupils are equal, round, and reactive to light.   Neck:      Vascular: No JVD.   Cardiovascular:      Rate and Rhythm: Normal rate and regular rhythm.      Pulses: Intact distal pulses.      Heart sounds: Normal heart sounds. No murmur heard.  No friction rub. No gallop.    Pulmonary:      Effort: Respiratory distress present.      Breath sounds: Wheezing and rhonchi present. No rales.   Abdominal:      General: Bowel sounds are normal. There is no distension.      Palpations: Abdomen is soft.      Tenderness: There is no  abdominal tenderness. There is no guarding or rebound.   Musculoskeletal:         General: No tenderness or edema. Normal range of motion.      Cervical back: Normal range of motion and neck supple.   Lymphadenopathy:      Cervical: No cervical adenopathy.   Skin:     General: Skin is warm and dry.      Capillary Refill: Capillary refill takes less than 2 seconds.      Coloration: Skin is not pale.      Findings: No erythema or rash.   Neurological:      Mental Status: She is alert and oriented to person, place, and time.      Cranial Nerves: No cranial nerve deficit.      Sensory: No sensory deficit.      Coordination: Coordination normal.      Deep Tendon Reflexes: Reflexes normal.   Psychiatric:         Mood and Affect: Mood and affect normal.         Behavior: Behavior normal.         Thought Content: Thought content normal.         Judgment: Judgment normal.           CRANIAL NERVES     CN III, IV, VI   Pupils are equal, round, and reactive to light.       Significant Labs:   All pertinent labs within the past 24 hours have been reviewed.  CBC:   Recent Labs   Lab 12/29/21 0210   WBC 7.80   HGB 9.4*   HCT 32.5*        CMP:   Recent Labs   Lab 12/29/21 0210      K 3.8   *   CO2 13*   GLU 81   BUN 56*   CREATININE 4.6*   CALCIUM 10.0   PROT 7.3   ALBUMIN 3.1*   BILITOT 0.3   ALKPHOS 117   AST 11   ALT 7*   ANIONGAP 15   EGFRNONAA 9*     Cardiac Markers:   Recent Labs   Lab 12/29/21 0210   BNP 3,832*       Significant Imaging: I have reviewed all pertinent imaging results/findings within the past 24 hours.    Assessment/Plan:     * ESRD (end stage renal disease) on dialysis  Acute on chronic problem  Nephrology consulted. Continue Chronic hemodialysis. Monitor daily electrolytes and defer dialysis orders to nephrology.      Pleural effusion  Chronic problem          Malnutrition of mild degree  Chronic Problem  Nutrition consulted. Body mass index is 17.19 kg/m².. Encourage maximal PO intake.  Diet supplementation ordered per nutrition approval. Will encourage PO and monitor closely for weight changes.      COPD (chronic obstructive pulmonary disease)  Chronic Problem  Patient's COPD is controlled currently.  Patient is currently off COPD Pathway. Continue scheduled inhalers Supplemental oxygen and monitor respiratory status closely.       Hypertension due to end stage renal disease on dialysis  Chronic Problem  Chronic, controlled.  Latest blood pressure and vitals reviewed-   Temp:  [97.5 °F (36.4 °C)]   Pulse:  [92-98]   Resp:  [22]   BP: (139-194)/(55-98)   SpO2:  [91 %-98 %] .   Home meds for hypertension were reviewed and noted below.   Hypertension Medications             amLODIPine (NORVASC) 10 MG tablet Take 10 mg by mouth once daily.    hydrALAZINE (APRESOLINE) 25 MG tablet Take 1 tablet by mouth 2 (two) times daily.    metoprolol succinate (TOPROL-XL) 50 MG 24 hr tablet Take 1 tablet by mouth once daily.    nitroGLYCERIN (NITROSTAT) 0.4 MG SL tablet Place 1 tablet (0.4 mg total) under the tongue every 5 (five) minutes as needed for Chest pain (x3 if not better to ER).          While in the hospital, will manage blood pressure as follows; Continue home antihypertensive regimen    Will utilize p.r.n. blood pressure medication only if patient's blood pressure greater than  180/110 and she develops symptoms such as worsening chest pain or shortness of breath.      Hyperlipidemia  Chronic Problem      Tobacco abuse  Chronic Problem  Dangers of cigarette smoking were reviewed with patient in detail for 10 minutes and patient was encouraged to quit. Nicotine replacement options were discussed.        VTE Risk Mitigation (From admission, onward)         Ordered     Place CARMEN hose  Until discontinued         12/29/21 0526     IP VTE HIGH RISK PATIENT  Once         12/29/21 0526     Place sequential compression device  Until discontinued         12/29/21 0526                   Calin Hopkins,  NP  Department of Hospital Medicine   [facility] - Emergency Dept

## 2021-12-29 NOTE — CONSULTS
[facility] Surgery  Consult Note    Consults  Subjective:     Chief Complaint/Reason for Admission:  Shortness of breath    History of Present Illness:  This is a 71-year-old female with end-stage renal disease on dialysis, congestive heart failure, chronic right-sided pleural effusion.  She is issues with dialysis were the center recommends that she go to the ER.  This has resulted in her missing multiple consecutive dialysis treatments.  She intermittently comes in and is admitted to the hospital for the same complaints.  It was recommended by pulmonology that she have a PleurX catheter for intermittent pleural drainage to aid in her shortness of breath to help keep her out of the hospital.    Current Facility-Administered Medications on File Prior to Encounter   Medication    0.9%  NaCl infusion     Current Outpatient Medications on File Prior to Encounter   Medication Sig    albuterol (PROVENTIL/VENTOLIN HFA) 90 mcg/actuation inhaler Inhale 2 puffs into the lungs every 4 (four) hours as needed for Wheezing or Shortness of Breath. Rescue    albuterol-ipratropium (DUO-NEB) 2.5 mg-0.5 mg/3 mL nebulizer solution Take 3 mLs by nebulization every 6 (six) hours while awake. Rescue    amLODIPine (NORVASC) 10 MG tablet Take 10 mg by mouth once daily.    aspirin (ECOTRIN) 81 MG EC tablet Take 1 tablet (81 mg total) by mouth once daily.    atorvastatin (LIPITOR) 20 MG tablet TAKE 1 TABLET (20 MG TOTAL) BY MOUTH ONCE DAILY.    butalbital-acetaminophen-caffeine -40 mg (FIORICET, ESGIC) -40 mg per tablet Daily prn HA    calcium carbonate (OS-UMAIR) 600 mg (1,500 mg) Tab Take 600 mg by mouth once daily.    clopidogreL (PLAVIX) 75 mg tablet Take 1 tablet (75 mg total) by mouth once daily.    cyproheptadine (PERIACTIN) 4 mg tablet Take 1 tablet (4 mg total) by mouth 2 (two) times daily.    fluticasone furoate-vilanteroL (BREO) 100-25 mcg/dose diskus inhaler Inhale 1 puff into the  lungs once daily. Controller    hydrALAZINE (APRESOLINE) 25 MG tablet Take 1 tablet by mouth 2 (two) times daily.    meclizine (ANTIVERT) 12.5 mg tablet Take 1 tablet (12.5 mg total) by mouth 3 (three) times daily as needed for Dizziness.    metoprolol succinate (TOPROL-XL) 50 MG 24 hr tablet Take 1 tablet by mouth once daily.    nitroGLYCERIN (NITROSTAT) 0.4 MG SL tablet Place 1 tablet (0.4 mg total) under the tongue every 5 (five) minutes as needed for Chest pain (x3 if not better to ER).    ondansetron (ZOFRAN-ODT) 4 MG TbDL Take 1 tablet (4 mg total) by mouth every 8 (eight) hours as needed (nausea).    pantoprazole (PROTONIX) 40 MG tablet Take 1 tablet (40 mg total) by mouth 2 (two) times daily before meals. (Patient not taking: Reported on 12/6/2021)    paroxetine (PAXIL) 20 MG tablet Take 1 tablet (20 mg total) by mouth every morning.    sodium bicarbonate 650 MG tablet Take 2 tablets by mouth 2 (two) times a day.    sucralfate (CARAFATE) 1 gram tablet TAKE ONE TABLET BY MOUTH THREE TIMES DAILY BEFORE MEALS AND BEDTIME    traMADoL (ULTRAM) 50 mg tablet Take 1 tablet by mouth every 6 (six) hours as needed.    umeclidinium-vilanteroL (ANORO ELLIPTA) 62.5-25 mcg/actuation DsDv Controller       Review of patient's allergies indicates:   Allergen Reactions    Dulcolax [bisacodyl] Other (See Comments)     Sweating a lot, weakness    Docusate sodium      Other reaction(s): Makes sweat profusely; Weak    Dulcagen        Past Medical History:   Diagnosis Date    Carotid artery occlusion     Chronic diastolic CHF (congestive heart failure)     COPD (chronic obstructive pulmonary disease)     Coronary artery disease     Depression     End-stage renal disease on hemodialysis     History of gastric ulcer     Hyperlipidemia     Hypertension     Migraine headache     Myocardial infarction     Osteoarthritis     Peripheral artery disease      Past Surgical History:   Procedure Laterality Date     APPENDECTOMY  unknown    BLADDER SUSPENSION N/A 2005    with Mesh    CARDIAC CATHETERIZATION  2009    [facility] w/[facility]    CAROTID ENDARTERECTOMY Right 6/1/2021    Procedure: ENDARTERECTOMY-CAROTID;  Surgeon: Joseph Bello MD;  Location: [facility];  Service: Peripheral Vascular;  Laterality: Right;    CATHETERIZATION OF BOTH LEFT AND RIGHT HEART  06/25/2020    Right brachial accessed    CHOLECYSTECTOMY N/A 2017    COLONOSCOPY N/A 10/5/2020    Procedure: COLONOSCOPY;  Surgeon: Merlin Keller MD;  Location: [facility];  Service: Endoscopy;  Laterality: N/A;    CREATION OF AXILLARY-FEMORAL ARTERY BYPASS WITH GRAFT Right 8/20/2019    Procedure: CREATION, BYPASS, ARTERIAL, AXILLARY TO FEMORAL, USING GRAFT;  Surgeon: Joseph Bello MD;  Location: [facility];  Service: Cardiovascular;  Laterality: Right;    ESOPHAGOGASTRODUODENOSCOPY N/A 10/5/2020    Procedure: EGD (ESOPHAGOGASTRODUODENOSCOPY);  Surgeon: Merlin Keller MD;  Location: [facility];  Service: Endoscopy;  Laterality: N/A;    FISTULOGRAM Left 8/8/2019    Procedure: FISTULOGRAM;  Surgeon: Calixto Javed MD;  Location: [facility] CATH LAB;  Service: Nephrology;  Laterality: Left;    FISTULOGRAM Left 5/19/2020    Procedure: FISTULOGRAM;  Surgeon: Janell Delgado MD;  Location: [facility] CATH LAB;  Service: Nephrology;  Laterality: Left;    HYSTERECTOMY      LEFT HEART CATHETERIZATION Right 6/25/2020    Procedure: Left heart cath;  Surgeon: Alex Mejía MD;  Location: [facility] CATH LAB;  Service: Cardiology;  Laterality: Right;  brachial access    LEFT HEART CATHETERIZATION Right 10/26/2020    Procedure: CATHETERIZATION, HEART, LEFT;  Surgeon: Lester De La Fuente MD;  Location: [facility] CATH LAB;  Service: Cardiology;  Laterality: Right;    NEPHRECTOMY Right 1977    OOPHORECTOMY       Family History     Problem Relation (Age of Onset)    Cancer Mother, Father    Heart attack Father    Heart disease Father    Heart failure Father    Hypertension Father         Tobacco Use    Smoking status: Light Tobacco Smoker     Packs/day: 0.25     Years: 50.00     Pack years: 12.50     Types: Cigarettes     Start date: 1962    Smokeless tobacco: Never Used    Tobacco comment: Do not smoke day of surgery   Substance and Sexual Activity    Alcohol use: Not Currently     Comment: holidays    Drug use: No    Sexual activity: Not Currently     Partners: Male     Review of Systems   Constitutional: Negative for fever.   HENT: Negative.    Eyes: Negative.    Respiratory: Positive for shortness of breath.    Cardiovascular: Negative.    Gastrointestinal: Negative.    Endocrine: Negative.    Genitourinary: Negative.    Musculoskeletal: Negative.    Skin: Negative.    Allergic/Immunologic: Negative.    Neurological: Negative.    Hematological: Negative.    Psychiatric/Behavioral: Negative.      Objective:     Vital Signs (Most Recent):  Temp: 97.8 °F (36.6 °C) (12/29/21 1602)  Pulse: 78 (12/29/21 1602)  Resp: 18 (12/29/21 1602)  BP: (!) 149/67 (12/29/21 1602)  SpO2: (!) 91 % (12/29/21 1602) Vital Signs (24h Range):  Temp:  [97 °F (36.1 °C)-97.8 °F (36.6 °C)] 97.8 °F (36.6 °C)  Pulse:  [] 78  Resp:  [18-22] 18  SpO2:  [91 %-98 %] 91 %  BP: ()/(37-98) 149/67     Weight: 41.3 kg (91 lb 0.8 oz)  Body mass index is 17.2 kg/m².      Intake/Output Summary (Last 24 hours) at 12/29/2021 1642  Last data filed at 12/29/2021 1347  Gross per 24 hour   Intake 500 ml   Output 2500 ml   Net -2000 ml       Physical Exam  Constitutional:       General: She is not in acute distress.     Appearance: Normal appearance. She is not ill-appearing, toxic-appearing or diaphoretic.   HENT:      Head: Normocephalic.      Nose: Nose normal.   Eyes:      Conjunctiva/sclera: Conjunctivae normal.   Cardiovascular:      Rate and Rhythm: Normal rate and regular rhythm.   Pulmonary:      Effort: Pulmonary effort is normal.      Breath sounds: Wheezing present.      Comments: Diminished on the  right  Abdominal:      Palpations: Abdomen is soft.   Musculoskeletal:         General: Normal range of motion.      Cervical back: Normal range of motion.   Skin:     General: Skin is warm.   Neurological:      General: No focal deficit present.      Mental Status: She is alert.   Psychiatric:         Mood and Affect: Mood normal.         Significant Labs:  CBC:   Recent Labs   Lab 12/29/21 0210   WBC 7.80   RBC 3.26*   HGB 9.4*   HCT 32.5*      *   MCH 28.8   MCHC 28.9*     CMP:   Recent Labs   Lab 12/29/21 0210   GLU 81   CALCIUM 10.0   ALBUMIN 3.1*   PROT 7.3      K 3.8   CO2 13*   *   BUN 56*   CREATININE 4.6*   ALKPHOS 117   ALT 7*   AST 11   BILITOT 0.3     Coagulation: No results for input(s): PT, INR, APTT in the last 48 hours.  Lactic Acid: No results for input(s): LACTATE in the last 48 hours.    Significant Diagnostics:  Chest x-ray reviewed.  Large right pleural effusion    Assessment/Plan:     Active Diagnoses:    Diagnosis Date Noted POA    PRINCIPAL PROBLEM:  ESRD (end stage renal disease) on dialysis [N18.6, Z99.2] 03/14/2018 Not Applicable    Pleural effusion [J90] 06/11/2020 Yes    Moderate malnutrition [E44.0] 02/22/2018 Yes    COPD (chronic obstructive pulmonary disease) [J44.9] 02/21/2018 Yes    Tobacco abuse [Z72.0] 11/08/2012 Yes    Hyperlipidemia [E78.5] 11/08/2012 Yes    Hypertension due to end stage renal disease on dialysis [I12.0, N18.6, Z99.2] 11/08/2012 Not Applicable      Problems Resolved During this Admission:     71-year-old female with a chronic right-sided effusion related to heart failure and end-stage renal disease.  She has issues going to dialysis and her center is sent her away for shortness of breath.  It was recommended that she have a PleurX catheter placed for intermittent pleural drainage to help aid her symptoms.  The procedure is purely palliative.    Will plan on placement tomorrow in the OR under fluoroscopic guidance.  NPO at  midnight      Thank you for your consult. I will follow-up with patient. Please contact us if you have any additional questions.    Tay Jernigan MD  General Surgery  [facility]

## 2021-12-29 NOTE — ASSESSMENT & PLAN NOTE
Contributing Nutrition Diagnosis  Moderate chronic condition related malnutrition    Related to (etiology):   Decreased appetite    Signs and Symptoms (as evidenced by):   1) PO intakes < 75% of meals x > 1 month  2) mild-moderate wasting as charted below    Interventions:  above    Nutrition Diagnosis Status:   new

## 2021-12-29 NOTE — ASSESSMENT & PLAN NOTE
Chronic Problem  Dangers of cigarette smoking were reviewed with patient in detail for 10 minutes and patient was encouraged to quit. Nicotine replacement options were discussed.

## 2021-12-29 NOTE — PLAN OF CARE
Goals to be met by: 1/12/22    Patient will increase functional independence with ADLs by performing:    UE Dressing with Modified Mobile.  LE Dressing with Modified Mobile.  Grooming while standing at sink with Modified Mobile.  Toileting from toilet with Modified Mobile for hygiene and clothing management.   Bathing from  shower chair/bench with Modified Mobile.  Toilet transfer to toilet with Modified Mobile.  Increased strength and functional activity tolerance for ADL's/IADL's as evidenced by requiring less assistance with these tasks

## 2021-12-29 NOTE — CONSULTS
"[facility]  Nephrology  Consult Note    Patient Name: Radha Jurado  MRN: [id number]  Admission Date: 12/29/2021  Hospital Length of Stay: 0 days  Attending Provider: Emelia Delgadillo MD   Primary Care Physician: Jeovany Carpio MD  Principal Problem:ESRD (end stage renal disease) on dialysis    Inpatient consult to Nephrology  Consult performed by: ELIU Tirado  Consult ordered by: Calin Hopkins NP        Subjective:     HPI:   Radha Jurado is a 71 year old  female with a past medical history significant for ESRD , hypertension, hyperlipidemia, remote history of MI, coronary artery disease, COPD, chronic diastolic congestive heart failure, PVD,  Hepatitis-C, and osteoarthritis. She presented to the emergency department reporting shortness of breath.  Patient has missed her last of 5 hemodialysis sessions d/t "not feeling well". Denied transportation issue to this provider (offered assistance is she was having issues).  Laboratory evaluation upon arrival revealed a serum sodium of 140, potassium 3.8, chloride 112, CO2 seen, BUN 56, creatinine 4.6, GFR 9, glucose 81, calcium 10.0, albumin 3.1, BNP 3832, troponin is 0.044, hemoglobin 9.4, hematocrit 32.5, WBC 7.8, platelet count 306.  Nephrology has been consulted for hemodialysis management while in the inpatient setting.    Past Medical History:   Diagnosis Date    Carotid artery occlusion     Chronic diastolic CHF (congestive heart failure)     COPD (chronic obstructive pulmonary disease)     Coronary artery disease     Depression     End-stage renal disease on hemodialysis     History of gastric ulcer     Hyperlipidemia     Hypertension     Migraine headache     Myocardial infarction     Osteoarthritis     Peripheral artery disease        Past Surgical History:   Procedure Laterality Date    APPENDECTOMY  unknown    BLADDER SUSPENSION N/A 2005    with Mesh    CARDIAC CATHETERIZATION  2009    MI w/PCI @ [facility] "    CAROTID ENDARTERECTOMY Right 6/1/2021    Procedure: ENDARTERECTOMY-CAROTID;  Surgeon: Joseph Bello MD;  Location: [facility] OR;  Service: Peripheral Vascular;  Laterality: Right;    CATHETERIZATION OF BOTH LEFT AND RIGHT HEART  06/25/2020    Right brachial accessed    CHOLECYSTECTOMY N/A 2017    COLONOSCOPY N/A 10/5/2020    Procedure: COLONOSCOPY;  Surgeon: Merlin Keller MD;  Location: [facility] ENDO;  Service: Endoscopy;  Laterality: N/A;    CREATION OF AXILLARY-FEMORAL ARTERY BYPASS WITH GRAFT Right 8/20/2019    Procedure: CREATION, BYPASS, ARTERIAL, AXILLARY TO FEMORAL, USING GRAFT;  Surgeon: Joseph Bello MD;  Location: [facility] OR;  Service: Cardiovascular;  Laterality: Right;    ESOPHAGOGASTRODUODENOSCOPY N/A 10/5/2020    Procedure: EGD (ESOPHAGOGASTRODUODENOSCOPY);  Surgeon: Merlin Keller MD;  Location: [facility] ENDO;  Service: Endoscopy;  Laterality: N/A;    FISTULOGRAM Left 8/8/2019    Procedure: FISTULOGRAM;  Surgeon: Calixto Javed MD;  Location: [facility] CATH LAB;  Service: Nephrology;  Laterality: Left;    FISTULOGRAM Left 5/19/2020    Procedure: FISTULOGRAM;  Surgeon: Janell Delgado MD;  Location: [facility] CATH LAB;  Service: Nephrology;  Laterality: Left;    HYSTERECTOMY      LEFT HEART CATHETERIZATION Right 6/25/2020    Procedure: Left heart cath;  Surgeon: Alex Mejía MD;  Location: [facility] CATH LAB;  Service: Cardiology;  Laterality: Right;  brachial access    LEFT HEART CATHETERIZATION Right 10/26/2020    Procedure: CATHETERIZATION, HEART, LEFT;  Surgeon: Lester De La Fuente MD;  Location: [facility] CATH LAB;  Service: Cardiology;  Laterality: Right;    NEPHRECTOMY Right 1977    OOPHORECTOMY         Review of patient's allergies indicates:   Allergen Reactions    Dulcolax [bisacodyl] Other (See Comments)     Sweating a lot, weakness    Docusate sodium      Other reaction(s): Makes sweat profusely; Weak    Dulcagen      Current Facility-Administered Medications   Medication Frequency     acetaminophen tablet 650 mg Q4H PRN    acetaminophen tablet 650 mg Q6H PRN    albuterol-ipratropium 2.5 mg-0.5 mg/3 mL nebulizer solution 3 mL Q4H PRN    aluminum-magnesium hydroxide-simethicone 200-200-20 mg/5 mL suspension 30 mL QID PRN    amLODIPine tablet 10 mg Daily    atorvastatin tablet 20 mg Daily    cyproheptadine 4 mg tablet 4 mg BID    dextrose 50% injection 12.5 g PRN    dextrose 50% injection 25 g PRN    glucagon (human recombinant) injection 1 mg PRN    glucose chewable tablet 16 g PRN    glucose chewable tablet 24 g PRN    hydrALAZINE tablet 25 mg BID    magnesium oxide tablet 800 mg PRN    magnesium oxide tablet 800 mg PRN    melatonin tablet 9 mg Nightly PRN    metoprolol succinate (TOPROL-XL) 24 hr tablet 50 mg Daily    mupirocin 2 % ointment BID    naloxone 0.4 mg/mL injection 0.02 mg PRN    nitroGLYCERIN SL tablet 0.4 mg Q5 Min PRN    ondansetron injection 4 mg Q8H PRN    potassium, sodium phosphates 280-160-250 mg packet 2 packet PRN    potassium, sodium phosphates 280-160-250 mg packet 2 packet PRN    potassium, sodium phosphates 280-160-250 mg packet 2 packet PRN    simethicone chewable tablet 80 mg QID PRN    sodium chloride 0.9% flush 10 mL Q8H PRN     Facility-Administered Medications Ordered in Other Encounters   Medication Frequency    0.9%  NaCl infusion Continuous     Family History     Problem Relation (Age of Onset)    Cancer Mother, Father    Heart attack Father    Heart disease Father    Heart failure Father    Hypertension Father        Tobacco Use    Smoking status: Light Tobacco Smoker     Packs/day: 0.25     Years: 50.00     Pack years: 12.50     Types: Cigarettes     Start date: 1962    Smokeless tobacco: Never Used    Tobacco comment: Do not smoke day of surgery   Substance and Sexual Activity    Alcohol use: Not Currently     Comment: holidays    Drug use: No    Sexual activity: Not Currently     Partners: Male     Review of Systems    Constitutional: Positive for appetite change and fatigue.   Respiratory: Positive for cough, shortness of breath and wheezing.    Neurological: Positive for weakness.     Objective:     Vital Signs (Most Recent):  Temp: 97 °F (36.1 °C) (12/29/21 0748)  Pulse: 94 (12/29/21 0844)  Resp: 18 (12/29/21 0748)  BP: (!) 198/74 (12/29/21 0844)  SpO2: 96 % (12/29/21 0748)  O2 Device (Oxygen Therapy): nasal cannula (12/29/21 0814) Vital Signs (24h Range):  Temp:  [97 °F (36.1 °C)-97.5 °F (36.4 °C)] 97 °F (36.1 °C)  Pulse:  [] 94  Resp:  [18-22] 18  SpO2:  [91 %-98 %] 96 %  BP: (139-198)/(55-98) 198/74     Weight: 41.3 kg (91 lb) (12/29/21 0137)  Body mass index is 17.19 kg/m².  Body surface area is 1.33 meters squared.    No intake/output data recorded.    Physical Exam  Vitals and nursing note reviewed.   Constitutional:       Appearance: She is ill-appearing.   HENT:      Head: Normocephalic and atraumatic.      Mouth/Throat:      Mouth: Mucous membranes are moist.   Neck:      Vascular: JVD present.   Cardiovascular:      Rate and Rhythm: Normal rate and regular rhythm.      Pulses: Normal pulses.      Heart sounds: Normal heart sounds. No friction rub. No gallop.    Pulmonary:      Effort: Pulmonary effort is normal.      Breath sounds: Wheezing and rales present.   Abdominal:      General: Abdomen is flat. Bowel sounds are normal. There is no distension.      Palpations: Abdomen is soft.      Tenderness: There is no abdominal tenderness. There is no guarding or rebound.   Musculoskeletal:         General: Normal range of motion.      Cervical back: Normal range of motion. No rigidity.      Right lower leg: No edema.      Left lower leg: No edema.   Skin:     General: Skin is warm.      Capillary Refill: Capillary refill takes less than 2 seconds.      Findings: No bruising, erythema, lesion or rash.   Neurological:      General: No focal deficit present.      Mental Status: She is alert and oriented to person,  place, and time. Mental status is at baseline.      Motor: Weakness present.   Psychiatric:         Mood and Affect: Mood normal.         Behavior: Behavior normal.         Thought Content: Thought content normal.         Judgment: Judgment normal.         Significant Labs:  ABGs: No results for input(s): PH, PCO2, HCO3, POCSATURATED, BE in the last 168 hours.  BMP:   Recent Labs   Lab 12/29/21 0210   GLU 81      K 3.8   *   CO2 13*   BUN 56*   CREATININE 4.6*   CALCIUM 10.0     Cardiac Markers: No results for input(s): CKMB, TROPONINT, MYOGLOBIN in the last 168 hours.  CBC:   Recent Labs   Lab 12/29/21 0210   WBC 7.80   RBC 3.26*   HGB 9.4*   HCT 32.5*      *   MCH 28.8   MCHC 28.9*     CMP:   Recent Labs   Lab 12/29/21 0210   GLU 81   CALCIUM 10.0   ALBUMIN 3.1*   PROT 7.3      K 3.8   CO2 13*   *   BUN 56*   CREATININE 4.6*   ALKPHOS 117   ALT 7*   AST 11   BILITOT 0.3     Coagulation: No results for input(s): PT, INR, APTT in the last 168 hours.  LFTs:   Recent Labs   Lab 12/29/21 0210   ALT 7*   AST 11   ALKPHOS 117   BILITOT 0.3   PROT 7.3   ALBUMIN 3.1*     Microbiology Results (last 7 days)     ** No results found for the last 168 hours. **        Specimen (24h ago, onward)            None        PTH: No results for input(s): PTH in the last 168 hours.  TSH: No results for input(s): TSH in the last 168 hours.  No results for input(s): COLORU, CLARITYU, SPECGRAV, PHUR, PROTEINUA, GLUCOSEU, BILIRUBINCON, BLOODU, WBCU, RBCU, BACTERIA, MUCUS, NITRITE, LEUKOCYTESUR, UROBILINOGEN, HYALINECASTS in the last 168 hours.  All labs within the past 24 hours have been reviewed.    Significant Imaging:  EXAMINATION:  XR CHEST AP PORTABLE     CLINICAL HISTORY:  CHF;     TECHNIQUE:  Single frontal view of the chest was performed.     COMPARISON:  Chest of December 13, 2021     FINDINGS:  There remains a large right pleural effusion and infiltrate in the right perihilar and a with  opacification of the right middle and right lower lobes.  Cardiac size is obscured.  The left lung appears clear.     Impression:     Continued large right pleural effusion with infiltrate and atelectasis in the right middle lobe and right lower lobe.  Right perihilar infiltrate remains        Electronically signed by: Danielito Wong MD  Date:                                            12/29/2021  Time:                                           07:52    Assessment/Plan:     Active Diagnoses:    Diagnosis Date Noted POA    PRINCIPAL PROBLEM:  ESRD (end stage renal disease) on dialysis [N18.6, Z99.2] 03/14/2018 Not Applicable    Pleural effusion [J90] 06/11/2020 Yes    Malnutrition of mild degree [E44.1] 02/22/2018 Yes     Chronic    COPD (chronic obstructive pulmonary disease) [J44.9] 02/21/2018 Yes    Tobacco abuse [Z72.0] 11/08/2012 Yes    Hyperlipidemia [E78.5] 11/08/2012 Yes    Hypertension due to end stage renal disease on dialysis [I12.0, N18.6, Z99.2] 11/08/2012 Not Applicable      Problems Resolved During this Admission:     ESRD on HD  Maintain HD  Target UF 1-3 L as tolerated. Seen and examined on iHD tolerating treatment without issue or complication.   Plan for repeat iHD in am.     Hypervolemia  Target UF of 1-3 L UF as tolerated  Likely will plan for repeat iHD in am    Right Pleural Effusion  Large right pleural effusion with infiltrate and atelectasis in the right middle lobe and right lower lobe  Recommend pulmonology consult/possible thoracentesis as this fluid will not be removed with iHD/UF  Defer to PCP    Hypertension  Uncontrolled.   Will attempt to remove fluid today and monitor BP post ultrafiltration.   Maintained on amlodipine , hydralazine, and Toprol XL  Will adjust regimen as needed.     Macrocytic Anemia  Hgb below goal at 9.4  Continue HANS--> start at 50 units/kg 3x weekly.   Check B12 and folate levels    Renal MBD  cCa 10.7  Check PO4 and PTH levels  Adjust to low calcium  bath with iHD     HLD  Continue statin therapy  Maintained on atorvastation 20 mg daily    COPD  Current daily smoker  Encouraged smoking cessation.   Plan per PCP    Thank you for your consult. Will follow with you. See above assessment and plan. Further recommendations pending clinical course. Seen and examined at bedside with Dr. Gil.     Phyllis Crenshaw, ELIU  Nephrology  [facility]

## 2021-12-29 NOTE — HPI
Radha Jurado is a 71-year-old female who presents to the emergency room this morning complaining of shortness of breath.  She reports shortness of breath has been present for the past approximately 1 week and has progressively worsened.  She denies any fever or chills.  No known sick contacts or travel.  She is vaccinated for COVID.  She reports she has missed her last 5 hemodialysis sessions due to not feeling well and also social issues regarding transportation.  Previous medical history includes chronic diastolic heart failure, coronary artery disease, dialysis, hep C, hyperlipidemia, and active smoker.  ER workup:  CBC with baseline anemia.  CMP with BUN of 56, creatinine of 4.6, bicarb of 13. BNP 3832. Troponin mildly elevated 0.04.  Chest x-ray demonstrates cardiomegaly a with large right pleural effusion.  She is scheduled to follow-up with pulmonology for possible bronchoscopy.  Patient admitted to Hospital Medicine for observation management.  Will consult Nephrology for dialysis today.

## 2021-12-29 NOTE — ASSESSMENT & PLAN NOTE
Chronic Problem  Chronic, controlled.  Latest blood pressure and vitals reviewed-   Temp:  [97.5 °F (36.4 °C)]   Pulse:  [92-98]   Resp:  [22]   BP: (139-194)/(55-98)   SpO2:  [91 %-98 %] .   Home meds for hypertension were reviewed and noted below.   Hypertension Medications             amLODIPine (NORVASC) 10 MG tablet Take 10 mg by mouth once daily.    hydrALAZINE (APRESOLINE) 25 MG tablet Take 1 tablet by mouth 2 (two) times daily.    metoprolol succinate (TOPROL-XL) 50 MG 24 hr tablet Take 1 tablet by mouth once daily.    nitroGLYCERIN (NITROSTAT) 0.4 MG SL tablet Place 1 tablet (0.4 mg total) under the tongue every 5 (five) minutes as needed for Chest pain (x3 if not better to ER).          While in the hospital, will manage blood pressure as follows; Continue home antihypertensive regimen    Will utilize p.r.n. blood pressure medication only if patient's blood pressure greater than  180/110 and she develops symptoms such as worsening chest pain or shortness of breath.

## 2021-12-29 NOTE — CARE UPDATE
Chart reviewed.  Patient with ESRD who is followed by Dr. Cruz Orosco [place].  Has not received dialysis for 5 days.  Significant acidosis and borderline anion gap probably from the lack of dialysis.  Dialysis orders written and full consult to follow.

## 2021-12-29 NOTE — CONSULTS
[facility]  Adult Nutrition  Consult Note    SUMMARY    Intervention: sodium modified diet and nutrition education    Recommendation:   1) Change diet to Renal, low sodium   2) Add arginaid daily + boost pudding chocolate BID   3) weigh s/p HD   4) Nutrition education verbally reviewed    Goals: 1) PO Intakes > 50% of meals and supplements at f/u  Nutrition Goal Status: new  Communication of RD Recs: reviewed with physician (POC, kojo note)    Assessment and Plan    Moderate malnutrition  Contributing Nutrition Diagnosis  Moderate chronic condition related malnutrition    Related to (etiology):   Decreased appetite    Signs and Symptoms (as evidenced by):   1) PO intakes < 75% of meals x > 1 month  2) mild-moderate wasting as charted below    Interventions:  above    Nutrition Diagnosis Status:   new           Malnutrition Assessment     Skin (Micronutrient):  (no wounds noted)  Teeth (Micronutrient):  (denies chewing trouble)   Micronutrient Evaluation: suspected deficiency  Micronutrient Evaluation Comments: check iron   Weight Loss (Malnutrition):  (13% x 9 months)  Energy Intake (Malnutrition): less than 75% for greater than or equal to 1 month   Orbital Region (Subcutaneous Fat Loss): mild depletion  Upper Arm Region (Subcutaneous Fat Loss): moderate depletion  Thoracic and Lumbar Region: well nourished   Mormon Region (Muscle Loss): well nourished  Clavicle Bone Region (Muscle Loss): mild depletion  Clavicle and Acromion Bone Region (Muscle Loss): mild depletion  Scapular Bone Region (Muscle Loss): mild depletion  Dorsal Hand (Muscle Loss): mild depletion  Patellar Region (Muscle Loss): moderate depletion  Anterior Thigh Region (Muscle Loss): moderate depletion  Posterior Calf Region (Muscle Loss): moderate depletion   Edema (Fluid Accumulation): 0-->no edema present   Subcutaneous Fat Loss (Final Summary): moderate protein-calorie malnutrition  Muscle Loss Evaluation (Final Summary):  "moderate protein-calorie malnutrition         Reason for Assessment    Reason For Assessment: consult  Diagnosis:  (ESRD on HD)  Relevant Medical History: ESRD on HD, CHF, CAD, HLD, smoking  Interdisciplinary Rounds: did not attend    General Information Comments: 70 y/o female admitted with SOB x 1 week s/p missing HD x the last 5 days, tells me she was too weak to get up and go. NFPE done 21 mild-moderate wasting seen. PTA eating only 2 small meals/day, dislikes traditional milky protein shakes, she's agreeable to pudding and boost breeze. I encouraged pt to try supplements at home or eat another small meal during the day. Wt stable.    Nutrition Discharge Planning: To be determined- Renal low sodium diet    Nutrition Risk Screen    Nutrition Risk Screen: no indicators present    Nutrition/Diet History    Patient Reported Diet/Restrictions/Preferences: renal  Spiritual, Cultural Beliefs, Rastafarian Practices, Values that Affect Care: no  Food Allergies: NKFA  Factors Affecting Nutritional Intake: None identified at this time    Anthropometrics    Temp: 97.3 °F (36.3 °C)  Height Method: Stated  Height: 5' 1"  Height (inches): 61 in  Weight Method: Bed Scale  Weight: 41.3 kg (91 lb 0.8 oz)  Weight (lb): 91.05 lb  Ideal Body Weight (IBW), Female: 105 lb  % Ideal Body Weight, Female (lb): 86.71 %  BMI (Calculated): 17.2  BMI Grade: 17 - 18.4 protein-energy malnutrition grade I  Usual Body Weight (UBW), k kg (3/25/21)  Weight Change Amount:  (45-48 kg 3/25/21, 42 kg 21)  % Usual Body Weight: 86.22  % Weight Change From Usual Weight: -13.96 %       Lab/Procedures/Meds    Pertinent Labs Reviewed: reviewed  BMP  Lab Results   Component Value Date     2021    K 3.8 2021     (H) 2021    CO2 13 (L) 2021    BUN 56 (H) 2021    CREATININE 4.6 (H) 2021    CALCIUM 10.0 2021    ANIONGAP 15 2021    ESTGFRAFRICA 10 (A) 2021    EGFRNONAA 9 (A) 2021 "     Lab Results   Component Value Date    CALCIUM 10.0 12/29/2021    PHOS 5.5 (H) 12/13/2021     Lab Results   Component Value Date    ALBUMIN 3.1 (L) 12/29/2021       Pertinent Medications Reviewed: reviewed  Pertinent Medications Comments: statin, zofran, KNaPhos    Estimated/Assessed Needs    Weight Used For Calorie Calculations: 41.3 kg (91 lb 0.8 oz)  Energy Calorie Requirements (kcal): 35 kcal/kg ( HD/ wt gain) = 1445 kcal  Energy Need Method: Kcal/kg  Protein Requirements: 1.2-1.5 g protein/kg ( HD/wasting) = 49-62 g  Weight Used For Protein Calculations: 41.3 kg (91 lb 0.8 oz)  Fluid Requirements (mL): 1500 ml or per MD  Estimated Fluid Requirement Method: RDA Method  CHO Requirement: N/A      Nutrition Prescription Ordered    Current Diet Order: Low sodium    Evaluation of Received Nutrient/Fluid Intake    Energy Calories Required: not meeting needs  Protein Required: not meeting needs  Fluid Required: not meeting needs  Tolerance: tolerating  % Intake of Estimated Energy Needs: 25-50%  % Meal Intake: 25-50%    Nutrition Risk    Level of Risk/Frequency of Follow-up: moderate 2 x weekly      Monitor and Evaluation    Food and Nutrient Intake: energy intake,food and beverage intake  Food and Nutrient Adminstration: diet order  Anthropometric Measurements: weight  Biochemical Data, Medical Tests and Procedures: electrolyte and renal panel,gastrointestinal profile,glucose/endocrine profile  Nutrition-Focused Physical Findings: overall appearance       Nutrition Follow-Up    RD Follow-up?: Yes

## 2021-12-29 NOTE — CONSULTS
12/29/2021      Admit Date: 12/29/2021  Radha Jurado  New Patient Consult    Chief Complaint   Patient presents with    Shortness of Breath     Patient reporting shortness of breath. Patient has missed 5 HD appointments since last week       History of Present Illness:  Pt is a 70 yo female with ESRD on HD, CAD, CHF, HTN, COPD, carotid and peripheral artery disease, chronic recurrent R pleural effusion who has seen me in clinic (last seen 11/9/21). Recently she has difficulty being compliant with dialysis and has recurring shortness of breath with fluid overload requiring hospital admission. She was just discharged 12/14 for similar presentation. Her chronic right pleural effusion has increased in size this month. She is on aspirin and plavix for combination of reasons including coronary artery disease and carotid artery stent placed earlier this year. These have been held in the past for sampling of the pleural fluid- studies transudative.  Her  is at bedside and assists w/ history. Pt has had intermittent difficulty with SOB so bad she cannot leave the house and can't be dialyzed- states dialysis center will refuse to dialyze her and recommend going to ED. On Thurs or Fri last week he noticed she was very short of breath so started a z pack that pcp had given her. On Monday she went to HD but they called an ambulance because she was so short of breath then pt refused to go to the ED. She has missed HD he says a total of 3 times. She does have cough with intermittent yellow phlegm which is chronic. She smokes 2 cigarettes daily. Breathing does feel better with her nebs and inhalers but sometimes forgets to take them. States last time she took asa and plavix was Monday (12/27).      PFSH:  Past Medical History:   Diagnosis Date    Carotid artery occlusion     Chronic diastolic CHF (congestive heart failure)     COPD (chronic obstructive pulmonary disease)     Coronary artery disease      Depression     End-stage renal disease on hemodialysis     History of gastric ulcer     Hyperlipidemia     Hypertension     Migraine headache     Myocardial infarction     Osteoarthritis     Peripheral artery disease      Past Surgical History:   Procedure Laterality Date    APPENDECTOMY  unknown    BLADDER SUSPENSION N/A 2005    with Mesh    CARDIAC CATHETERIZATION  2009    [facility]    CAROTID ENDARTERECTOMY Right 6/1/2021    Procedure: ENDARTERECTOMY-CAROTID;  Surgeon: Joseph Bello MD;  [facility];  Service: Peripheral Vascular;  Laterality: Right;    CATHETERIZATION OF BOTH LEFT AND RIGHT HEART  06/25/2020    Right brachial accessed    CHOLECYSTECTOMY N/A 2017    COLONOSCOPY N/A 10/5/2020    Procedure: COLONOSCOPY;  Surgeon: Merlin Keller MD;  Location: [facility];  Service: Endoscopy;  Laterality: N/A;    CREATION OF AXILLARY-FEMORAL ARTERY BYPASS WITH GRAFT Right 8/20/2019    Procedure: CREATION, BYPASS, ARTERIAL, AXILLARY TO FEMORAL, USING GRAFT;  Surgeon: Joseph Bello MD;  Location: [facility];  Service: Cardiovascular;  Laterality: Right;    ESOPHAGOGASTRODUODENOSCOPY N/A 10/5/2020    Procedure: EGD (ESOPHAGOGASTRODUODENOSCOPY);  Surgeon: Merlin Keller MD;  Location: [facility];  Service: Endoscopy;  Laterality: N/A;    FISTULOGRAM Left 8/8/2019    Procedure: FISTULOGRAM;  Surgeon: Calixto Javed MD;  Location: [facility] CATH LAB;  Service: Nephrology;  Laterality: Left;    FISTULOGRAM Left 5/19/2020    Procedure: FISTULOGRAM;  Surgeon: Janell Delgado MD;  Location: [facility] CATH LAB;  Service: Nephrology;  Laterality: Left;    HYSTERECTOMY      LEFT HEART CATHETERIZATION Right 6/25/2020    Procedure: Left heart cath;  Surgeon: Alex Mejía MD;  [facility];  Service: Cardiology;  Laterality: Right;  brachial access    LEFT HEART CATHETERIZATION Right 10/26/2020    Procedure: CATHETERIZATION, HEART, LEFT;  Surgeon: Lester De La Fuente MD;   "Location: [facility] C[facility] LAB;  Service: Cardiology;  Laterality: Right;    NEPHRECTOMY Right 1977    OOPHORECTOMY       Social History     Tobacco Use    Smoking status: Light Tobacco Smoker     Packs/day: 0.25     Years: 50.00     Pack years: 12.50     Types: Cigarettes     Start date: 1962    Smokeless tobacco: Never Used    Tobacco comment: Do not smoke day of surgery   Substance Use Topics    Alcohol use: Not Currently     Comment: holidays    Drug use: No     Family History   Problem Relation Age of Onset    Cancer Mother     Hypertension Father     Heart attack Father     Heart disease Father     Heart failure Father     Cancer Father      Review of patient's allergies indicates:   Allergen Reactions    Dulcolax [bisacodyl] Other (See Comments)     Sweating a lot, weakness    Docusate sodium      Other reaction(s): Makes sweat profusely; Weak    Dulcagen        Performance Status:Performance Status:The patient's activity level is mobility with asit devices. Uses walker    Review of Systems:  a review of eleven systems covering constitutional, Psych, Eye, HEENT, Respiratory, Cardiac, GI, , Musculoskeletal, Endocrine, Dermatologicwas negative except the above mentioned abnormalities and for any pertinent findings as listed below:  Weakness  Eats very little      Exam:Comprehensive exam done. BP (!) 198/74   Pulse 94   Temp 97 °F (36.1 °C) (Oral)   Resp 18   Ht 5' 1" (1.549 m)   Wt 41.3 kg (91 lb)   LMP  (LMP Unknown)   SpO2 96%   BMI 17.19 kg/m²   Exam included Vitals as listed, and patient's appearance and affect and alertness and mood, oral exam for yeast and hygiene and pharynx lesions and Mallapatti (M) score, neck with inspection for jvd and masses and thyroid abnormalities and lymph nodes (supraclavicular and infraclavicular nodes also examined and noted if abn), chest exam included symmetry and effort and fremitus and percussion and auscultation, cardiac exam included rhythm and " gallops and murmur and rubs and jvd and edema, abdominal exam for mass and hepatosplenomegaly and tenderness and hernias and bowel sounds, Musculoskeletal exam with muscle tone and posture and mobility/gait and  strenght, and skin for rashes and cyanosis and pallor and turgor, extremity for clubbing.  Findings were normal except as listed below:  Awake, alert, chronically ill and frail  LUE fistula  HR regular  R diminished breath sounds, dull to percussion  No edema    Radiographs reviewed: view by direct vision   CXR 12/29- moderate-large R effusion, hilar congestion      Labs     Recent Labs   Lab 12/29/21 0210   WBC 7.80   HGB 9.4*   HCT 32.5*        Recent Labs   Lab 12/29/21 0210      K 3.8   *   CO2 13*   BUN 56*   CREATININE 4.6*   GLU 81   CALCIUM 10.0   AST 11   ALT 7*   ALKPHOS 117   BILITOT 0.3   PROT 7.3   ALBUMIN 3.1*   TROPONINI 0.044*   BNP 3,832*   No results for input(s): PH, PCO2, PO2, HCO3 in the last 24 hours.  Microbiology Results (last 7 days)     ** No results found for the last 168 hours. **          Impression:  Active Hospital Problems    Diagnosis  POA    *ESRD (end stage renal disease) on dialysis [N18.6, Z99.2]  Not Applicable    Pleural effusion [J90]  Yes    Malnutrition of mild degree [E44.1]  Yes     Chronic    COPD (chronic obstructive pulmonary disease) [J44.9]  Yes    Tobacco abuse [Z72.0]  Yes    Hyperlipidemia [E78.5]  Yes    Hypertension due to end stage renal disease on dialysis [I12.0, N18.6, Z99.2]  Not Applicable      Resolved Hospital Problems   No resolved problems to display.               Plan:     - fluid removal with HD preferred method of volume management which should help keep the pleural effusion down as well  - if she is not able to adhere to dialysis regimen a pleurX catheter with home drainage may be a reasonable way to palliate the effusion and hopefully keep her out of the hospital  - d/w patient and  and they prefer to  proceed with pleurX placement- earliest recommended would be Friday (5d off plavix).. will consult surgery for placement  - hold plavix and aspirin     Carmela Ahumada MD  Pulmonary & Critical Care Medicine

## 2021-12-29 NOTE — CARE UPDATE
12/29/21 0814   Patient Assessment/Suction   Level of Consciousness (AVPU) alert   PRE-TX-O2   O2 Device (Oxygen Therapy) nasal cannula   $ Is the patient on Low Flow Oxygen? Yes   Flow (L/min) 2  (Home regimen)   Pulse Oximetry Type Intermittent   $ Pulse Oximetry - Multiple Charge Pulse Oximetry - Multiple   Aerosol Therapy   $ Aerosol Therapy Charges PRN treatment not required

## 2021-12-29 NOTE — ED PROVIDER NOTES
Encounter Date: 12/29/2021       History     Chief Complaint   Patient presents with    Shortness of Breath     Patient reporting shortness of breath. Patient has missed 5 HD appointments since last week     HPI patient is 71-year-old woman who presents emergency department complaining of progressive shortness of breath over the past week.  She has missed her previous 5 hemodialysis appointments.  She went on Monday they told her to come to the hospital.  Review of patient's allergies indicates:   Allergen Reactions    Dulcolax [bisacodyl] Other (See Comments)     Sweating a lot, weakness    Docusate sodium      Other reaction(s): Makes sweat profusely; Weak    Dulcagen      Past Medical History:   Diagnosis Date    Carotid artery occlusion     Chronic diastolic CHF (congestive heart failure)     COPD (chronic obstructive pulmonary disease)     Coronary artery disease     Depression     End-stage renal disease on hemodialysis     History of gastric ulcer     Hyperlipidemia     Hypertension     Migraine headache     Myocardial infarction     Osteoarthritis     Peripheral artery disease      Past Surgical History:   Procedure Laterality Date    APPENDECTOMY  unknown    BLADDER SUSPENSION N/A 2005    with Mesh    CARDIAC CATHETERIZATION  2009    [facility] w/PCI [facility]    CAROTID ENDARTERECTOMY Right 6/1/2021    Procedure: ENDARTERECTOMY-CAROTID;  Surgeon: Joseph Bello MD;  Location: [facility] OR;  Service: Peripheral Vascular;  Laterality: Right;    CATHETERIZATION OF BOTH LEFT AND RIGHT HEART  06/25/2020    Right brachial accessed    CHOLECYSTECTOMY N/A 2017    COLONOSCOPY N/A 10/5/2020    Procedure: COLONOSCOPY;  Surgeon: Merlin Keller MD;  Location: [facility];  Service: Endoscopy;  Laterality: N/A;    CREATION OF AXILLARY-FEMORAL ARTERY BYPASS WITH GRAFT Right 8/20/2019    Procedure: CREATION, BYPASS, ARTERIAL, AXILLARY TO FEMORAL, USING GRAFT;  Surgeon: Joseph Bello MD;  Location:  [facility] OR;  Service: Cardiovascular;  Laterality: Right;    ESOPHAGOGASTRODUODENOSCOPY N/A 10/5/2020    Procedure: EGD (ESOPHAGOGASTRODUODENOSCOPY);  Surgeon: Merlin Keller MD;  Location: [facility] [place] ENDO;  Service: Endoscopy;  Laterality: N/A;    FISTULOGRAM Left 8/8/2019    Procedure: FISTULOGRAM;  Surgeon: Calixto Javed MD;  Location: [facility] CATH LAB;  Service: Nephrology;  Laterality: Left;    FISTULOGRAM Left 5/19/2020    Procedure: FISTULOGRAM;  Surgeon: Janell Delgado MD;  Location: [facility] CATH LAB;  Service: Nephrology;  Laterality: Left;    HYSTERECTOMY      LEFT HEART CATHETERIZATION Right 6/25/2020    Procedure: Left heart cath;  Surgeon: Alex Mejía MD;  Location: [facility] [place] CATH LAB;  Service: Cardiology;  Laterality: Right;  brachial access    LEFT HEART CATHETERIZATION Right 10/26/2020    Procedure: CATHETERIZATION, HEART, LEFT;  Surgeon: Lester De La Fuente MD;  Location: [facility] [place] CATH LAB;  Service: Cardiology;  Laterality: Right;    NEPHRECTOMY Right 1977    OOPHORECTOMY       Family History   Problem Relation Age of Onset    Cancer Mother     Hypertension Father     Heart attack Father     Heart disease Father     Heart failure Father     Cancer Father      Social History     Tobacco Use    Smoking status: Light Tobacco Smoker     Packs/day: 0.25     Years: 50.00     Pack years: 12.50     Types: Cigarettes     Start date: 1962    Smokeless tobacco: Never Used    Tobacco comment: Do not smoke day of surgery   Substance Use Topics    Alcohol use: Not Currently     Comment: holidays    Drug use: No     Review of Systems   Constitutional: Negative for fever.   HENT: Negative for sore throat.    Respiratory: Positive for shortness of breath.    Cardiovascular: Negative for chest pain.   Gastrointestinal: Negative for nausea.   Genitourinary: Negative for dysuria.   Musculoskeletal: Negative for back pain.   Skin: Negative for rash.   Neurological: Negative for weakness.   Hematological: Does not  bruise/bleed easily.       Physical Exam     Initial Vitals [12/29/21 0137]   BP Pulse Resp Temp SpO2   (!) 194/81 92 (!) 22 97.5 °F (36.4 °C) (!) 91 %      MAP       --         Physical Exam    Constitutional: She appears well-developed and well-nourished.  Non-toxic appearance. No distress.   HENT:   Head: Normocephalic and atraumatic.   Eyes: EOM are normal. Pupils are equal, round, and reactive to light.   Neck: Neck supple. No JVD present.   Normal range of motion.  Cardiovascular: Normal rate, regular rhythm, normal heart sounds and intact distal pulses. Exam reveals no gallop and no friction rub.    No murmur heard.  Pulmonary/Chest: Tachypnea noted. She is in respiratory distress (mild). She has wheezes. She has no rhonchi. She has no rales.   Abdominal: Abdomen is soft. Bowel sounds are normal. She exhibits no distension. There is no abdominal tenderness. There is no rebound and no guarding.   Musculoskeletal:         General: Normal range of motion.      Cervical back: Normal range of motion and neck supple. No rigidity.     Neurological: She is alert and oriented to person, place, and time. She has normal strength and normal reflexes. No cranial nerve deficit or sensory deficit. She exhibits normal muscle tone. Coordination normal. GCS eye subscore is 4. GCS verbal subscore is 5. GCS motor subscore is 6.   Skin: Skin is warm and dry.   Psychiatric: She has a normal mood and affect. Her speech is normal and behavior is normal. She is not actively hallucinating.         ED Course   Procedures  Labs Reviewed   CBC W/ AUTO DIFFERENTIAL - Abnormal; Notable for the following components:       Result Value    RBC 3.26 (*)     Hemoglobin 9.4 (*)     Hematocrit 32.5 (*)      (*)     MCHC 28.9 (*)     RDW 16.2 (*)     Lymph # 0.6 (*)     Gran % 82.9 (*)     Lymph % 7.2 (*)     All other components within normal limits   COMPREHENSIVE METABOLIC PANEL - Abnormal; Notable for the following components:     Chloride 112 (*)     CO2 13 (*)     BUN 56 (*)     Creatinine 4.6 (*)     Albumin 3.1 (*)     ALT 7 (*)     eGFR if  10 (*)     eGFR if non  9 (*)     All other components within normal limits   TROPONIN I - Abnormal; Notable for the following components:    Troponin I 0.044 (*)     All other components within normal limits   B-TYPE NATRIURETIC PEPTIDE - Abnormal; Notable for the following components:    BNP 3,832 (*)     All other components within normal limits   SARS-COV-2 RNA AMPLIFICATION, QUAL        ECG Results          EKG 12-lead (Final result)  Result time 12/29/21 14:40:33    Final result by Interface, Lab In [facility] (12/29/21 14:40:33)                 Narrative:    Test Reason : R06.02,    Vent. Rate : 096 BPM     Atrial Rate : 096 BPM     P-R Int : 220 ms          QRS Dur : 082 ms      QT Int : 374 ms       P-R-T Axes : 023 134 042 degrees     QTc Int : 472 ms    Sinus rhythm  Left posterior fascicular block  Abnormal ECG  When compared with ECG of 13-DEC-2021 19:41,  No significant change was found  Confirmed by Dia HERRERA, Mike LAIRD ([id number]) on 12/29/2021 2:40:25 PM    Referred By: [email or web address]   SELF           Confirmed By:Mike Alvares MD                            Imaging Results          X-Ray Chest AP Portable (Final result)  Result time 12/29/21 07:52:51    Final result by Danielito Wong Jr., MD (12/29/21 07:52:51)                 Impression:      Continued large right pleural effusion with infiltrate and atelectasis in the right middle lobe and right lower lobe.  Right perihilar infiltrate remains      Electronically signed by: Danielito Wong MD  Date:    12/29/2021  Time:    07:52             Narrative:    EXAMINATION:  XR CHEST AP PORTABLE    CLINICAL HISTORY:  CHF;    TECHNIQUE:  Single frontal view of the chest was performed.    COMPARISON:  Chest of December 13, 2021    FINDINGS:  There remains a large right pleural effusion and infiltrate in the  right perihilar and a with opacification of the right middle and right lower lobes.  Cardiac size is obscured.  The left lung appears clear.                                 Medications   sodium chloride 0.9% flush 10 mL (has no administration in time range)   magnesium oxide tablet 800 mg (has no administration in time range)   magnesium oxide tablet 800 mg (has no administration in time range)   potassium, sodium phosphates 280-160-250 mg packet 2 packet (has no administration in time range)   potassium, sodium phosphates 280-160-250 mg packet 2 packet (has no administration in time range)   potassium, sodium phosphates 280-160-250 mg packet 2 packet (has no administration in time range)   acetaminophen tablet 650 mg (650 mg Oral Given 12/29/21 1607)   ondansetron injection 4 mg (has no administration in time range)   glucose chewable tablet 16 g (has no administration in time range)   glucose chewable tablet 24 g (has no administration in time range)   dextrose 50% injection 12.5 g (has no administration in time range)   dextrose 50% injection 25 g (has no administration in time range)   glucagon (human recombinant) injection 1 mg (has no administration in time range)   albuterol-ipratropium 2.5 mg-0.5 mg/3 mL nebulizer solution 3 mL (has no administration in time range)   melatonin tablet 9 mg (has no administration in time range)   simethicone chewable tablet 80 mg (has no administration in time range)   aluminum-magnesium hydroxide-simethicone 200-200-20 mg/5 mL suspension 30 mL (has no administration in time range)   naloxone 0.4 mg/mL injection 0.02 mg (has no administration in time range)   acetaminophen tablet 650 mg ( Oral Canceled Entry 12/29/21 1606)   amLODIPine tablet 10 mg (10 mg Oral Given 12/29/21 0844)   atorvastatin tablet 20 mg (20 mg Oral Given 12/29/21 0844)   cyproheptadine 4 mg tablet 4 mg (4 mg Oral Given 12/29/21 2038)   hydrALAZINE tablet 25 mg (25 mg Oral Given 12/29/21 2038)   metoprolol  succinate (TOPROL-XL) 24 hr tablet 50 mg (50 mg Oral Given 12/29/21 0844)   nitroGLYCERIN SL tablet 0.4 mg (has no administration in time range)   mupirocin 2 % ointment ( Nasal Not Given 12/29/21 2100)     Medical Decision Making:   History:   Old Medical Records: I decided to obtain old medical records.  Initial Assessment:   Patient is 71-year-old woman who presents emergency department complaining of progressive shortness of breath over the past week.  She has missed her previous 5 hemodialysis appointments.  She went on Monday they told her to come to the hospital.  ED Management:  Patient volume overloaded with large right pleural effusion.  Discussed with Hospital Medicine who agrees to admit the patient for hemodialysis and removal of volume since she has had symptoms of shortness of breath.                      Clinical Impression:   Final diagnoses:  [R06.02] Shortness of breath  [E87.70] Hypervolemia, unspecified hypervolemia type (Primary)          ED Disposition Condition    Admit               Everett Bernard MD  12/29/21 4082

## 2021-12-30 NOTE — ASSESSMENT & PLAN NOTE
Chronic Problem  Dangers of cigarette smoking were reviewed with patient in detail for 3 minutes and patient was encouraged to quit. Nicotine replacement options were discussed. She declines

## 2021-12-30 NOTE — PLAN OF CARE
Patient brought to preop via own bed, plan of care reviewed and warm blanket provided. Tele monitor removed per room nurse prior to transport

## 2021-12-30 NOTE — PROGRESS NOTES
[facility] Hospital Medicine  Progress Note    Patient Name: Radha Jurado  MRN: [id number]  Patient Class: IP- Inpatient   Admission Date: 12/29/2021  Length of Stay: 1 days  Attending Physician: Emelia Delgadillo MD  Primary Care Provider: Jeovany Carpio MD        Subjective:     Principal Problem:ESRD (end stage renal disease) on dialysis        HPI:  Radha Jurado is a 71-year-old female who presents to the emergency room this morning complaining of shortness of breath.  She reports shortness of breath has been present for the past approximately 1 week and has progressively worsened.  She denies any fever or chills.  No known sick contacts or travel.  She is vaccinated for COVID.  She reports she has missed her last 5 hemodialysis sessions due to not feeling well and also social issues regarding transportation.  Previous medical history includes chronic diastolic heart failure, coronary artery disease, dialysis, hep C, hyperlipidemia, and active smoker.  ER workup:  CBC with baseline anemia.  CMP with BUN of 56, creatinine of 4.6, bicarb of 13. BNP 3832. Troponin mildly elevated 0.04.  Chest x-ray demonstrates cardiomegaly a with large right pleural effusion.  She is scheduled to follow-up with pulmonology for possible bronchoscopy.  Patient admitted to Hospital Medicine for observation management.  Will consult Nephrology for dialysis today.      Overview/Hospital Course:  No notes on file    Interval History: Patient seen and examined after pleurx placement.  Reports mild pain at insertion site worse with movement.  No N/V.  SOB improved per patient.  Discussed with surgeon. Plan for repeat CXR tomorrow and d/c to home with home health.  Will dialyze again tomorrow.    Review of Systems   Constitutional: Positive for fatigue. Negative for chills and fever.   HENT: Negative for congestion, postnasal drip and trouble swallowing.    Respiratory: Positive for shortness of breath (improved post  pleurx). Negative for cough and wheezing.    Cardiovascular: Positive for chest pain (at pleurx insertion site). Negative for leg swelling.   Gastrointestinal: Negative for abdominal pain, nausea and vomiting.     Objective:     Vital Signs (Most Recent):  Temp: 97.6 °F (36.4 °C) (12/30/21 1425)  Pulse: (!) 55 (12/30/21 1500)  Resp: 17 (12/30/21 1425)  BP: (!) 116/49 (12/30/21 1500)  SpO2: 95 % (12/30/21 1413) Vital Signs (24h Range):  Temp:  [96.6 °F (35.9 °C)-97.9 °F (36.6 °C)] 97.6 °F (36.4 °C)  Pulse:  [55-93] 55  Resp:  [12-22] 17  SpO2:  [87 %-100 %] 95 %  BP: (105-149)/(48-80) 116/49     Weight: 41.3 kg (91 lb)  Body mass index is 17.19 kg/m².    Intake/Output Summary (Last 24 hours) at 12/30/2021 1516  Last data filed at 12/30/2021 1405  Gross per 24 hour   Intake 97.67 ml   Output 1 ml   Net 96.67 ml      Physical Exam  Vitals and nursing note reviewed.   Constitutional:       Appearance: Normal appearance. She is ill-appearing (chronically ill appearing).   HENT:      Head: Normocephalic and atraumatic.      Mouth/Throat:      Mouth: Mucous membranes are moist.      Pharynx: Oropharynx is clear.   Eyes:      Extraocular Movements: Extraocular movements intact.      Conjunctiva/sclera: Conjunctivae normal.      Pupils: Pupils are equal, round, and reactive to light.   Cardiovascular:      Rate and Rhythm: Normal rate and regular rhythm.      Pulses: Normal pulses.   Pulmonary:      Effort: Pulmonary effort is normal. No respiratory distress.      Comments: RIGHT SQ emphysema palpable  Pleurx dressing CDI  Abdominal:      General: Abdomen is flat. Bowel sounds are normal. There is no distension.      Palpations: Abdomen is soft.      Tenderness: There is no abdominal tenderness.   Skin:     General: Skin is warm and dry.      Capillary Refill: Capillary refill takes less than 2 seconds.   Neurological:      General: No focal deficit present.      Mental Status: She is alert.   Psychiatric:         Mood and  Affect: Mood normal.         Significant Labs:   All pertinent labs within the past 24 hours have been reviewed.  CBC:   Recent Labs   Lab 12/29/21  0210 12/30/21  0813   WBC 7.80 6.34   HGB 9.4* 9.2*   HCT 32.5* 31.1*    240     CMP:   Recent Labs   Lab 12/29/21  0210 12/30/21  0813    143   K 3.8 4.0   * 107   CO2 13* 23   GLU 81 86   BUN 56* 29*   CREATININE 4.6* 3.6*   CALCIUM 10.0 8.3*   PROT 7.3  --    ALBUMIN 3.1* 2.6*   BILITOT 0.3  --    ALKPHOS 117  --    AST 11  --    ALT 7*  --    ANIONGAP 15 13   EGFRNONAA 9* 12*       Significant Imaging: I have reviewed all pertinent imaging results/findings within the past 24 hours.      Assessment/Plan:      * ESRD (end stage renal disease) on dialysis  Acute on chronic problem  Nephrology consulted.   Continue Chronic hemodialysis.   Monitor daily electrolytes and defer dialysis orders to nephrology.  Plan for HD again tomorrow prior to d/c.      Pleural effusion  Chronic problem  -Pulmonology following  -S/P RIGHT pleurx cath placement  -Will need education prior to d/c on use  -Repeat CXR in AM: Small pneumo discussed with surgeon- not unexpected finding post-procedurally.          Moderate malnutrition  Chronic Problem  Nutrition consulted. Body mass index is 17.19 kg/m².. Encourage maximal PO intake. Diet supplementation ordered per nutrition approval. Will encourage PO and monitor closely for weight changes.      COPD (chronic obstructive pulmonary disease)  Chronic Problem  Patient's COPD is controlled currently.  Patient is currently off COPD Pathway. Continue scheduled inhalers Supplemental oxygen and monitor respiratory status closely.       Hypertension due to end stage renal disease on dialysis  Chronic Problem  Chronic, controlled.  Latest blood pressure and vitals reviewed-   Temp:  [96.6 °F (35.9 °C)-97.9 °F (36.6 °C)]   Pulse:  [55-93]   Resp:  [12-22]   BP: (105-149)/(48-80)   SpO2:  [87 %-100 %] .   Home meds for hypertension  were reviewed and noted below.   Hypertension Medications             amLODIPine (NORVASC) 10 MG tablet Take 10 mg by mouth once daily.    hydrALAZINE (APRESOLINE) 25 MG tablet Take 1 tablet by mouth 2 (two) times daily.    metoprolol succinate (TOPROL-XL) 50 MG 24 hr tablet Take 1 tablet by mouth once daily.    nitroGLYCERIN (NITROSTAT) 0.4 MG SL tablet Place 1 tablet (0.4 mg total) under the tongue every 5 (five) minutes as needed for Chest pain (x3 if not better to ER).          While in the hospital, will manage blood pressure as follows; Continue home antihypertensive regimen    Will utilize p.r.n. blood pressure medication only if patient's blood pressure greater than  180/110 and she develops symptoms such as worsening chest pain or shortness of breath.      Hyperlipidemia   Patient is chronically on statin.will continue for now. Monitor clinically. Last LDL was   Lab Results   Component Value Date    LDLCALC 21.6 (L) 05/25/2021            Tobacco abuse  Chronic Problem  Dangers of cigarette smoking were reviewed with patient in detail for 3 minutes and patient was encouraged to quit. Nicotine replacement options were discussed. She declines      VTE Risk Mitigation (From admission, onward)         Ordered     Place CARMEN hose  Until discontinued         12/29/21 0526     IP VTE HIGH RISK PATIENT  Once         12/29/21 0526     Place sequential compression device  Until discontinued         12/29/21 0526                Discharge Planning   JUAN: 12/31/2021     Code Status: Full Code   Is the patient medically ready for discharge?:     Reason for patient still in hospital (select all that apply): Patient trending condition, Treatment and Consult recommendations  Discharge Plan A: Home with family                  April Spain NP  Department of Hospital Medicine   [facility]

## 2021-12-30 NOTE — PROGRESS NOTES
"[facility]  Nephrology  Consult Note    Patient Name: Radha Jurado  MRN: [id number]  Admission Date: 12/29/2021  Hospital Length of Stay: 1 days  Attending Provider: Emelia Delgadillo MD   Primary Care Physician: Jeovany Carpio MD  Principal Problem:ESRD (end stage renal disease) on dialysis      Subjective:     HPI:   Radha Jurado is a 71 year old  female with a past medical history significant for ESRD , hypertension, hyperlipidemia, remote history of MI, coronary artery disease, COPD, chronic diastolic congestive heart failure, PVD,  Hepatitis-C, and osteoarthritis. She presented to the emergency department reporting shortness of breath.  Patient  missed her last of 5 hemodialysis sessions d/t "not feeling well". Denied transportation  (offered assistance is she was having issues).  Laboratory evaluation upon arrival revealed a serum sodium of 140, potassium 3.8, chloride 112, CO2 seen, BUN 56, creatinine 4.6, GFR 9, glucose 81, calcium 10.0, albumin 3.1, BNP 3832, troponin is 0.044, hemoglobin 9.4, hematocrit 32.5, WBC 7.8, platelet count 306.  Nephrology consulted for hemodialysis management while in the inpatient setting.    Interval History:  Tolerated hemodialysis treatment yesterday without issue or complication. Reports improvement in SOB. Remains with decreased breath sounds R>L and wheezing. Plans for thoracentesis today for large right pleural effusion. Remains with significant acidosis. Plan to repeat iHD today. Run high bicarb bath and monitor.      Past Medical History:   Diagnosis Date    Carotid artery occlusion     Chronic diastolic CHF (congestive heart failure)     COPD (chronic obstructive pulmonary disease)     Coronary artery disease     Depression     End-stage renal disease on hemodialysis     History of gastric ulcer     Hyperlipidemia     Hypertension     Migraine headache     Myocardial infarction     Osteoarthritis     Peripheral artery disease  "       Past Surgical History:   Procedure Laterality Date    APPENDECTOMY  unknown    BLADDER SUSPENSION N/A 2005    with Mesh    CARDIAC CATHETERIZATION  2009    [facility] w/[facility]    CAROTID ENDARTERECTOMY Right 6/1/2021    Procedure: ENDARTERECTOMY-CAROTID;  Surgeon: Joseph Bello MD;  [place] [facility];  Service: Peripheral Vascular;  Laterality: Right;    CATHETERIZATION OF BOTH LEFT AND RIGHT HEART  06/25/2020    Right brachial accessed    CHOLECYSTECTOMY N/A 2017    COLONOSCOPY N/A 10/5/2020    Procedure: COLONOSCOPY;  Surgeon: Merlin Keller MD;  Location: [facility];  Service: Endoscopy;  Laterality: N/A;    CREATION OF AXILLARY-FEMORAL ARTERY BYPASS WITH GRAFT Right 8/20/2019    Procedure: CREATION, BYPASS, ARTERIAL, AXILLARY TO FEMORAL, USING GRAFT;  Surgeon: Joseph Bello MD;  [place] [facility];  Service: Cardiovascular;  Laterality: Right;    ESOPHAGOGASTRODUODENOSCOPY N/A 10/5/2020    Procedure: EGD (ESOPHAGOGASTRODUODENOSCOPY);  Surgeon: Merlin Keller MD;  Location: [facility];  Service: Endoscopy;  Laterality: N/A;    FISTULOGRAM Left 8/8/2019    Procedure: FISTULOGRAM;  Surgeon: Calixto Javed MD;  Location: [facility] CATH LAB;  Service: Nephrology;  Laterality: Left;    FISTULOGRAM Left 5/19/2020    Procedure: FISTULOGRAM;  Surgeon: Janell Delgado MD;  Location: [facility] CATH LAB;  Service: Nephrology;  Laterality: Left;    HYSTERECTOMY      LEFT HEART CATHETERIZATION Right 6/25/2020    Procedure: Left heart cath;  Surgeon: Alex Mejía MD;  Location: [facility] CATH LAB;  Service: Cardiology;  Laterality: Right;  brachial access    LEFT HEART CATHETERIZATION Right 10/26/2020    Procedure: CATHETERIZATION, HEART, LEFT;  Surgeon: Lester De La Fuente MD;  Location: [facility] CATH LAB;  Service: Cardiology;  Laterality: Right;    NEPHRECTOMY Right 1977    OOPHORECTOMY         Review of patient's allergies indicates:   Allergen Reactions    Dulcolax [bisacodyl] Other (See Comments)      Sweating a lot, weakness    Docusate sodium      Other reaction(s): Makes sweat profusely; Weak    Dulcagen      Current Facility-Administered Medications   Medication Frequency    acetaminophen tablet 650 mg Q4H PRN    acetaminophen tablet 650 mg Q6H PRN    albuterol-ipratropium 2.5 mg-0.5 mg/3 mL nebulizer solution 3 mL Q4H PRN    aluminum-magnesium hydroxide-simethicone 200-200-20 mg/5 mL suspension 30 mL QID PRN    amLODIPine tablet 10 mg Daily    atorvastatin tablet 20 mg Daily    cyproheptadine 4 mg tablet 4 mg BID    dextrose 50% injection 12.5 g PRN    dextrose 50% injection 25 g PRN    glucagon (human recombinant) injection 1 mg PRN    glucose chewable tablet 16 g PRN    glucose chewable tablet 24 g PRN    hydrALAZINE tablet 25 mg BID    magnesium oxide tablet 800 mg PRN    magnesium oxide tablet 800 mg PRN    melatonin tablet 9 mg Nightly PRN    metoprolol succinate (TOPROL-XL) 24 hr tablet 50 mg Daily    mupirocin 2 % ointment BID    naloxone 0.4 mg/mL injection 0.02 mg PRN    nitroGLYCERIN SL tablet 0.4 mg Q5 Min PRN    ondansetron injection 4 mg Q8H PRN    potassium, sodium phosphates 280-160-250 mg packet 2 packet PRN    potassium, sodium phosphates 280-160-250 mg packet 2 packet PRN    potassium, sodium phosphates 280-160-250 mg packet 2 packet PRN    simethicone chewable tablet 80 mg QID PRN    sodium chloride 0.9% flush 10 mL Q8H PRN     Facility-Administered Medications Ordered in Other Encounters   Medication Frequency    0.9%  NaCl infusion Continuous     Family History     Problem Relation (Age of Onset)    Cancer Mother, Father    Heart attack Father    Heart disease Father    Heart failure Father    Hypertension Father        Tobacco Use    Smoking status: Light Tobacco Smoker     Packs/day: 0.25     Years: 50.00     Pack years: 12.50     Types: Cigarettes     Start date: 1962    Smokeless tobacco: Never Used    Tobacco comment: Do not smoke day of surgery    Substance and Sexual Activity    Alcohol use: Not Currently     Comment: holidays    Drug use: No    Sexual activity: Not Currently     Partners: Male     Review of Systems   Constitutional: Positive for appetite change and fatigue.   Respiratory: Positive for cough and wheezing.    Neurological: Positive for weakness.     Objective:     Vital Signs (Most Recent):  Temp: 96.8 °F (36 °C) (12/30/21 0740)  Pulse: 76 (12/30/21 0740)  Resp: 16 (12/30/21 0740)  BP: (!) 147/65 (12/30/21 0740)  SpO2: 96 % (12/30/21 0740)  O2 Device (Oxygen Therapy): nasal cannula (12/29/21 1935) Vital Signs (24h Range):  Temp:  [96.6 °F (35.9 °C)-97.8 °F (36.6 °C)] 96.8 °F (36 °C)  Pulse:  [63-93] 76  Resp:  [16-20] 16  SpO2:  [87 %-97 %] 96 %  BP: ()/(37-76) 147/65     Weight: 41.3 kg (91 lb 0.8 oz) (12/29/21 1238)  Body mass index is 17.2 kg/m².  Body surface area is 1.33 meters squared.    I/O last 3 completed shifts:  In: 500 [Other:500]  Out: 2500 [Other:2500]    Physical Exam  Vitals and nursing note reviewed.   Constitutional:       Appearance: She is ill-appearing.   HENT:      Head: Normocephalic and atraumatic.      Mouth/Throat:      Mouth: Mucous membranes are moist.   Neck:      Vascular: JVD present.   Cardiovascular:      Rate and Rhythm: Normal rate and regular rhythm.      Pulses: Normal pulses.      Heart sounds: Normal heart sounds. No friction rub. No gallop.    Pulmonary:      Effort: Pulmonary effort is normal.      Breath sounds: Wheezing and rales present.   Abdominal:      General: Abdomen is flat. Bowel sounds are normal. There is no distension.      Palpations: Abdomen is soft.      Tenderness: There is no abdominal tenderness. There is no guarding or rebound.   Musculoskeletal:         General: Normal range of motion.      Cervical back: Normal range of motion. No rigidity.      Right lower leg: No edema.      Left lower leg: No edema.   Skin:     General: Skin is warm.      Capillary Refill: Capillary  refill takes less than 2 seconds.      Findings: No bruising, erythema, lesion or rash.   Neurological:      General: No focal deficit present.      Mental Status: She is alert and oriented to person, place, and time. Mental status is at baseline.      Motor: Weakness present.   Psychiatric:         Mood and Affect: Mood normal.         Behavior: Behavior normal.         Thought Content: Thought content normal.         Judgment: Judgment normal.         Significant Labs:  ABGs: No results for input(s): PH, PCO2, HCO3, POCSATURATED, BE in the last 168 hours.  BMP:   Recent Labs   Lab 12/29/21  0210   GLU 81      K 3.8   *   CO2 13*   BUN 56*   CREATININE 4.6*   CALCIUM 10.0     Cardiac Markers: No results for input(s): CKMB, TROPONINT, MYOGLOBIN in the last 168 hours.  CBC:   Recent Labs   Lab 12/30/21  0813   WBC 6.34   RBC 3.15*   HGB 9.2*   HCT 31.1*      MCV 99*   MCH 29.2   MCHC 29.6*     CMP:   Recent Labs   Lab 12/29/21 0210   GLU 81   CALCIUM 10.0   ALBUMIN 3.1*   PROT 7.3      K 3.8   CO2 13*   *   BUN 56*   CREATININE 4.6*   ALKPHOS 117   ALT 7*   AST 11   BILITOT 0.3     Coagulation: No results for input(s): PT, INR, APTT in the last 168 hours.  LFTs:   Recent Labs   Lab 12/29/21 0210   ALT 7*   AST 11   ALKPHOS 117   BILITOT 0.3   PROT 7.3   ALBUMIN 3.1*     Microbiology Results (last 7 days)     ** No results found for the last 168 hours. **        Specimen (24h ago, onward)            None        PTH: No results for input(s): PTH in the last 168 hours.  TSH: No results for input(s): TSH in the last 168 hours.  No results for input(s): COLORU, CLARITYU, SPECGRAV, PHUR, PROTEINUA, GLUCOSEU, BILIRUBINCON, BLOODU, WBCU, RBCU, BACTERIA, MUCUS, NITRITE, LEUKOCYTESUR, UROBILINOGEN, HYALINECASTS in the last 168 hours.  All labs within the past 24 hours have been reviewed.    Significant Imaging:  EXAMINATION:  XR CHEST AP PORTABLE     CLINICAL  HISTORY:  CHF;     TECHNIQUE:  Single frontal view of the chest was performed.     COMPARISON:  Chest of December 13, 2021     FINDINGS:  There remains a large right pleural effusion and infiltrate in the right perihilar and a with opacification of the right middle and right lower lobes.  Cardiac size is obscured.  The left lung appears clear.     Impression:     Continued large right pleural effusion with infiltrate and atelectasis in the right middle lobe and right lower lobe.  Right perihilar infiltrate remains        Electronically signed by: Danielito Wong MD  Date:                                            12/29/2021  Time:                                           07:52    Assessment/Plan:     Active Diagnoses:    Diagnosis Date Noted POA    PRINCIPAL PROBLEM:  ESRD (end stage renal disease) on dialysis [N18.6, Z99.2] 03/14/2018 Not Applicable    Pleural effusion [J90] 06/11/2020 Yes    Moderate malnutrition [E44.0] 02/22/2018 Yes    COPD (chronic obstructive pulmonary disease) [J44.9] 02/21/2018 Yes    Tobacco abuse [Z72.0] 11/08/2012 Yes    Hyperlipidemia [E78.5] 11/08/2012 Yes    Hypertension due to end stage renal disease on dialysis [I12.0, N18.6, Z99.2] 11/08/2012 Not Applicable      Problems Resolved During this Admission:     ESRD on HD  Maintain HD schedule  Stable iHD session yesterday with 2.5 net UF   Plan for repeat treatment this am with 1-3 L UF as tolerated     Hypervolemia  Manage with HD/UF  Target UF of 1-3 L UF as tolerated planned today    Right Pleural Effusion  Large right pleural effusion with infiltrate and atelectasis in the right middle lobe and right lower lobe  Recommend pulmonology consult/possible thoracentesis as this fluid will not be removed with iHD/UF  Defer to PCP    Hypertension  Improved   Maintained on amlodipine , hydralazine, and Toprol XL  Continue to challenge UF and monitor.     Macrocytic Anemia  Hgb below goal at 9.2  Retacrit 50 unit/kg IV x 1 dose  today  Check B12 and folate levels    Renal MBD  cCa 10.7  Check PO4 and PTH levels  Adjust to low calcium bath with iHD     Metabolic Acidosis  Likely d/t noncompliance with HD regimen.   Run 40 bicarb bath.    HLD  Continue statin therapy  Maintained on atorvastation 20 mg daily    COPD  Current daily smoker  Encouraged smoking cessation.   Plan per PCP    Thank you for your consult. Will follow with you. See above assessment and plan. Further recommendations pending clinical course. Seen and examined at bedside with Dr. Gil.     ELIU Tirado  Nephrology  [facility]

## 2021-12-30 NOTE — PLAN OF CARE
VSS, pain tolerable after pain meds given, dressing to R chest cdi, tolerated sips of water, chest xray done, ubaldo SCDs on. Ok per Dr. Bryson to transfer the pt back to the floor. Pt transported via bed back to room 221. Call light within reach. Report given to SUHAS Monroe.

## 2021-12-30 NOTE — OP NOTE
[facility]  Surgery Department  Operative Note    SUMMARY     Date of Procedure: 12/30/2021     Procedure: Procedure(s) (LRB):  Thoracentesis, VATS, Pleurex (Right)     Surgeon(s) and Role:     * Tay Jernigan MD - Primary    Assisting Surgeon:ZE Recio    Pre-Operative Diagnosis: Shortness of breath [R06.02]    Post-Operative Diagnosis: Post-Op Diagnosis Codes:     * Shortness of breath [R06.02]    Anesthesia: General    Operative Findings (including complications, if any):  Thora scopic assisted PleurX catheter placement on the right    Description of Technical Procedures:  This is a 71-year-old female who has had a chronic right-sided effusion related to renal and cardiac failure.  It was recommended by her pulmonologist that she have a PleurX catheter placed for intermittent pleural drainage to palliate her symptoms.  She did consent to the procedure.  She was taken to the OR, placed supine, general endotracheal anesthesia was induced, she was placed in left lateral decubitus position, the right chest was prepped and draped in the usual fashion, and a time-out was completed.  At the 4th-5th costal interspace, a 1 cm incision was made.  Subcutaneous fat and intercostal muscle was divided using electrocautery down to the pleura.  The chest cavity was entered bluntly and a 5 mm trocar was placed.   There was some simple appearing pleural fluid.  The fissure appeared slightly inflamed.  A PleurX catheter was tunneled from the lower lateral chest wall to the 5th 6 interspace in the anterior axillary line.  A 1 cm incision was placed at the planned entry site.  A split sheath dilator was placed into the chest cavity under direct visualization.  The catheter was placed down the sheath and the sheath was then removed.  The tip of the catheter was sitting in the inferior portion of the chest.  It was connected to suction and 500 cc of pleural fluid were drained.  Skin was then closed in layers with  3-0 Vicryl and a running 4-0 Monocryl.  The PleurX catheter was secured to the skin using a 2-0 nylon suture.  A dressing was applied.  Patient tolerated the entire procedure without apparent complication, she was extubated in the OR, brought to recovery in stable condition.  All counts were correct.    Significant Surgical Tasks Conducted by the Assistant(s), if Applicable:  Assist    Estimated Blood Loss (EBL):  Minimal           Implants: * No implants in log *    Specimens:   Specimen (24h ago, onward)            None                  Condition: Good    Disposition: PACU - hemodynamically stable.    Attestation: I was present and scrubbed for the entire procedure.

## 2021-12-30 NOTE — ASSESSMENT & PLAN NOTE
Chronic Problem  Chronic, controlled.  Latest blood pressure and vitals reviewed-   Temp:  [96.6 °F (35.9 °C)-97.9 °F (36.6 °C)]   Pulse:  [55-93]   Resp:  [12-22]   BP: (105-149)/(48-80)   SpO2:  [87 %-100 %] .   Home meds for hypertension were reviewed and noted below.   Hypertension Medications             amLODIPine (NORVASC) 10 MG tablet Take 10 mg by mouth once daily.    hydrALAZINE (APRESOLINE) 25 MG tablet Take 1 tablet by mouth 2 (two) times daily.    metoprolol succinate (TOPROL-XL) 50 MG 24 hr tablet Take 1 tablet by mouth once daily.    nitroGLYCERIN (NITROSTAT) 0.4 MG SL tablet Place 1 tablet (0.4 mg total) under the tongue every 5 (five) minutes as needed for Chest pain (x3 if not better to ER).          While in the hospital, will manage blood pressure as follows; Continue home antihypertensive regimen    Will utilize p.r.n. blood pressure medication only if patient's blood pressure greater than  180/110 and she develops symptoms such as worsening chest pain or shortness of breath.

## 2021-12-30 NOTE — PLAN OF CARE
Poc reviewed with pt. Pt verbalizes understanding. Frequent rounding Q 2 hours. Nausea and pain controlled with prn meds, call light within reach. Will continue to monitor

## 2021-12-30 NOTE — ASSESSMENT & PLAN NOTE
Chronic problem  -Pulmonology following  -S/P RIGHT pleurx cath placement  -Will need education prior to d/c on use  -Repeat CXR in AM: Small pneumo discussed with surgeon- not unexpected finding post-procedurally.

## 2021-12-30 NOTE — ASSESSMENT & PLAN NOTE
Patient is chronically on statin.will continue for now. Monitor clinically. Last LDL was   Lab Results   Component Value Date    LDLCALC 21.6 (L) 05/25/2021

## 2021-12-30 NOTE — NURSING
Informed Dr. Gil of pt's bp 85/48; hr 70. Nauseated. Turned UF off. Received orders for Albumin 25 gm IV to support BP.

## 2021-12-30 NOTE — PLAN OF CARE
Home health orders sent via [employer]       12/30/21 8259   Post-Acute Status   Post-Acute Authorization Home Health   Home Health Status Referrals Sent

## 2021-12-30 NOTE — ASSESSMENT & PLAN NOTE
Acute on chronic problem  Nephrology consulted.   Continue Chronic hemodialysis.   Monitor daily electrolytes and defer dialysis orders to nephrology.  Plan for HD again tomorrow prior to d/c.

## 2021-12-30 NOTE — SUBJECTIVE & OBJECTIVE
Interval History: Patient seen and examined after pleurx placement.  Reports mild pain at insertion site worse with movement.  No N/V.  SOB improved per patient.  Discussed with surgeon. Plan for repeat CXR tomorrow and d/c to home with home health.  Will dialyze again tomorrow.    Review of Systems   Constitutional: Positive for fatigue. Negative for chills and fever.   HENT: Negative for congestion, postnasal drip and trouble swallowing.    Respiratory: Positive for shortness of breath (improved post pleurx). Negative for cough and wheezing.    Cardiovascular: Positive for chest pain (at pleurx insertion site). Negative for leg swelling.   Gastrointestinal: Negative for abdominal pain, nausea and vomiting.     Objective:     Vital Signs (Most Recent):  Temp: 97.6 °F (36.4 °C) (12/30/21 1425)  Pulse: (!) 55 (12/30/21 1500)  Resp: 17 (12/30/21 1425)  BP: (!) 116/49 (12/30/21 1500)  SpO2: 95 % (12/30/21 1413) Vital Signs (24h Range):  Temp:  [96.6 °F (35.9 °C)-97.9 °F (36.6 °C)] 97.6 °F (36.4 °C)  Pulse:  [55-93] 55  Resp:  [12-22] 17  SpO2:  [87 %-100 %] 95 %  BP: (105-149)/(48-80) 116/49     Weight: 41.3 kg (91 lb)  Body mass index is 17.19 kg/m².    Intake/Output Summary (Last 24 hours) at 12/30/2021 1516  Last data filed at 12/30/2021 1405  Gross per 24 hour   Intake 97.67 ml   Output 1 ml   Net 96.67 ml      Physical Exam  Vitals and nursing note reviewed.   Constitutional:       Appearance: Normal appearance. She is ill-appearing (chronically ill appearing).   HENT:      Head: Normocephalic and atraumatic.      Mouth/Throat:      Mouth: Mucous membranes are moist.      Pharynx: Oropharynx is clear.   Eyes:      Extraocular Movements: Extraocular movements intact.      Conjunctiva/sclera: Conjunctivae normal.      Pupils: Pupils are equal, round, and reactive to light.   Cardiovascular:      Rate and Rhythm: Normal rate and regular rhythm.      Pulses: Normal pulses.   Pulmonary:      Effort: Pulmonary effort is  normal. No respiratory distress.      Comments: RIGHT SQ emphysema palpable  Pleurx dressing CDI  Abdominal:      General: Abdomen is flat. Bowel sounds are normal. There is no distension.      Palpations: Abdomen is soft.      Tenderness: There is no abdominal tenderness.   Skin:     General: Skin is warm and dry.      Capillary Refill: Capillary refill takes less than 2 seconds.   Neurological:      General: No focal deficit present.      Mental Status: She is alert.   Psychiatric:         Mood and Affect: Mood normal.         Significant Labs:   All pertinent labs within the past 24 hours have been reviewed.  CBC:   Recent Labs   Lab 12/29/21 0210 12/30/21  0813   WBC 7.80 6.34   HGB 9.4* 9.2*   HCT 32.5* 31.1*    240     CMP:   Recent Labs   Lab 12/29/21 0210 12/30/21  0813    143   K 3.8 4.0   * 107   CO2 13* 23   GLU 81 86   BUN 56* 29*   CREATININE 4.6* 3.6*   CALCIUM 10.0 8.3*   PROT 7.3  --    ALBUMIN 3.1* 2.6*   BILITOT 0.3  --    ALKPHOS 117  --    AST 11  --    ALT 7*  --    ANIONGAP 15 13   EGFRNONAA 9* 12*       Significant Imaging: I have reviewed all pertinent imaging results/findings within the past 24 hours.

## 2021-12-30 NOTE — PLAN OF CARE
Notified pt's dialysis unit of pt not discharging today. Pt next scheduled dialysis is Monday Jan 3rd.

## 2021-12-30 NOTE — PT/OT/SLP PROGRESS
Physical Therapy      Patient Name:  Radha Jurado   MRN:  [id number]    PT eval attempted- pt to surgery.

## 2021-12-31 NOTE — ASSESSMENT & PLAN NOTE
Acute on chronic problem  Nephrology consulted.   Continue Chronic hemodialysis.   Monitor daily electrolytes and defer dialysis orders to nephrology.  Will plan for f/u with her routine HD unit on 1/3.

## 2021-12-31 NOTE — SUBJECTIVE & OBJECTIVE
Interval History: Patient seen and examined on HD this AM.  Reports some residual soreness at pleurx insertion site.  SOB much improved- weaned off O2 this AM.  Pleurx with 800 ml ss drainage noted-- connected to -20 cm wall suction. Discussed with patient that I would discuss plan with pulmonology and possibly evaluate for discharge this weekend.  Still needs PT.  Pleurx care and compliance likely to be issue.    Review of Systems   Constitutional: Positive for fatigue. Negative for chills and fever.   HENT: Negative for congestion, postnasal drip and trouble swallowing.    Respiratory: Positive for shortness of breath (improved post pleurx). Negative for cough and wheezing.    Cardiovascular: Positive for chest pain (at pleurx insertion site). Negative for leg swelling.   Gastrointestinal: Negative for abdominal pain, nausea and vomiting.     Objective:     Vital Signs (Most Recent):  Temp: 97.4 °F (36.3 °C) (12/31/21 0835)  Pulse: 72 (12/31/21 1030)  Resp: 16 (12/31/21 0835)  BP: (!) 99/38 (12/31/21 1030)  SpO2: 100 % (12/31/21 0835) Vital Signs (24h Range):  Temp:  [97.2 °F (36.2 °C)-98.6 °F (37 °C)] 97.4 °F (36.3 °C)  Pulse:  [55-88] 72  Resp:  [10-22] 16  SpO2:  [88 %-100 %] 100 %  BP: ()/(38-68) 99/38     Weight: 41.3 kg (91 lb)  Body mass index is 17.19 kg/m².    Intake/Output Summary (Last 24 hours) at 12/31/2021 1117  Last data filed at 12/31/2021 0800  Gross per 24 hour   Intake 337.67 ml   Output 1 ml   Net 336.67 ml      Physical Exam  Vitals and nursing note reviewed.   Constitutional:       Appearance: Normal appearance. She is ill-appearing (chronically ill appearing).   HENT:      Head: Normocephalic and atraumatic.      Mouth/Throat:      Mouth: Mucous membranes are moist.      Pharynx: Oropharynx is clear.   Eyes:      Extraocular Movements: Extraocular movements intact.      Conjunctiva/sclera: Conjunctivae normal.      Pupils: Pupils are equal, round, and reactive to light.    Cardiovascular:      Rate and Rhythm: Normal rate and regular rhythm.      Pulses: Normal pulses.   Pulmonary:      Effort: Pulmonary effort is normal. No respiratory distress.      Comments: Pleurx dressing CDI  Approx. 800 cc light serosanguineous drainage in canister.  Abdominal:      General: Abdomen is flat. Bowel sounds are normal. There is no distension.      Palpations: Abdomen is soft.      Tenderness: There is no abdominal tenderness.   Skin:     General: Skin is warm and dry.      Capillary Refill: Capillary refill takes less than 2 seconds.   Neurological:      General: No focal deficit present.      Mental Status: She is alert.   Psychiatric:         Mood and Affect: Mood normal.         Significant Labs:   All pertinent labs within the past 24 hours have been reviewed.  CBC:   Recent Labs   Lab 12/30/21  0813   WBC 6.34   HGB 9.2*   HCT 31.1*        CMP:   Recent Labs   Lab 12/30/21  0813      K 4.0      CO2 23   GLU 86   BUN 29*   CREATININE 3.6*   CALCIUM 8.3*   ALBUMIN 2.6*   ANIONGAP 13   EGFRNONAA 12*       Significant Imaging: I have reviewed all pertinent imaging results/findings within the past 24 hours.

## 2021-12-31 NOTE — ASSESSMENT & PLAN NOTE
Chronic Problem  Chronic, controlled.  Latest blood pressure and vitals reviewed-   Temp:  [97.2 °F (36.2 °C)-98.6 °F (37 °C)]   Pulse:  [55-88]   Resp:  [10-22]   BP: ()/(38-68)   SpO2:  [88 %-100 %] .   Home meds for hypertension were reviewed and noted below.   Hypertension Medications             amLODIPine (NORVASC) 10 MG tablet Take 10 mg by mouth once daily.    hydrALAZINE (APRESOLINE) 25 MG tablet Take 1 tablet by mouth 2 (two) times daily.    metoprolol succinate (TOPROL-XL) 50 MG 24 hr tablet Take 1 tablet by mouth once daily.    nitroGLYCERIN (NITROSTAT) 0.4 MG SL tablet Place 1 tablet (0.4 mg total) under the tongue every 5 (five) minutes as needed for Chest pain (x3 if not better to ER).          While in the hospital, will manage blood pressure as follows; Continue home antihypertensive regimen    Will utilize p.r.n. blood pressure medication only if patient's blood pressure greater than  180/110 and she develops symptoms such as worsening chest pain or shortness of breath.

## 2021-12-31 NOTE — PROGRESS NOTES
Progress Note  PULMONARY    Admit Date: 12/29/2021 12/31/2021      SUBJECTIVE:     12/30- for pleurX today, no new complaints  12/31 the patient's PleurX has been at suction because there was a tiny pneumothorax after placement of the PleurX.  There is no leak.  The drain has drained approximately 800 cc so far.  It is time to cap it and then the patient can be discharged at the primary's convenience.      OBJECTIVE:     Vitals (Most recent):  Vitals:    12/31/21 1246   BP:    Pulse:    Resp: 16   Temp:        Vitals (24 hour range):  Temp:  [97.2 °F (36.2 °C)-98.6 °F (37 °C)]   Pulse:  [55-87]   Resp:  [10-18]   BP: ()/(38-68)   SpO2:  [88 %-100 %]       Intake/Output Summary (Last 24 hours) at 12/31/2021 1413  Last data filed at 12/31/2021 1200  Gross per 24 hour   Intake 240 ml   Output --   Net 240 ml          Physical Exam:  The patient's neuro status (alertness,orientation,cognitive function,motor skills,), pharyngeal exam (oral lesions, hygiene, abn dentition,), Neck (jvd,mass,thyroid,nodes in neck and above/below clavicle),RESPIRATORY(symmetry,effort,fremitus,percussion,auscultation),  Cor(rhythm,heart tones including gallops,perfusion,edema)ABD(distention,hepatic&splenomegaly,tenderness,masses), Skin(rash,cyanosis),Psyc(affect,judgement,).  Exam negative except for these pertinent findings:    Awake, alert, pale and chronically ill appearing  Crackles on the right, clear on left  R pleurX with significant tenderness at the chest wall, no leak is seen  abd soft nontender  LUE fistula- getting hd  No edema    Radiographs reviewed: view by direct vision   CXR 12/30- post pleurX with pneumothorax, subq air  Chest x-ray 12/31 there is a tiny apical pneumothorax still visible.  There are increased interstitial markings diffusely.  There is right lower lobe atelectasis and a small left effusion.    Labs     No results for input(s): WBC, HGB, HCT, PLT, BAND, METAMYELOCYT, MYELOPCT, HGBA1C in the last 24  hours.  No results for input(s): NA, K, CL, CO2, BUN, CREATININE, GLU, CALCIUM, CAION, MG, PHOS, AST, ALT, ALKPHOS, BILITOT, BILIDIR, PROT, ALBUMIN, PREALBUMIN, AMYLASE, LIPASE, CRP, HSCRP, SEDRATE, PROCAL, INR, PTT, LABHEPA, LACTATE, TROPONINI, CPK, CPKMB, MB, BNP in the last 24 hours.No results for input(s): PH, PCO2, PO2, HCO3 in the last 24 hours.  Microbiology Results (last 7 days)     ** No results found for the last 168 hours. **          Impression:  Active Hospital Problems    Diagnosis  POA    *ESRD (end stage renal disease) on dialysis [N18.6, Z99.2]  Not Applicable    Pleural effusion [J90]  Yes    Moderate malnutrition [E44.0]  Yes    COPD (chronic obstructive pulmonary disease) [J44.9]  Yes    Tobacco abuse [Z72.0]  Yes    Hyperlipidemia [E78.5]  Yes    Hypertension due to end stage renal disease on dialysis [I12.0, N18.6, Z99.2]  Not Applicable      Resolved Hospital Problems   No resolved problems to display.               Plan:     - disconnect PleurX from suction and dress appropriately  - toradol for pain  - okay for discharge from a pulmonary point of view, patient has oxygen at home  - ok to restart plavix and aspirin   - dialysis per nephro  Patient will benefit from home health to handle drainage of her PleurX catheter  Discussed with nurse practitioner

## 2021-12-31 NOTE — PROGRESS NOTES
[facility] Hospital Medicine  Progress Note    Patient Name: Radha Jurado  MRN: [id number]  Patient Class: IP- Inpatient   Admission Date: 12/29/2021  Length of Stay: 2 days  Attending Physician: Emelia Delgadillo MD  Primary Care Provider: Jeovany Carpio MD        Subjective:     Principal Problem:ESRD (end stage renal disease) on dialysis        HPI:  Radha Jurado is a 71-year-old female who presents to the emergency room this morning complaining of shortness of breath.  She reports shortness of breath has been present for the past approximately 1 week and has progressively worsened.  She denies any fever or chills.  No known sick contacts or travel.  She is vaccinated for COVID.  She reports she has missed her last 5 hemodialysis sessions due to not feeling well and also social issues regarding transportation.  Previous medical history includes chronic diastolic heart failure, coronary artery disease, dialysis, hep C, hyperlipidemia, and active smoker.  ER workup:  CBC with baseline anemia.  CMP with BUN of 56, creatinine of 4.6, bicarb of 13. BNP 3832. Troponin mildly elevated 0.04.  Chest x-ray demonstrates cardiomegaly a with large right pleural effusion.  She is scheduled to follow-up with pulmonology for possible bronchoscopy.  Patient admitted to Hospital Medicine for observation management.  Will consult Nephrology for dialysis today.      Overview/Hospital Course:  No notes on file    Interval History: Patient seen and examined on HD this AM.  Reports some residual soreness at pleurx insertion site.  SOB much improved- weaned off O2 this AM.  Pleurx with 800 ml ss drainage noted-- connected to -20 cm wall suction. Discussed with patient that I would discuss plan with pulmonology and possibly evaluate for discharge this weekend.  Still needs PT.  Pleurx care and compliance likely to be issue.    Review of Systems   Constitutional: Positive for fatigue. Negative for chills and fever.    HENT: Negative for congestion, postnasal drip and trouble swallowing.    Respiratory: Positive for shortness of breath (improved post pleurx). Negative for cough and wheezing.    Cardiovascular: Positive for chest pain (at pleurx insertion site). Negative for leg swelling.   Gastrointestinal: Negative for abdominal pain, nausea and vomiting.     Objective:     Vital Signs (Most Recent):  Temp: 97.4 °F (36.3 °C) (12/31/21 0835)  Pulse: 72 (12/31/21 1030)  Resp: 16 (12/31/21 0835)  BP: (!) 99/38 (12/31/21 1030)  SpO2: 100 % (12/31/21 0835) Vital Signs (24h Range):  Temp:  [97.2 °F (36.2 °C)-98.6 °F (37 °C)] 97.4 °F (36.3 °C)  Pulse:  [55-88] 72  Resp:  [10-22] 16  SpO2:  [88 %-100 %] 100 %  BP: ()/(38-68) 99/38     Weight: 41.3 kg (91 lb)  Body mass index is 17.19 kg/m².    Intake/Output Summary (Last 24 hours) at 12/31/2021 1117  Last data filed at 12/31/2021 0800  Gross per 24 hour   Intake 337.67 ml   Output 1 ml   Net 336.67 ml      Physical Exam  Vitals and nursing note reviewed.   Constitutional:       Appearance: Normal appearance. She is ill-appearing (chronically ill appearing).   HENT:      Head: Normocephalic and atraumatic.      Mouth/Throat:      Mouth: Mucous membranes are moist.      Pharynx: Oropharynx is clear.   Eyes:      Extraocular Movements: Extraocular movements intact.      Conjunctiva/sclera: Conjunctivae normal.      Pupils: Pupils are equal, round, and reactive to light.   Cardiovascular:      Rate and Rhythm: Normal rate and regular rhythm.      Pulses: Normal pulses.   Pulmonary:      Effort: Pulmonary effort is normal. No respiratory distress.      Comments: Pleurx dressing CDI  Approx. 800 cc light serosanguineous drainage in canister.  Abdominal:      General: Abdomen is flat. Bowel sounds are normal. There is no distension.      Palpations: Abdomen is soft.      Tenderness: There is no abdominal tenderness.   Skin:     General: Skin is warm and dry.      Capillary Refill:  Capillary refill takes less than 2 seconds.   Neurological:      General: No focal deficit present.      Mental Status: She is alert.   Psychiatric:         Mood and Affect: Mood normal.         Significant Labs:   All pertinent labs within the past 24 hours have been reviewed.  CBC:   Recent Labs   Lab 12/30/21  0813   WBC 6.34   HGB 9.2*   HCT 31.1*        CMP:   Recent Labs   Lab 12/30/21  0813      K 4.0      CO2 23   GLU 86   BUN 29*   CREATININE 3.6*   CALCIUM 8.3*   ALBUMIN 2.6*   ANIONGAP 13   EGFRNONAA 12*       Significant Imaging: I have reviewed all pertinent imaging results/findings within the past 24 hours.      Assessment/Plan:      * ESRD (end stage renal disease) on dialysis  Acute on chronic problem  Nephrology consulted.   Continue Chronic hemodialysis.   Monitor daily electrolytes and defer dialysis orders to nephrology.  Will plan for f/u with her routine HD unit on 1/3.      Pleural effusion  Chronic problem  -Pulmonology following  -S/P RIGHT pleurx cath placement  -Will need education prior to d/c on use  -Placed to -20 cm suction yesterday.  800 cc out. CXR improved thereafter          Moderate malnutrition  Chronic Problem  Nutrition consulted. Body mass index is 17.19 kg/m².. Encourage maximal PO intake. Diet supplementation ordered per nutrition approval. Will encourage PO and monitor closely for weight changes.      COPD (chronic obstructive pulmonary disease)  Chronic Problem  Patient's COPD is controlled currently.  Patient is currently off COPD Pathway. Continue scheduled inhalers Supplemental oxygen and monitor respiratory status closely.       Hypertension due to end stage renal disease on dialysis  Chronic Problem  Chronic, controlled.  Latest blood pressure and vitals reviewed-   Temp:  [97.2 °F (36.2 °C)-98.6 °F (37 °C)]   Pulse:  [55-88]   Resp:  [10-22]   BP: ()/(38-68)   SpO2:  [88 %-100 %] .   Home meds for hypertension were reviewed and noted below.    Hypertension Medications             amLODIPine (NORVASC) 10 MG tablet Take 10 mg by mouth once daily.    hydrALAZINE (APRESOLINE) 25 MG tablet Take 1 tablet by mouth 2 (two) times daily.    metoprolol succinate (TOPROL-XL) 50 MG 24 hr tablet Take 1 tablet by mouth once daily.    nitroGLYCERIN (NITROSTAT) 0.4 MG SL tablet Place 1 tablet (0.4 mg total) under the tongue every 5 (five) minutes as needed for Chest pain (x3 if not better to ER).          While in the hospital, will manage blood pressure as follows; Continue home antihypertensive regimen    Will utilize p.r.n. blood pressure medication only if patient's blood pressure greater than  180/110 and she develops symptoms such as worsening chest pain or shortness of breath.      Hyperlipidemia   Patient is chronically on statin.will continue for now. Monitor clinically. Last LDL was   Lab Results   Component Value Date    LDLCALC 21.6 (L) 05/25/2021            Tobacco abuse  Chronic Problem  Dangers of cigarette smoking were reviewed with patient in detail for 3 minutes and patient was encouraged to quit. Nicotine replacement options were discussed. She declines      VTE Risk Mitigation (From admission, onward)         Ordered     Place CARMEN hose  Until discontinued         12/29/21 0526     IP VTE HIGH RISK PATIENT  Once         12/29/21 0526     Place sequential compression device  Until discontinued         12/29/21 0526                Discharge Planning   JUAN: 1/1/2022      Code Status: Full Code   Is the patient medically ready for discharge?:     Reason for patient still in hospital (select all that apply): Patient trending condition and Consult recommendations  Discharge Plan A: Home with family                  April Spain NP  Department of Hospital Medicine   [facility]

## 2021-12-31 NOTE — PROGRESS NOTES
pleurex yesterday  ptx on cxr  No cxr this am    No sob    Feels good, getting dialysis    Pod 1 pleurex  Management as per pulm  Will sign off

## 2021-12-31 NOTE — PT/OT/SLP PROGRESS
Physical Therapy      Patient Name:  Radha Jurado   MRN:  [id number]    Patient not seen today secondary to Dialysis. Will follow-up on 01/01/22.

## 2021-12-31 NOTE — PROGRESS NOTES
Progress Note  PULMONARY    Admit Date: 12/29/2021 12/30/2021      SUBJECTIVE:     12/30- for pleurX today, no new complaints      OBJECTIVE:     Vitals (Most recent):  Vitals:    12/30/21 1904   BP:    Pulse: 87   Resp:    Temp:        Vitals (24 hour range):  Temp:  [96.6 °F (35.9 °C)-97.9 °F (36.6 °C)]   Pulse:  [55-88]   Resp:  [12-22]   BP: ()/(41-80)   SpO2:  [88 %-100 %]       Intake/Output Summary (Last 24 hours) at 12/30/2021 2017  Last data filed at 12/30/2021 1405  Gross per 24 hour   Intake 97.67 ml   Output 1 ml   Net 96.67 ml          Physical Exam:  The patient's neuro status (alertness,orientation,cognitive function,motor skills,), pharyngeal exam (oral lesions, hygiene, abn dentition,), Neck (jvd,mass,thyroid,nodes in neck and above/below clavicle),RESPIRATORY(symmetry,effort,fremitus,percussion,auscultation),  Cor(rhythm,heart tones including gallops,perfusion,edema)ABD(distention,hepatic&splenomegaly,tenderness,masses), Skin(rash,cyanosis),Psyc(affect,judgement,).  Exam negative except for these pertinent findings:    Awake, alert, pale and chronically ill appearing  Diminished breath sounds on right, clear on left  R pleurX with significant tenderness and crepitus of chest wall- placed to suction with light pink clear drainage and minimal air leak  abd soft nontender  LUE fistula- getting hd  No edema    Radiographs reviewed: view by direct vision   CXR 12/30- post pleurX with pneumothorax, subq air    Labs     Recent Labs   Lab 12/30/21  0813   WBC 6.34   HGB 9.2*   HCT 31.1*        Recent Labs   Lab 12/30/21  0813 12/30/21  0813 12/30/21  1005     --   --    K 4.0  --   --      --   --    CO2 23  --   --    BUN 29*  --   --    CREATININE 3.6*  --   --    GLU 86  --   --    CALCIUM 8.3*  --   --    PHOS 4.0   < > 4.0   ALBUMIN 2.6*  --   --     < > = values in this interval not displayed.   No results for input(s): PH, PCO2, PO2, HCO3 in the last 24  hours.  Microbiology Results (last 7 days)     ** No results found for the last 168 hours. **          Impression:  Active Hospital Problems    Diagnosis  POA    *ESRD (end stage renal disease) on dialysis [N18.6, Z99.2]  Not Applicable    Pleural effusion [J90]  Yes    Moderate malnutrition [E44.0]  Yes    COPD (chronic obstructive pulmonary disease) [J44.9]  Yes    Tobacco abuse [Z72.0]  Yes    Hyperlipidemia [E78.5]  Yes    Hypertension due to end stage renal disease on dialysis [I12.0, N18.6, Z99.2]  Not Applicable      Resolved Hospital Problems   No resolved problems to display.               Plan:     - pleurx placed today per surgery  - toradol for pain  - placed pleurX to -20cm suction for post procedural pneumothorax  - repeat cxr is improved  - ok to restart plavix and aspirin in am  - dialysis per nephlizeth Ahumada MD  Pulmonary & Critical Care Medicine

## 2021-12-31 NOTE — ASSESSMENT & PLAN NOTE
Chronic problem  -Pulmonology following  -S/P RIGHT pleurx cath placement  -Will need education prior to d/c on use  -Placed to -20 cm suction yesterday.  800 cc out. CXR improved thereafter

## 2022-01-01 ENCOUNTER — OFFICE VISIT (OUTPATIENT)
Dept: PRIMARY CARE CLINIC | Facility: CLINIC | Age: 72
End: 2022-01-01
Payer: MEDICARE

## 2022-01-01 ENCOUNTER — TELEPHONE (OUTPATIENT)
Dept: PULMONOLOGY | Facility: CLINIC | Age: 72
End: 2022-01-01
Payer: MEDICARE

## 2022-01-01 ENCOUNTER — HOSPITAL ENCOUNTER (INPATIENT)
Facility: HOSPITAL | Age: 72
LOS: 1 days | DRG: 070 | End: 2022-03-16
Attending: EMERGENCY MEDICINE | Admitting: INTERNAL MEDICINE
Payer: MEDICARE

## 2022-01-01 ENCOUNTER — NURSE TRIAGE (OUTPATIENT)
Dept: ADMINISTRATIVE | Facility: CLINIC | Age: 72
End: 2022-01-01
Payer: MEDICARE

## 2022-01-01 ENCOUNTER — IMMUNIZATION (OUTPATIENT)
Dept: PRIMARY CARE CLINIC | Facility: CLINIC | Age: 72
End: 2022-01-01
Payer: MEDICARE

## 2022-01-01 ENCOUNTER — TELEPHONE (OUTPATIENT)
Dept: MEDSURG UNIT | Facility: HOSPITAL | Age: 72
End: 2022-01-01
Payer: MEDICARE

## 2022-01-01 ENCOUNTER — TELEPHONE (OUTPATIENT)
Dept: PRIMARY CARE CLINIC | Facility: CLINIC | Age: 72
End: 2022-01-01
Payer: MEDICARE

## 2022-01-01 ENCOUNTER — PATIENT OUTREACH (OUTPATIENT)
Dept: ADMINISTRATIVE | Facility: CLINIC | Age: 72
End: 2022-01-01
Payer: MEDICARE

## 2022-01-01 ENCOUNTER — OFFICE VISIT (OUTPATIENT)
Dept: PULMONOLOGY | Facility: CLINIC | Age: 72
End: 2022-01-01
Payer: MEDICARE

## 2022-01-01 VITALS
BODY MASS INDEX: 17.18 KG/M2 | SYSTOLIC BLOOD PRESSURE: 114 MMHG | WEIGHT: 91 LBS | HEART RATE: 83 BPM | DIASTOLIC BLOOD PRESSURE: 55 MMHG | TEMPERATURE: 97 F | RESPIRATION RATE: 16 BRPM | OXYGEN SATURATION: 97 % | HEIGHT: 61 IN

## 2022-01-01 VITALS
RESPIRATION RATE: 17 BRPM | HEART RATE: 76 BPM | TEMPERATURE: 97 F | SYSTOLIC BLOOD PRESSURE: 145 MMHG | DIASTOLIC BLOOD PRESSURE: 92 MMHG | BODY MASS INDEX: 18.73 KG/M2 | WEIGHT: 99.19 LBS | HEIGHT: 61 IN

## 2022-01-01 VITALS
WEIGHT: 103 LBS | BODY MASS INDEX: 19.45 KG/M2 | HEIGHT: 61 IN | SYSTOLIC BLOOD PRESSURE: 112 MMHG | DIASTOLIC BLOOD PRESSURE: 58 MMHG | RESPIRATION RATE: 18 BRPM | HEART RATE: 75 BPM

## 2022-01-01 VITALS
OXYGEN SATURATION: 96 % | HEART RATE: 87 BPM | BODY MASS INDEX: 19.46 KG/M2 | SYSTOLIC BLOOD PRESSURE: 136 MMHG | HEIGHT: 61 IN | RESPIRATION RATE: 16 BRPM | DIASTOLIC BLOOD PRESSURE: 59 MMHG

## 2022-01-01 VITALS
WEIGHT: 87.63 LBS | HEIGHT: 61 IN | RESPIRATION RATE: 18 BRPM | DIASTOLIC BLOOD PRESSURE: 44 MMHG | OXYGEN SATURATION: 98 % | HEART RATE: 54 BPM | BODY MASS INDEX: 16.55 KG/M2 | SYSTOLIC BLOOD PRESSURE: 112 MMHG

## 2022-01-01 DIAGNOSIS — N18.6 ESRD ON DIALYSIS: ICD-10-CM

## 2022-01-01 DIAGNOSIS — I50.33: ICD-10-CM

## 2022-01-01 DIAGNOSIS — Z23 NEED FOR VACCINATION: Primary | ICD-10-CM

## 2022-01-01 DIAGNOSIS — R06.02 SOB (SHORTNESS OF BREATH): ICD-10-CM

## 2022-01-01 DIAGNOSIS — Z23 NEED FOR VACCINATION FOR ZOSTER: Primary | ICD-10-CM

## 2022-01-01 DIAGNOSIS — D64.9 ANEMIA, UNSPECIFIED TYPE: ICD-10-CM

## 2022-01-01 DIAGNOSIS — S16.1XXA STRAIN OF NECK MUSCLE, INITIAL ENCOUNTER: Primary | ICD-10-CM

## 2022-01-01 DIAGNOSIS — A41.9 SEPSIS, DUE TO UNSPECIFIED ORGANISM, UNSPECIFIED WHETHER ACUTE ORGAN DYSFUNCTION PRESENT: ICD-10-CM

## 2022-01-01 DIAGNOSIS — N18.6 END-STAGE RENAL DISEASE NEEDING DIALYSIS: Primary | ICD-10-CM

## 2022-01-01 DIAGNOSIS — Z99.2 ESRD ON DIALYSIS: ICD-10-CM

## 2022-01-01 DIAGNOSIS — E44.0 MODERATE MALNUTRITION: ICD-10-CM

## 2022-01-01 DIAGNOSIS — R41.82 ALTERED MENTAL STATUS, UNSPECIFIED ALTERED MENTAL STATUS TYPE: ICD-10-CM

## 2022-01-01 DIAGNOSIS — B18.2 CHRONIC HEPATITIS C WITHOUT HEPATIC COMA: ICD-10-CM

## 2022-01-01 DIAGNOSIS — R51.9 HEADACHE, UNSPECIFIED HEADACHE TYPE: ICD-10-CM

## 2022-01-01 DIAGNOSIS — J44.9 CHRONIC OBSTRUCTIVE PULMONARY DISEASE, UNSPECIFIED COPD TYPE: ICD-10-CM

## 2022-01-01 DIAGNOSIS — R79.89 ELEVATED TROPONIN: ICD-10-CM

## 2022-01-01 DIAGNOSIS — J90 BILATERAL PLEURAL EFFUSION: ICD-10-CM

## 2022-01-01 DIAGNOSIS — R60.9 EDEMA: ICD-10-CM

## 2022-01-01 DIAGNOSIS — T07.XXXA WOUNDS, MULTIPLE: ICD-10-CM

## 2022-01-01 DIAGNOSIS — Z99.2 END-STAGE RENAL DISEASE NEEDING DIALYSIS: Primary | ICD-10-CM

## 2022-01-01 DIAGNOSIS — I25.10 CORONARY ARTERY DISEASE INVOLVING NATIVE CORONARY ARTERY OF NATIVE HEART WITHOUT ANGINA PECTORIS: ICD-10-CM

## 2022-01-01 DIAGNOSIS — J44.9 CHRONIC OBSTRUCTIVE PULMONARY DISEASE, UNSPECIFIED COPD TYPE: Primary | ICD-10-CM

## 2022-01-01 DIAGNOSIS — J90 PLEURAL EFFUSION: ICD-10-CM

## 2022-01-01 DIAGNOSIS — Z72.0 TOBACCO ABUSE: ICD-10-CM

## 2022-01-01 DIAGNOSIS — J90 PLEURAL EFFUSION, BILATERAL: ICD-10-CM

## 2022-01-01 DIAGNOSIS — E16.2 HYPOGLYCEMIA: ICD-10-CM

## 2022-01-01 DIAGNOSIS — R06.02 SHORTNESS OF BREATH: ICD-10-CM

## 2022-01-01 PROBLEM — N30.00 ACUTE CYSTITIS WITHOUT HEMATURIA: Status: ACTIVE | Noted: 2022-01-01

## 2022-01-01 LAB
ALBUMIN SERPL BCP-MCNC: 2.1 G/DL (ref 3.5–5.2)
ALBUMIN SERPL BCP-MCNC: 2.6 G/DL (ref 3.5–5.2)
ALBUMIN SERPL BCP-MCNC: 2.8 G/DL (ref 3.5–5.2)
ALLENS TEST: ABNORMAL
ALP SERPL-CCNC: 130 U/L (ref 55–135)
ALT SERPL W/O P-5'-P-CCNC: 23 U/L (ref 10–44)
AMMONIA PLAS-SCNC: 22 UMOL/L (ref 10–50)
AMORPH CRY URNS QL MICRO: ABNORMAL
ANION GAP SERPL CALC-SCNC: 13 MMOL/L (ref 8–16)
ANION GAP SERPL CALC-SCNC: 14 MMOL/L (ref 8–16)
ANION GAP SERPL CALC-SCNC: 24 MMOL/L (ref 8–16)
AST SERPL-CCNC: 215 U/L (ref 10–40)
BACTERIA #/AREA URNS HPF: ABNORMAL /HPF
BACTERIA UR CULT: NO GROWTH
BASOPHILS # BLD AUTO: 0.01 K/UL (ref 0–0.2)
BASOPHILS # BLD AUTO: 0.02 K/UL (ref 0–0.2)
BASOPHILS # BLD AUTO: 0.04 K/UL (ref 0–0.2)
BASOPHILS NFR BLD: 0.1 % (ref 0–1.9)
BASOPHILS NFR BLD: 0.2 % (ref 0–1.9)
BASOPHILS NFR BLD: 0.3 % (ref 0–1.9)
BILIRUB SERPL-MCNC: 1.2 MG/DL (ref 0.1–1)
BILIRUB UR QL STRIP: ABNORMAL
BNP SERPL-MCNC: 4049 PG/ML (ref 0–99)
BUN SERPL-MCNC: 19 MG/DL (ref 8–23)
BUN SERPL-MCNC: 37 MG/DL (ref 8–23)
BUN SERPL-MCNC: 8 MG/DL (ref 8–23)
CALCIUM SERPL-MCNC: 7.9 MG/DL (ref 8.7–10.5)
CALCIUM SERPL-MCNC: 8.4 MG/DL (ref 8.7–10.5)
CALCIUM SERPL-MCNC: 8.5 MG/DL (ref 8.7–10.5)
CHLORIDE SERPL-SCNC: 103 MMOL/L (ref 95–110)
CHLORIDE SERPL-SCNC: 103 MMOL/L (ref 95–110)
CHLORIDE SERPL-SCNC: 89 MMOL/L (ref 95–110)
CLARITY UR: CLEAR
CO2 SERPL-SCNC: 19 MMOL/L (ref 23–29)
CO2 SERPL-SCNC: 21 MMOL/L (ref 23–29)
CO2 SERPL-SCNC: 21 MMOL/L (ref 23–29)
COLOR UR: YELLOW
CREAT SERPL-MCNC: 2.3 MG/DL (ref 0.5–1.4)
CREAT SERPL-MCNC: 4 MG/DL (ref 0.5–1.4)
CREAT SERPL-MCNC: 6.3 MG/DL (ref 0.5–1.4)
DELSYS: ABNORMAL
DIFFERENTIAL METHOD: ABNORMAL
EOSINOPHIL # BLD AUTO: 0 K/UL (ref 0–0.5)
EOSINOPHIL # BLD AUTO: 0.4 K/UL (ref 0–0.5)
EOSINOPHIL # BLD AUTO: 0.5 K/UL (ref 0–0.5)
EOSINOPHIL NFR BLD: 0.1 % (ref 0–8)
EOSINOPHIL NFR BLD: 2.5 % (ref 0–8)
EOSINOPHIL NFR BLD: 4 % (ref 0–8)
ERYTHROCYTE [DISTWIDTH] IN BLOOD BY AUTOMATED COUNT: 16.3 % (ref 11.5–14.5)
ERYTHROCYTE [DISTWIDTH] IN BLOOD BY AUTOMATED COUNT: 16.5 % (ref 11.5–14.5)
ERYTHROCYTE [DISTWIDTH] IN BLOOD BY AUTOMATED COUNT: 20.1 % (ref 11.5–14.5)
EST. GFR  (AFRICAN AMERICAN): 12 ML/MIN/1.73 M^2
EST. GFR  (AFRICAN AMERICAN): 24 ML/MIN/1.73 M^2
EST. GFR  (AFRICAN AMERICAN): 7 ML/MIN/1.73 M^2
EST. GFR  (NON AFRICAN AMERICAN): 11 ML/MIN/1.73 M^2
EST. GFR  (NON AFRICAN AMERICAN): 21 ML/MIN/1.73 M^2
EST. GFR  (NON AFRICAN AMERICAN): 6.1 ML/MIN/1.73 M^2
FINAL PATHOLOGIC DIAGNOSIS: NORMAL
FIO2: 28
FLOW: 2
GLUCOSE SERPL-MCNC: 100 MG/DL (ref 70–110)
GLUCOSE SERPL-MCNC: 100 MG/DL (ref 70–110)
GLUCOSE SERPL-MCNC: 164 MG/DL (ref 70–110)
GLUCOSE SERPL-MCNC: 206 MG/DL (ref 70–110)
GLUCOSE SERPL-MCNC: 259 MG/DL (ref 70–110)
GLUCOSE SERPL-MCNC: 94 MG/DL (ref 70–110)
GLUCOSE UR QL STRIP: NEGATIVE
HCO3 UR-SCNC: 18.5 MMOL/L (ref 24–28)
HCT VFR BLD AUTO: 28.3 % (ref 37–48.5)
HCT VFR BLD AUTO: 30.4 % (ref 37–48.5)
HCT VFR BLD AUTO: 39.5 % (ref 37–48.5)
HGB BLD-MCNC: 12.7 G/DL (ref 12–16)
HGB BLD-MCNC: 8.4 G/DL (ref 12–16)
HGB BLD-MCNC: 9 G/DL (ref 12–16)
HGB UR QL STRIP: NEGATIVE
HYALINE CASTS #/AREA URNS LPF: 0 /LPF
IMM GRANULOCYTES # BLD AUTO: 0.07 K/UL (ref 0–0.04)
IMM GRANULOCYTES # BLD AUTO: 0.09 K/UL (ref 0–0.04)
IMM GRANULOCYTES # BLD AUTO: 0.15 K/UL (ref 0–0.04)
IMM GRANULOCYTES NFR BLD AUTO: 0.6 % (ref 0–0.5)
IMM GRANULOCYTES NFR BLD AUTO: 0.6 % (ref 0–0.5)
IMM GRANULOCYTES NFR BLD AUTO: 1.2 % (ref 0–0.5)
KETONES UR QL STRIP: ABNORMAL
LACTATE SERPL-SCNC: 3.3 MMOL/L (ref 0.5–1.9)
LACTATE SERPL-SCNC: 5.7 MMOL/L (ref 0.5–1.9)
LEUKOCYTE ESTERASE UR QL STRIP: NEGATIVE
LYMPHOCYTES # BLD AUTO: 0.2 K/UL (ref 1–4.8)
LYMPHOCYTES # BLD AUTO: 0.5 K/UL (ref 1–4.8)
LYMPHOCYTES # BLD AUTO: 0.7 K/UL (ref 1–4.8)
LYMPHOCYTES NFR BLD: 1.2 % (ref 18–48)
LYMPHOCYTES NFR BLD: 3.2 % (ref 18–48)
LYMPHOCYTES NFR BLD: 5.4 % (ref 18–48)
Lab: NORMAL
MAGNESIUM SERPL-MCNC: 2.1 MG/DL (ref 1.6–2.6)
MCH RBC QN AUTO: 29.2 PG (ref 27–31)
MCH RBC QN AUTO: 29.2 PG (ref 27–31)
MCH RBC QN AUTO: 29.7 PG (ref 27–31)
MCHC RBC AUTO-ENTMCNC: 29.6 G/DL (ref 32–36)
MCHC RBC AUTO-ENTMCNC: 29.7 G/DL (ref 32–36)
MCHC RBC AUTO-ENTMCNC: 32.2 G/DL (ref 32–36)
MCV RBC AUTO: 93 FL (ref 82–98)
MCV RBC AUTO: 98 FL (ref 82–98)
MCV RBC AUTO: 99 FL (ref 82–98)
MICROSCOPIC COMMENT: ABNORMAL
MODE: ABNORMAL
MONOCYTES # BLD AUTO: 0.9 K/UL (ref 0.3–1)
MONOCYTES # BLD AUTO: 0.9 K/UL (ref 0.3–1)
MONOCYTES # BLD AUTO: 1 K/UL (ref 0.3–1)
MONOCYTES NFR BLD: 6.5 % (ref 4–15)
MONOCYTES NFR BLD: 6.8 % (ref 4–15)
MONOCYTES NFR BLD: 7.1 % (ref 4–15)
NEUTROPHILS # BLD AUTO: 11.4 K/UL (ref 1.8–7.7)
NEUTROPHILS # BLD AUTO: 13.2 K/UL (ref 1.8–7.7)
NEUTROPHILS # BLD AUTO: 9.9 K/UL (ref 1.8–7.7)
NEUTROPHILS NFR BLD: 82.7 % (ref 38–73)
NEUTROPHILS NFR BLD: 86.9 % (ref 38–73)
NEUTROPHILS NFR BLD: 90.6 % (ref 38–73)
NITRITE UR QL STRIP: POSITIVE
NRBC BLD-RTO: 0 /100 WBC
NRBC BLD-RTO: 0 /100 WBC
NRBC BLD-RTO: 1 /100 WBC
PCO2 BLDA: 30.6 MMHG (ref 35–45)
PH SMN: 7.39 [PH] (ref 7.35–7.45)
PH UR STRIP: 5 [PH] (ref 5–8)
PHOSPHATE SERPL-MCNC: 2.7 MG/DL (ref 2.7–4.5)
PHOSPHATE SERPL-MCNC: 4.2 MG/DL (ref 2.7–4.5)
PLATELET # BLD AUTO: 102 K/UL (ref 150–450)
PLATELET # BLD AUTO: 181 K/UL (ref 150–450)
PLATELET # BLD AUTO: 202 K/UL (ref 150–450)
PMV BLD AUTO: 11.3 FL (ref 9.2–12.9)
PMV BLD AUTO: 11.5 FL (ref 9.2–12.9)
PMV BLD AUTO: 12.9 FL (ref 9.2–12.9)
PO2 BLDA: 64 MMHG (ref 80–100)
POC BE: -6 MMOL/L
POC SATURATED O2: 92 % (ref 95–100)
POC TCO2: 19 MMOL/L (ref 23–27)
POTASSIUM SERPL-SCNC: 3.3 MMOL/L (ref 3.5–5.1)
POTASSIUM SERPL-SCNC: 3.5 MMOL/L (ref 3.5–5.1)
POTASSIUM SERPL-SCNC: 4.4 MMOL/L (ref 3.5–5.1)
PROT SERPL-MCNC: 5.5 G/DL (ref 6–8.4)
PROT UR QL STRIP: ABNORMAL
RBC # BLD AUTO: 2.88 M/UL (ref 4–5.4)
RBC # BLD AUTO: 3.08 M/UL (ref 4–5.4)
RBC # BLD AUTO: 4.27 M/UL (ref 4–5.4)
RBC #/AREA URNS HPF: 1 /HPF (ref 0–4)
SAMPLE: ABNORMAL
SARS-COV-2 RDRP RESP QL NAA+PROBE: NEGATIVE
SITE: ABNORMAL
SODIUM SERPL-SCNC: 132 MMOL/L (ref 136–145)
SODIUM SERPL-SCNC: 137 MMOL/L (ref 136–145)
SODIUM SERPL-SCNC: 138 MMOL/L (ref 136–145)
SP GR UR STRIP: >=1.03 (ref 1–1.03)
SQUAMOUS #/AREA URNS HPF: 8 /HPF
T4 FREE SERPL-MCNC: 0.34 NG/DL (ref 0.71–1.51)
TROPONIN I SERPL DL<=0.01 NG/ML-MCNC: 0.14 NG/ML
TROPONIN I SERPL DL<=0.01 NG/ML-MCNC: 0.18 NG/ML
TSH SERPL DL<=0.005 MIU/L-ACNC: 11.49 UIU/ML (ref 0.34–5.6)
URN SPEC COLLECT METH UR: ABNORMAL
UROBILINOGEN UR STRIP-ACNC: NEGATIVE EU/DL
WBC # BLD AUTO: 11.94 K/UL (ref 3.9–12.7)
WBC # BLD AUTO: 12.6 K/UL (ref 3.9–12.7)
WBC # BLD AUTO: 15.17 K/UL (ref 3.9–12.7)
WBC #/AREA URNS HPF: 6 /HPF (ref 0–5)

## 2022-01-01 PROCEDURE — 80069 RENAL FUNCTION PANEL: CPT | Performed by: NURSE PRACTITIONER

## 2022-01-01 PROCEDURE — 99999 PR PBB SHADOW E&M-EST. PATIENT-LVL V: CPT | Mod: PBBFAC,,, | Performed by: INTERNAL MEDICINE

## 2022-01-01 PROCEDURE — 1101F PT FALLS ASSESS-DOCD LE1/YR: CPT | Mod: CPTII,S$GLB,, | Performed by: INTERNAL MEDICINE

## 2022-01-01 PROCEDURE — 3078F PR MOST RECENT DIASTOLIC BLOOD PRESSURE < 80 MM HG: ICD-10-PCS | Mod: CPTII,S$GLB,, | Performed by: INTERNAL MEDICINE

## 2022-01-01 PROCEDURE — 99214 OFFICE O/P EST MOD 30 MIN: CPT | Mod: S$GLB,,, | Performed by: INTERNAL MEDICINE

## 2022-01-01 PROCEDURE — 36415 COLL VENOUS BLD VENIPUNCTURE: CPT | Performed by: INTERNAL MEDICINE

## 2022-01-01 PROCEDURE — 87340 HEPATITIS B SURFACE AG IA: CPT | Performed by: EMERGENCY MEDICINE

## 2022-01-01 PROCEDURE — 3075F PR MOST RECENT SYSTOLIC BLOOD PRESS GE 130-139MM HG: ICD-10-PCS | Mod: CPTII,S$GLB,, | Performed by: INTERNAL MEDICINE

## 2022-01-01 PROCEDURE — 25000003 PHARM REV CODE 250: Performed by: NURSE PRACTITIONER

## 2022-01-01 PROCEDURE — 82962 GLUCOSE BLOOD TEST: CPT

## 2022-01-01 PROCEDURE — 3008F PR BODY MASS INDEX (BMI) DOCUMENTED: ICD-10-PCS | Mod: CPTII,S$GLB,, | Performed by: INTERNAL MEDICINE

## 2022-01-01 PROCEDURE — 99999 PR PBB SHADOW E&M-EST. PATIENT-LVL IV: CPT | Mod: PBBFAC,,, | Performed by: INTERNAL MEDICINE

## 2022-01-01 PROCEDURE — 1101F PR PT FALLS ASSESS DOC 0-1 FALLS W/OUT INJ PAST YR: ICD-10-PCS | Mod: CPTII,S$GLB,, | Performed by: INTERNAL MEDICINE

## 2022-01-01 PROCEDURE — 92950 HEART/LUNG RESUSCITATION CPR: CPT

## 2022-01-01 PROCEDURE — 1126F PR PAIN SEVERITY QUANTIFIED, NO PAIN PRESENT: ICD-10-PCS | Mod: CPTII,S$GLB,, | Performed by: INTERNAL MEDICINE

## 2022-01-01 PROCEDURE — 12000002 HC ACUTE/MED SURGE SEMI-PRIVATE ROOM

## 2022-01-01 PROCEDURE — 3078F DIAST BP <80 MM HG: CPT | Mod: CPTII,S$GLB,, | Performed by: INTERNAL MEDICINE

## 2022-01-01 PROCEDURE — 83605 ASSAY OF LACTIC ACID: CPT | Performed by: EMERGENCY MEDICINE

## 2022-01-01 PROCEDURE — 94640 AIRWAY INHALATION TREATMENT: CPT

## 2022-01-01 PROCEDURE — 99231 PR SUBSEQUENT HOSPITAL CARE,LEVL I: ICD-10-PCS | Mod: S$GLB,,, | Performed by: INTERNAL MEDICINE

## 2022-01-01 PROCEDURE — U0002 COVID-19 LAB TEST NON-CDC: HCPCS | Performed by: EMERGENCY MEDICINE

## 2022-01-01 PROCEDURE — 99231 SBSQ HOSP IP/OBS SF/LOW 25: CPT | Mod: S$GLB,,, | Performed by: INTERNAL MEDICINE

## 2022-01-01 PROCEDURE — 1159F PR MEDICATION LIST DOCUMENTED IN MEDICAL RECORD: ICD-10-PCS | Mod: CPTII,S$GLB,, | Performed by: INTERNAL MEDICINE

## 2022-01-01 PROCEDURE — 63600175 PHARM REV CODE 636 W HCPCS: Performed by: EMERGENCY MEDICINE

## 2022-01-01 PROCEDURE — 1125F PR PAIN SEVERITY QUANTIFIED, PAIN PRESENT: ICD-10-PCS | Mod: CPTII,S$GLB,, | Performed by: INTERNAL MEDICINE

## 2022-01-01 PROCEDURE — 3008F BODY MASS INDEX DOCD: CPT | Mod: CPTII,S$GLB,, | Performed by: INTERNAL MEDICINE

## 2022-01-01 PROCEDURE — 85025 COMPLETE CBC W/AUTO DIFF WBC: CPT | Performed by: EMERGENCY MEDICINE

## 2022-01-01 PROCEDURE — 96366 THER/PROPH/DIAG IV INF ADDON: CPT

## 2022-01-01 PROCEDURE — 36600 WITHDRAWAL OF ARTERIAL BLOOD: CPT

## 2022-01-01 PROCEDURE — 36415 COLL VENOUS BLD VENIPUNCTURE: CPT | Performed by: EMERGENCY MEDICINE

## 2022-01-01 PROCEDURE — 63600175 PHARM REV CODE 636 W HCPCS: Performed by: INTERNAL MEDICINE

## 2022-01-01 PROCEDURE — 99291 CRITICAL CARE FIRST HOUR: CPT

## 2022-01-01 PROCEDURE — 81000 URINALYSIS NONAUTO W/SCOPE: CPT | Performed by: NURSE PRACTITIONER

## 2022-01-01 PROCEDURE — 27000221 HC OXYGEN, UP TO 24 HOURS

## 2022-01-01 PROCEDURE — G0179 PR HOME HEALTH MD RECERTIFICATION: ICD-10-PCS | Mod: ,,, | Performed by: INTERNAL MEDICINE

## 2022-01-01 PROCEDURE — 96365 THER/PROPH/DIAG IV INF INIT: CPT

## 2022-01-01 PROCEDURE — 84439 ASSAY OF FREE THYROXINE: CPT | Performed by: EMERGENCY MEDICINE

## 2022-01-01 PROCEDURE — 84484 ASSAY OF TROPONIN QUANT: CPT | Performed by: EMERGENCY MEDICINE

## 2022-01-01 PROCEDURE — 82803 BLOOD GASES ANY COMBINATION: CPT

## 2022-01-01 PROCEDURE — 93005 ELECTROCARDIOGRAM TRACING: CPT | Performed by: INTERNAL MEDICINE

## 2022-01-01 PROCEDURE — 0004A COVID-19, MRNA, LNP-S, PF, 30 MCG/0.3 ML DOSE VACCINE: CPT | Mod: PBBFAC | Performed by: FAMILY MEDICINE

## 2022-01-01 PROCEDURE — 99214 PR OFFICE/OUTPT VISIT, EST, LEVL IV, 30-39 MIN: ICD-10-PCS | Mod: S$GLB,,, | Performed by: INTERNAL MEDICINE

## 2022-01-01 PROCEDURE — 97530 THERAPEUTIC ACTIVITIES: CPT

## 2022-01-01 PROCEDURE — 85025 COMPLETE CBC W/AUTO DIFF WBC: CPT | Performed by: NURSE PRACTITIONER

## 2022-01-01 PROCEDURE — 99999 PR PBB SHADOW E&M-EST. PATIENT-LVL IV: ICD-10-PCS | Mod: PBBFAC,,, | Performed by: INTERNAL MEDICINE

## 2022-01-01 PROCEDURE — 80053 COMPREHEN METABOLIC PANEL: CPT | Performed by: EMERGENCY MEDICINE

## 2022-01-01 PROCEDURE — 31500 INSERT EMERGENCY AIRWAY: CPT

## 2022-01-01 PROCEDURE — 3288F PR FALLS RISK ASSESSMENT DOCUMENTED: ICD-10-PCS | Mod: CPTII,S$GLB,, | Performed by: INTERNAL MEDICINE

## 2022-01-01 PROCEDURE — 3074F SYST BP LT 130 MM HG: CPT | Mod: CPTII,S$GLB,, | Performed by: INTERNAL MEDICINE

## 2022-01-01 PROCEDURE — 1159F MED LIST DOCD IN RCRD: CPT | Mod: CPTII,S$GLB,, | Performed by: INTERNAL MEDICINE

## 2022-01-01 PROCEDURE — 11000001 HC ACUTE MED/SURG PRIVATE ROOM

## 2022-01-01 PROCEDURE — 97161 PT EVAL LOW COMPLEX 20 MIN: CPT

## 2022-01-01 PROCEDURE — G0179 MD RECERTIFICATION HHA PT: HCPCS | Mod: ,,, | Performed by: INTERNAL MEDICINE

## 2022-01-01 PROCEDURE — 93010 EKG 12-LEAD: ICD-10-PCS | Mod: ,,, | Performed by: INTERNAL MEDICINE

## 2022-01-01 PROCEDURE — 90935 HEMODIALYSIS ONE EVALUATION: CPT

## 2022-01-01 PROCEDURE — 99900035 HC TECH TIME PER 15 MIN (STAT)

## 2022-01-01 PROCEDURE — 1125F AMNT PAIN NOTED PAIN PRSNT: CPT | Mod: CPTII,S$GLB,, | Performed by: INTERNAL MEDICINE

## 2022-01-01 PROCEDURE — 25000003 PHARM REV CODE 250: Performed by: INTERNAL MEDICINE

## 2022-01-01 PROCEDURE — 94760 N-INVAS EAR/PLS OXIMETRY 1: CPT

## 2022-01-01 PROCEDURE — 1111F PR DISCHARGE MEDS RECONCILED W/ CURRENT OUTPATIENT MED LIST: ICD-10-PCS | Mod: CPTII,S$GLB,, | Performed by: INTERNAL MEDICINE

## 2022-01-01 PROCEDURE — 93010 ELECTROCARDIOGRAM REPORT: CPT | Mod: ,,, | Performed by: INTERNAL MEDICINE

## 2022-01-01 PROCEDURE — 83735 ASSAY OF MAGNESIUM: CPT | Performed by: EMERGENCY MEDICINE

## 2022-01-01 PROCEDURE — 94761 N-INVAS EAR/PLS OXIMETRY MLT: CPT

## 2022-01-01 PROCEDURE — 1126F AMNT PAIN NOTED NONE PRSNT: CPT | Mod: CPTII,S$GLB,, | Performed by: INTERNAL MEDICINE

## 2022-01-01 PROCEDURE — 99999 PR PBB SHADOW E&M-EST. PATIENT-LVL V: ICD-10-PCS | Mod: PBBFAC,,, | Performed by: INTERNAL MEDICINE

## 2022-01-01 PROCEDURE — 99900031 HC PATIENT EDUCATION (STAT)

## 2022-01-01 PROCEDURE — 3074F PR MOST RECENT SYSTOLIC BLOOD PRESSURE < 130 MM HG: ICD-10-PCS | Mod: CPTII,S$GLB,, | Performed by: INTERNAL MEDICINE

## 2022-01-01 PROCEDURE — 87086 URINE CULTURE/COLONY COUNT: CPT | Performed by: NURSE PRACTITIONER

## 2022-01-01 PROCEDURE — 84484 ASSAY OF TROPONIN QUANT: CPT | Mod: 91 | Performed by: INTERNAL MEDICINE

## 2022-01-01 PROCEDURE — 3288F FALL RISK ASSESSMENT DOCD: CPT | Mod: CPTII,S$GLB,, | Performed by: INTERNAL MEDICINE

## 2022-01-01 PROCEDURE — 87040 BLOOD CULTURE FOR BACTERIA: CPT | Mod: 59 | Performed by: EMERGENCY MEDICINE

## 2022-01-01 PROCEDURE — 36415 COLL VENOUS BLD VENIPUNCTURE: CPT | Performed by: NURSE PRACTITIONER

## 2022-01-01 PROCEDURE — 63600175 PHARM REV CODE 636 W HCPCS: Performed by: NURSE PRACTITIONER

## 2022-01-01 PROCEDURE — 94799 UNLISTED PULMONARY SVC/PX: CPT

## 2022-01-01 PROCEDURE — 1160F PR REVIEW ALL MEDS BY PRESCRIBER/CLIN PHARMACIST DOCUMENTED: ICD-10-PCS | Mod: CPTII,S$GLB,, | Performed by: INTERNAL MEDICINE

## 2022-01-01 PROCEDURE — 3075F SYST BP GE 130 - 139MM HG: CPT | Mod: CPTII,S$GLB,, | Performed by: INTERNAL MEDICINE

## 2022-01-01 PROCEDURE — 1100F PTFALLS ASSESS-DOCD GE2>/YR: CPT | Mod: CPTII,S$GLB,, | Performed by: INTERNAL MEDICINE

## 2022-01-01 PROCEDURE — 83605 ASSAY OF LACTIC ACID: CPT | Mod: 91 | Performed by: INTERNAL MEDICINE

## 2022-01-01 PROCEDURE — 1100F PR PT FALLS ASSESS DOC 2+ FALLS/FALL W/INJURY/YR: ICD-10-PCS | Mod: CPTII,S$GLB,, | Performed by: INTERNAL MEDICINE

## 2022-01-01 PROCEDURE — 83880 ASSAY OF NATRIURETIC PEPTIDE: CPT | Performed by: EMERGENCY MEDICINE

## 2022-01-01 PROCEDURE — 84443 ASSAY THYROID STIM HORMONE: CPT | Performed by: EMERGENCY MEDICINE

## 2022-01-01 PROCEDURE — 1111F DSCHRG MED/CURRENT MED MERGE: CPT | Mod: CPTII,S$GLB,, | Performed by: INTERNAL MEDICINE

## 2022-01-01 PROCEDURE — 25000242 PHARM REV CODE 250 ALT 637 W/ HCPCS: Performed by: INTERNAL MEDICINE

## 2022-01-01 PROCEDURE — 1160F RVW MEDS BY RX/DR IN RCRD: CPT | Mod: CPTII,S$GLB,, | Performed by: INTERNAL MEDICINE

## 2022-01-01 PROCEDURE — 82140 ASSAY OF AMMONIA: CPT | Performed by: INTERNAL MEDICINE

## 2022-01-01 RX ORDER — SODIUM CHLORIDE 0.9 % (FLUSH) 0.9 %
2 SYRINGE (ML) INJECTION EVERY 6 HOURS PRN
Status: DISCONTINUED | OUTPATIENT
Start: 2022-01-01 | End: 2022-01-01 | Stop reason: HOSPADM

## 2022-01-01 RX ORDER — ALBUTEROL SULFATE 90 UG/1
2 AEROSOL, METERED RESPIRATORY (INHALATION) EVERY 8 HOURS
Status: DISCONTINUED | OUTPATIENT
Start: 2022-01-01 | End: 2022-01-01

## 2022-01-01 RX ORDER — PANTOPRAZOLE SODIUM 40 MG/1
40 TABLET, DELAYED RELEASE ORAL 2 TIMES DAILY
COMMUNITY

## 2022-01-01 RX ORDER — CLOPIDOGREL BISULFATE 75 MG/1
75 TABLET ORAL DAILY
Status: DISCONTINUED | OUTPATIENT
Start: 2022-01-01 | End: 2022-01-01 | Stop reason: HOSPADM

## 2022-01-01 RX ORDER — CALCIUM CARBONATE 200(500)MG
1500 TABLET,CHEWABLE ORAL DAILY
Status: DISCONTINUED | OUTPATIENT
Start: 2022-01-01 | End: 2022-01-01 | Stop reason: HOSPADM

## 2022-01-01 RX ORDER — CEPHALEXIN 250 MG/1
250 CAPSULE ORAL EVERY 12 HOURS
Qty: 10 CAPSULE | Refills: 0 | Status: SHIPPED | OUTPATIENT
Start: 2022-01-01 | End: 2022-01-01

## 2022-01-01 RX ORDER — NAPROXEN SODIUM 220 MG/1
81 TABLET, FILM COATED ORAL DAILY
Status: DISCONTINUED | OUTPATIENT
Start: 2022-01-01 | End: 2022-01-01 | Stop reason: HOSPADM

## 2022-01-01 RX ORDER — PANTOPRAZOLE SODIUM 40 MG/10ML
80 INJECTION, POWDER, LYOPHILIZED, FOR SOLUTION INTRAVENOUS DAILY
Status: DISCONTINUED | OUTPATIENT
Start: 2022-01-01 | End: 2022-01-01 | Stop reason: HOSPADM

## 2022-01-01 RX ORDER — LORAZEPAM 0.5 MG/1
1 TABLET ORAL EVERY 30 MIN PRN
Status: DISCONTINUED | OUTPATIENT
Start: 2022-01-01 | End: 2022-01-01 | Stop reason: HOSPADM

## 2022-01-01 RX ORDER — LEVOTHYROXINE SODIUM 25 UG/1
25 TABLET ORAL
Status: DISCONTINUED | OUTPATIENT
Start: 2022-01-01 | End: 2022-01-01 | Stop reason: HOSPADM

## 2022-01-01 RX ORDER — MECLIZINE HCL 12.5 MG 12.5 MG/1
12.5 TABLET ORAL 3 TIMES DAILY PRN
Status: DISCONTINUED | OUTPATIENT
Start: 2022-01-01 | End: 2022-01-01 | Stop reason: HOSPADM

## 2022-01-01 RX ORDER — CHOLECALCIFEROL (VITAMIN D3) 50 MCG
4000 TABLET ORAL DAILY
Status: DISCONTINUED | OUTPATIENT
Start: 2022-01-01 | End: 2022-01-01 | Stop reason: HOSPADM

## 2022-01-01 RX ORDER — METHYLPREDNISOLONE SOD SUCC 125 MG
125 VIAL (EA) INJECTION ONCE
Status: DISCONTINUED | OUTPATIENT
Start: 2022-01-01 | End: 2022-01-01 | Stop reason: HOSPADM

## 2022-01-01 RX ORDER — BUTALBITAL, ACETAMINOPHEN AND CAFFEINE 50; 325; 40 MG/1; MG/1; MG/1
1 TABLET ORAL EVERY 6 HOURS PRN
Qty: 30 TABLET | Refills: 0 | Status: SHIPPED | OUTPATIENT
Start: 2022-01-01 | End: 2022-01-01

## 2022-01-01 RX ORDER — ATORVASTATIN CALCIUM 20 MG/1
80 TABLET, FILM COATED ORAL NIGHTLY
Status: DISCONTINUED | OUTPATIENT
Start: 2022-01-01 | End: 2022-01-01 | Stop reason: HOSPADM

## 2022-01-01 RX ORDER — AMIODARONE HYDROCHLORIDE 100 MG/1
100 TABLET ORAL 2 TIMES DAILY
Status: DISCONTINUED | OUTPATIENT
Start: 2022-01-01 | End: 2022-01-01 | Stop reason: HOSPADM

## 2022-01-01 RX ORDER — BUTALBITAL, ACETAMINOPHEN AND CAFFEINE 50; 325; 40 MG/1; MG/1; MG/1
TABLET ORAL
Qty: 30 TABLET | Refills: 2 | Status: SHIPPED | OUTPATIENT
Start: 2022-01-01

## 2022-01-01 RX ORDER — FERRIC CITRATE 210 MG/1
1 TABLET, COATED ORAL DAILY
COMMUNITY
Start: 2021-01-01

## 2022-01-01 RX ORDER — SODIUM BICARBONATE 650 MG/1
1300 TABLET ORAL 2 TIMES DAILY
Status: DISCONTINUED | OUTPATIENT
Start: 2022-01-01 | End: 2022-01-01 | Stop reason: HOSPADM

## 2022-01-01 RX ORDER — ACETAMINOPHEN 500 MG
5000 TABLET ORAL DAILY
COMMUNITY

## 2022-01-01 RX ORDER — SODIUM CHLORIDE 9 MG/ML
INJECTION, SOLUTION INTRAVENOUS ONCE
Status: DISCONTINUED | OUTPATIENT
Start: 2022-01-01 | End: 2022-01-01 | Stop reason: HOSPADM

## 2022-01-01 RX ORDER — NITROGLYCERIN 0.4 MG/1
0.4 TABLET SUBLINGUAL EVERY 5 MIN PRN
Status: DISCONTINUED | OUTPATIENT
Start: 2022-01-01 | End: 2022-01-01 | Stop reason: HOSPADM

## 2022-01-01 RX ORDER — PANTOPRAZOLE SODIUM 40 MG/1
40 TABLET, DELAYED RELEASE ORAL DAILY
Status: DISCONTINUED | OUTPATIENT
Start: 2022-01-01 | End: 2022-01-01

## 2022-01-01 RX ORDER — ZOSTER VACCINE RECOMBINANT, ADJUVANTED 50 MCG/0.5
0.5 KIT INTRAMUSCULAR ONCE
Qty: 1 EACH | Refills: 0 | Status: SHIPPED | OUTPATIENT
Start: 2022-01-01 | End: 2022-01-01

## 2022-01-01 RX ORDER — PAROXETINE HYDROCHLORIDE 20 MG/1
20 TABLET, FILM COATED ORAL EVERY MORNING
Qty: 90 TABLET | Refills: 3 | Status: SHIPPED | OUTPATIENT
Start: 2022-01-01 | End: 2022-11-01

## 2022-01-01 RX ORDER — MUPIROCIN 20 MG/G
OINTMENT TOPICAL 2 TIMES DAILY
Status: DISCONTINUED | OUTPATIENT
Start: 2022-01-01 | End: 2022-01-01 | Stop reason: HOSPADM

## 2022-01-01 RX ORDER — ASPIRIN 325 MG
325 TABLET, DELAYED RELEASE (ENTERIC COATED) ORAL DAILY
Status: DISCONTINUED | OUTPATIENT
Start: 2022-01-01 | End: 2022-01-01 | Stop reason: HOSPADM

## 2022-01-01 RX ORDER — HEPARIN SODIUM 5000 [USP'U]/ML
5000 INJECTION, SOLUTION INTRAVENOUS; SUBCUTANEOUS EVERY 8 HOURS
Status: DISCONTINUED | OUTPATIENT
Start: 2022-01-01 | End: 2022-01-01

## 2022-01-01 RX ORDER — IPRATROPIUM BROMIDE AND ALBUTEROL SULFATE 2.5; .5 MG/3ML; MG/3ML
3 SOLUTION RESPIRATORY (INHALATION) EVERY 6 HOURS
Status: DISCONTINUED | OUTPATIENT
Start: 2022-01-01 | End: 2022-01-01

## 2022-01-01 RX ORDER — METOPROLOL SUCCINATE 50 MG/1
50 TABLET, EXTENDED RELEASE ORAL DAILY
Status: DISCONTINUED | OUTPATIENT
Start: 2022-01-01 | End: 2022-01-01 | Stop reason: HOSPADM

## 2022-01-01 RX ORDER — FLUTICASONE FUROATE AND VILANTEROL 200; 25 UG/1; UG/1
1 POWDER RESPIRATORY (INHALATION) DAILY
Status: DISCONTINUED | OUTPATIENT
Start: 2022-01-01 | End: 2022-01-01

## 2022-01-01 RX ORDER — AMLODIPINE BESYLATE 5 MG/1
10 TABLET ORAL DAILY
Status: DISCONTINUED | OUTPATIENT
Start: 2022-01-01 | End: 2022-01-01 | Stop reason: HOSPADM

## 2022-01-01 RX ORDER — HYDRALAZINE HYDROCHLORIDE 25 MG/1
25 TABLET, FILM COATED ORAL EVERY 12 HOURS
Status: DISCONTINUED | OUTPATIENT
Start: 2022-01-01 | End: 2022-01-01 | Stop reason: HOSPADM

## 2022-01-01 RX ORDER — AMLODIPINE BESYLATE 10 MG/1
10 TABLET ORAL DAILY
COMMUNITY

## 2022-01-01 RX ORDER — MORPHINE SULFATE 4 MG/ML
4 INJECTION, SOLUTION INTRAMUSCULAR; INTRAVENOUS
Status: DISCONTINUED | OUTPATIENT
Start: 2022-01-01 | End: 2022-01-01 | Stop reason: HOSPADM

## 2022-01-01 RX ORDER — PAROXETINE HYDROCHLORIDE 20 MG/1
20 TABLET, FILM COATED ORAL DAILY
Status: DISCONTINUED | OUTPATIENT
Start: 2022-01-01 | End: 2022-01-01 | Stop reason: HOSPADM

## 2022-01-01 RX ORDER — ADENOSINE 3 MG/ML
6 INJECTION, SOLUTION INTRAVENOUS ONCE
Status: DISCONTINUED | OUTPATIENT
Start: 2022-01-01 | End: 2022-01-01

## 2022-01-01 RX ORDER — MEROPENEM AND SODIUM CHLORIDE 1 G/50ML
1 INJECTION, SOLUTION INTRAVENOUS
Status: COMPLETED | OUTPATIENT
Start: 2022-01-01 | End: 2022-01-01

## 2022-01-01 RX ADMIN — MEROPENEM AND SODIUM CHLORIDE 1 G: 1 INJECTION, SOLUTION INTRAVENOUS at 10:03

## 2022-01-01 RX ADMIN — DEXTROSE MONOHYDRATE 25 G: 25 INJECTION, SOLUTION INTRAVENOUS at 11:03

## 2022-01-01 RX ADMIN — TRAMADOL HYDROCHLORIDE 50 MG: 50 TABLET ORAL at 09:01

## 2022-01-01 RX ADMIN — PANTOPRAZOLE SODIUM 40 MG: 40 TABLET, DELAYED RELEASE ORAL at 06:03

## 2022-01-01 RX ADMIN — CEFTRIAXONE 1 G: 1 INJECTION, SOLUTION INTRAVENOUS at 12:01

## 2022-01-01 RX ADMIN — CLOPIDOGREL BISULFATE 75 MG: 75 TABLET, FILM COATED ORAL at 09:01

## 2022-01-01 RX ADMIN — MORPHINE SULFATE 4 MG: 4 INJECTION, SOLUTION INTRAMUSCULAR; INTRAVENOUS at 11:03

## 2022-01-01 RX ADMIN — ATORVASTATIN CALCIUM 20 MG: 20 TABLET, FILM COATED ORAL at 09:01

## 2022-01-01 RX ADMIN — IPRATROPIUM BROMIDE AND ALBUTEROL SULFATE 3 ML: .5; 3 SOLUTION RESPIRATORY (INHALATION) at 08:03

## 2022-01-01 RX ADMIN — SODIUM CHLORIDE 250 ML: 0.9 INJECTION, SOLUTION INTRAVENOUS at 02:01

## 2022-01-01 RX ADMIN — MUPIROCIN: 20 OINTMENT TOPICAL at 09:01

## 2022-01-01 RX ADMIN — ASPIRIN 81 MG: 81 TABLET, COATED ORAL at 09:01

## 2022-01-01 RX ADMIN — METOPROLOL SUCCINATE 50 MG: 50 TABLET, EXTENDED RELEASE ORAL at 09:01

## 2022-01-01 RX ADMIN — CYPROHEPTADINE HYDROCHLORIDE 4 MG: 4 TABLET ORAL at 09:01

## 2022-01-01 RX ADMIN — SODIUM BICARBONATE 650 MG TABLET 1300 MG: at 09:03

## 2022-01-01 RX ADMIN — HYDRALAZINE HYDROCHLORIDE 25 MG: 25 TABLET, FILM COATED ORAL at 09:01

## 2022-01-01 RX ADMIN — PAROXETINE 20 MG: 20 TABLET, FILM COATED ORAL at 06:03

## 2022-01-01 RX ADMIN — AMIODARONE HYDROCHLORIDE 100 MG: 100 TABLET ORAL at 09:03

## 2022-01-01 RX ADMIN — APIXABAN 2.5 MG: 2.5 TABLET, FILM COATED ORAL at 09:03

## 2022-01-01 RX ADMIN — AMLODIPINE BESYLATE 10 MG: 5 TABLET ORAL at 09:01

## 2022-01-01 RX ADMIN — MELATONIN TAB 3 MG 9 MG: 3 TAB at 09:01

## 2022-01-01 NOTE — ASSESSMENT & PLAN NOTE
Chronic problem  -Pulmonology following  -S/P RIGHT pleurx cath placement  -Will need education prior to d/c on use  -Placed to -20 cm suction yesterday.  800 cc out. CXR improved thereafter  -Clamped yesterday.  -Need accepting  for discharge.

## 2022-01-01 NOTE — PLAN OF CARE
SW received call from [employer] with [facility] who stated they accept patient.       01/01/22 1437   Post-Acute Status   Post-Acute Authorization Home Health   Home Health Status Set-up Complete/Auth obtained

## 2022-01-01 NOTE — CARE UPDATE
12/31/21 2020   Patient Assessment/Suction   Level of Consciousness (AVPU) alert   Respiratory Effort Normal;Unlabored   Expansion/Accessory Muscles/Retractions expansion symmetric;no retractions;no use of accessory muscles   PRE-TX-O2   O2 Device (Oxygen Therapy) nasal cannula   Flow (L/min) 2   Oxygen Concentration (%) 28   SpO2 97 %   Pulse Oximetry Type Intermittent   Aerosol Therapy   $ Aerosol Therapy Charges PRN treatment not required   Respiratory Treatment Status (SVN) PRN treatment not required   Ready to Wean/Extubation Screen   FIO2<=50 (chart decimal) 0.28

## 2022-01-01 NOTE — PROGRESS NOTES
"[facility]  Nephrology  Consult Note    Patient Name: Radha Jurado  MRN: [id number]  Admission Date: 12/29/2021  Hospital Length of Stay: 2 days  Attending Provider: Emelia Delgadillo MD   Primary Care Physician: Jeovany Carpio MD  Principal Problem:ESRD (end stage renal disease) on dialysis      Subjective:     HPI:   Radha Jurado is a 71 year old  female with a past medical history significant for ESRD , hypertension, hyperlipidemia, remote history of MI, coronary artery disease, COPD, chronic diastolic congestive heart failure, PVD,  Hepatitis-C, and osteoarthritis. She presented to the emergency department reporting shortness of breath.  Patient  missed her last of 5 hemodialysis sessions d/t "not feeling well". Denied transportation  (offered assistance is she was having issues).  Laboratory evaluation upon arrival revealed a serum sodium of 140, potassium 3.8, chloride 112, CO2 seen, BUN 56, creatinine 4.6, GFR 9, glucose 81, calcium 10.0, albumin 3.1, BNP 3832, troponin is 0.044, hemoglobin 9.4, hematocrit 32.5, WBC 7.8, platelet count 306.  Nephrology consulted for hemodialysis management while in the inpatient setting.    Interval History:  Seen and examined on iHD today tolerating treatment without issue or complication. Reports improvement in SOB. Remains with crackles R> L and mild wheezing. S/p pleurx cath insertion 12/30 per surgery for large right pleural effusion. Pain reported around catheter insertion side.       Past Medical History:   Diagnosis Date    Carotid artery occlusion     Chronic diastolic CHF (congestive heart failure)     COPD (chronic obstructive pulmonary disease)     Coronary artery disease     Depression     End-stage renal disease on hemodialysis     History of gastric ulcer     Hyperlipidemia     Hypertension     Migraine headache     Myocardial infarction     Osteoarthritis     Peripheral artery disease        Past Surgical History: "   Procedure Laterality Date    APPENDECTOMY  unknown    BLADDER SUSPENSION N/A 2005    with Mesh    CARDIAC CATHETERIZATION  2009    MI w/PCI @ [facility]    CAROTID ENDARTERECTOMY Right 6/1/2021    Procedure: ENDARTERECTOMY-CAROTID;  Surgeon: Joseph Bello MD;  Location: [facility];  Service: Peripheral Vascular;  Laterality: Right;    CATHETERIZATION OF BOTH LEFT AND RIGHT HEART  06/25/2020    Right brachial accessed    CHOLECYSTECTOMY N/A 2017    COLONOSCOPY N/A 10/5/2020    Procedure: COLONOSCOPY;  Surgeon: Merlin Keller MD;  Location: [facility];  Service: Endoscopy;  Laterality: N/A;    CREATION OF AXILLARY-FEMORAL ARTERY BYPASS WITH GRAFT Right 8/20/2019    Procedure: CREATION, BYPASS, ARTERIAL, AXILLARY TO FEMORAL, USING GRAFT;  Surgeon: Joseph Bello MD;  Location: [facility];  Service: Cardiovascular;  Laterality: Right;    ESOPHAGOGASTRODUODENOSCOPY N/A 10/5/2020    Procedure: EGD (ESOPHAGOGASTRODUODENOSCOPY);  Surgeon: Merlin Keller MD;  Location: [facility];  Service: Endoscopy;  Laterality: N/A;    FISTULOGRAM Left 8/8/2019    Procedure: FISTULOGRAM;  Surgeon: Calixto Javed MD;  Location: [facility] CATH LAB;  Service: Nephrology;  Laterality: Left;    FISTULOGRAM Left 5/19/2020    Procedure: FISTULOGRAM;  Surgeon: Janell Delgado MD;  Location: [facility] CATH LAB;  Service: Nephrology;  Laterality: Left;    HYSTERECTOMY      LEFT HEART CATHETERIZATION Right 6/25/2020    Procedure: Left heart cath;  Surgeon: Alex Mejía MD;  Location: [facility] CATH LAB;  Service: Cardiology;  Laterality: Right;  brachial access    LEFT HEART CATHETERIZATION Right 10/26/2020    Procedure: CATHETERIZATION, HEART, LEFT;  Surgeon: Lester De La Fuente MD;  Location: [facility] CATH LAB;  Service: Cardiology;  Laterality: Right;    NEPHRECTOMY Right 1977    OOPHORECTOMY         Review of patient's allergies indicates:   Allergen Reactions    Dulcolax [bisacodyl] Other (See Comments)     Sweating a lot, weakness     Docusate sodium      Other reaction(s): Makes sweat profusely; Weak    Dulcagen      Current Facility-Administered Medications   Medication Frequency    0.9%  NaCl infusion Continuous    acetaminophen tablet 650 mg Q4H PRN    acetaminophen tablet 650 mg Q6H PRN    albuterol-ipratropium 2.5 mg-0.5 mg/3 mL nebulizer solution 3 mL Q4H PRN    aluminum-magnesium hydroxide-simethicone 200-200-20 mg/5 mL suspension 30 mL QID PRN    amLODIPine tablet 10 mg Daily    aspirin EC tablet 81 mg Daily    atorvastatin tablet 20 mg Daily    clopidogreL tablet 75 mg Daily    cyproheptadine 4 mg tablet 4 mg BID    dextrose 50% injection 12.5 g PRN    dextrose 50% injection 25 g PRN    glucagon (human recombinant) injection 1 mg PRN    glucose chewable tablet 16 g PRN    glucose chewable tablet 24 g PRN    hydrALAZINE tablet 25 mg BID    magnesium oxide tablet 800 mg PRN    magnesium oxide tablet 800 mg PRN    melatonin tablet 9 mg Nightly PRN    metoprolol succinate (TOPROL-XL) 24 hr tablet 50 mg Daily    mupirocin 2 % ointment BID    naloxone 0.4 mg/mL injection 0.02 mg PRN    nitroGLYCERIN SL tablet 0.4 mg Q5 Min PRN    ondansetron injection 4 mg Q8H PRN    potassium, sodium phosphates 280-160-250 mg packet 2 packet PRN    potassium, sodium phosphates 280-160-250 mg packet 2 packet PRN    potassium, sodium phosphates 280-160-250 mg packet 2 packet PRN    simethicone chewable tablet 80 mg QID PRN    sodium chloride 0.9% flush 10 mL Q8H PRN    traMADoL tablet 50 mg Q6H PRN     Facility-Administered Medications Ordered in Other Encounters   Medication Frequency    0.9%  NaCl infusion Continuous     Family History     Problem Relation (Age of Onset)    Cancer Mother, Father    Heart attack Father    Heart disease Father    Heart failure Father    Hypertension Father        Tobacco Use    Smoking status: Light Tobacco Smoker     Packs/day: 0.25     Years: 50.00     Pack years: 12.50     Types: Cigarettes      Start date: 1962    Smokeless tobacco: Never Used    Tobacco comment: Do not smoke day of surgery   Substance and Sexual Activity    Alcohol use: Not Currently     Comment: holidays    Drug use: No    Sexual activity: Not Currently     Partners: Male     Review of Systems   Constitutional: Positive for appetite change and fatigue.   Respiratory: Positive for cough and wheezing.    Neurological: Positive for weakness.     Objective:     Vital Signs (Most Recent):  Temp: 96 °F (35.6 °C) (12/31/21 1641)  Pulse: 86 (12/31/21 1641)  Resp: 16 (12/31/21 1641)  BP: (!) 101/57 (12/31/21 1641)  SpO2: 97 % (12/31/21 1641)  O2 Device (Oxygen Therapy): nasal cannula (12/31/21 0854) Vital Signs (24h Range):  Temp:  [96 °F (35.6 °C)-98.6 °F (37 °C)] 96 °F (35.6 °C)  Pulse:  [72-86] 86  Resp:  [10-18] 16  SpO2:  [97 %-100 %] 97 %  BP: ()/(38-63) 101/57     Weight: 41.3 kg (91 lb) (12/30/21 0959)  Body mass index is 17.19 kg/m².  Body surface area is 1.33 meters squared.    I/O last 3 completed shifts:  In: 337.7 [P.O.:300; I.V.:37.7]  Out: 201 [Urine:200; Blood:1]    Physical Exam  Vitals and nursing note reviewed.   Constitutional:       Appearance: She is ill-appearing (Chronically ill appearing).   HENT:      Head: Normocephalic and atraumatic.      Mouth/Throat:      Mouth: Mucous membranes are moist.   Neck:      Vascular: JVD present.   Cardiovascular:      Rate and Rhythm: Normal rate and regular rhythm.      Pulses: Normal pulses.      Heart sounds: Normal heart sounds. No friction rub. No gallop.    Pulmonary:      Effort: Pulmonary effort is normal.      Breath sounds: Wheezing and rales (Right) present.   Abdominal:      General: Abdomen is flat. Bowel sounds are normal. There is no distension.      Palpations: Abdomen is soft.      Tenderness: There is no abdominal tenderness. There is no guarding or rebound.   Musculoskeletal:         General: Normal range of motion.      Cervical back: Normal range of  motion. No rigidity.      Right lower leg: No edema.      Left lower leg: No edema.   Skin:     General: Skin is warm.      Capillary Refill: Capillary refill takes less than 2 seconds.      Findings: No bruising, erythema, lesion or rash.   Neurological:      General: No focal deficit present.      Mental Status: She is alert and oriented to person, place, and time. Mental status is at baseline.      Motor: Weakness present.   Psychiatric:         Mood and Affect: Mood normal.         Behavior: Behavior normal.         Thought Content: Thought content normal.         Judgment: Judgment normal.         Significant Labs:  ABGs: No results for input(s): PH, PCO2, HCO3, POCSATURATED, BE in the last 168 hours.  BMP:   Recent Labs   Lab 12/30/21  0813   GLU 86      K 4.0      CO2 23   BUN 29*   CREATININE 3.6*   CALCIUM 8.3*     Cardiac Markers: No results for input(s): CKMB, TROPONINT, MYOGLOBIN in the last 168 hours.  CBC:   Recent Labs   Lab 12/30/21  0813   WBC 6.34   RBC 3.15*   HGB 9.2*   HCT 31.1*      MCV 99*   MCH 29.2   MCHC 29.6*     CMP:   Recent Labs   Lab 12/29/21 0210 12/29/21 0210 12/30/21  0813   GLU 81   < > 86   CALCIUM 10.0   < > 8.3*   ALBUMIN 3.1*   < > 2.6*   PROT 7.3  --   --       < > 143   K 3.8   < > 4.0   CO2 13*   < > 23   *   < > 107   BUN 56*   < > 29*   CREATININE 4.6*   < > 3.6*   ALKPHOS 117  --   --    ALT 7*  --   --    AST 11  --   --    BILITOT 0.3  --   --     < > = values in this interval not displayed.     Coagulation: No results for input(s): PT, INR, APTT in the last 168 hours.  LFTs:   Recent Labs   Lab 12/29/21 0210 12/29/21 0210 12/30/21  0813   ALT 7*  --   --    AST 11  --   --    ALKPHOS 117  --   --    BILITOT 0.3  --   --    PROT 7.3  --   --    ALBUMIN 3.1*   < > 2.6*    < > = values in this interval not displayed.     Microbiology Results (last 7 days)     ** No results found for the last 168 hours. **        Specimen (24h ago,  onward)            None        PTH:   Recent Labs   Lab 12/30/21  1005   .4*     TSH: No results for input(s): TSH in the last 168 hours.  No results for input(s): COLORU, CLARITYU, SPECGRAV, PHUR, PROTEINUA, GLUCOSEU, BILIRUBINCON, BLOODU, WBCU, RBCU, BACTERIA, MUCUS, NITRITE, LEUKOCYTESUR, UROBILINOGEN, HYALINECASTS in the last 168 hours.  All labs within the past 24 hours have been reviewed.    Significant Imaging:  EXAMINATION:  XR CHEST AP PORTABLE     CLINICAL HISTORY:  CHF;     TECHNIQUE:  Single frontal view of the chest was performed.     COMPARISON:  Chest of December 13, 2021     FINDINGS:  There remains a large right pleural effusion and infiltrate in the right perihilar and a with opacification of the right middle and right lower lobes.  Cardiac size is obscured.  The left lung appears clear.     Impression:     Continued large right pleural effusion with infiltrate and atelectasis in the right middle lobe and right lower lobe.  Right perihilar infiltrate remains        Electronically signed by: Danielito Wong MD  Date:                                            12/29/2021  Time:                                           07:52    Assessment/Plan:     Active Diagnoses:    Diagnosis Date Noted POA    PRINCIPAL PROBLEM:  ESRD (end stage renal disease) on dialysis [N18.6, Z99.2] 03/14/2018 Not Applicable    Pleural effusion [J90] 06/11/2020 Yes    Moderate malnutrition [E44.0] 02/22/2018 Yes    COPD (chronic obstructive pulmonary disease) [J44.9] 02/21/2018 Yes    Tobacco abuse [Z72.0] 11/08/2012 Yes    Hyperlipidemia [E78.5] 11/08/2012 Yes    Hypertension due to end stage renal disease on dialysis [I12.0, N18.6, Z99.2] 11/08/2012 Not Applicable      Problems Resolved During this Admission:     ESRD on HD  Maintain HD schedule  Seen and examined on iHD today tolerating treatment without issue or complication.   Can discharge with f/u at her regular HD clinic from a nephrology standpoint once  cleared by pulmonology/primary     Hypervolemia  Manage with HD/UF  Target UF of 1-3 L UF as tolerated planned today    Right Pleural Effusion  Large right pleural effusion with infiltrate and atelectasis in the right middle lobe and right lower lobe  S/p pleurx drain 12/30    Hypertension  Improved   Maintained on amlodipine , hydralazine, and Toprol XL  Continue to challenge UF and monitor.     Macrocytic Anemia  Hgb below goal at 9.2  Retacrit 50 unit/kg IV x 1 dose 12/30  B12 and folate levels wnl    Renal MBD  cCa 9.4, , Phos 4.0---> meeting goals for ESRD    Metabolic Acidosis  Likely d/t noncompliance with HD regimen.   Resolved.     HLD  Continue statin therapy  Maintained on atorvastation 20 mg daily    COPD  Current daily smoker  Encouraged smoking cessation.   Plan per PCP    Okay for discharge from a renal standpoint at her usual outpatient HD clinic--> ELIU Bland  Nephrology  [facility]

## 2022-01-01 NOTE — PROGRESS NOTES
[facility] Hospital Medicine  Progress Note    Patient Name: Radha Jurado  MRN: [id number]  Patient Class: IP- Inpatient   Admission Date: 12/29/2021  Length of Stay: 3 days  Attending Physician: Emelia Delgadillo MD  Primary Care Provider: Jeovany Carpio MD        Subjective:     Principal Problem:ESRD (end stage renal disease) on dialysis        HPI:  Radha Jurado is a 71-year-old female who presents to the emergency room this morning complaining of shortness of breath.  She reports shortness of breath has been present for the past approximately 1 week and has progressively worsened.  She denies any fever or chills.  No known sick contacts or travel.  She is vaccinated for COVID.  She reports she has missed her last 5 hemodialysis sessions due to not feeling well and also social issues regarding transportation.  Previous medical history includes chronic diastolic heart failure, coronary artery disease, dialysis, hep C, hyperlipidemia, and active smoker.  ER workup:  CBC with baseline anemia.  CMP with BUN of 56, creatinine of 4.6, bicarb of 13. BNP 3832. Troponin mildly elevated 0.04.  Chest x-ray demonstrates cardiomegaly a with large right pleural effusion.  She is scheduled to follow-up with pulmonology for possible bronchoscopy.  Patient admitted to Hospital Medicine for observation management.  Will consult Nephrology for dialysis today.      Overview/Hospital Course:  No notes on file    Interval History: Patient seen and examined.  She reports better breathing today.  Has had some issues with dizziness with position changes and her BP is running low.  Will discuss to 250 cc bolus with nephrology.  Leukocytosis today with nursing reporting foul-smelling urine.  UA confirmed UTI will initiate Abx.  Ideally would like her to work with physical therapy safely AND establish home health prior to discharge for care of the PleurX tube.     Review of Systems   Constitutional: Positive for  fatigue. Negative for chills and fever.   HENT: Negative for congestion, postnasal drip and trouble swallowing.    Respiratory: Negative for cough, shortness of breath and wheezing.    Cardiovascular: Positive for chest pain (at pleurx insertion site- improved). Negative for leg swelling.   Gastrointestinal: Negative for abdominal pain, nausea and vomiting.   Genitourinary:        Foul smelling urine per nursing     Neurological: Positive for dizziness.     Objective:     Vital Signs (Most Recent):  Temp: 97.4 °F (36.3 °C) (01/01/22 0735)  Pulse: 86 (01/01/22 0811)  Resp: 16 (01/01/22 0914)  BP: (!) 107/54 (01/01/22 0735)  SpO2: 96 % (01/01/22 0811) Vital Signs (24h Range):  Temp:  [96 °F (35.6 °C)-99.1 °F (37.3 °C)] 97.4 °F (36.3 °C)  Pulse:  [80-87] 86  Resp:  [6-19] 16  SpO2:  [94 %-99 %] 96 %  BP: ()/(51-58) 107/54     Weight: 41.3 kg (91 lb)  Body mass index is 17.19 kg/m².    Intake/Output Summary (Last 24 hours) at 1/1/2022 1131  Last data filed at 1/1/2022 0800  Gross per 24 hour   Intake 120 ml   Output 200 ml   Net -80 ml      Physical Exam  Vitals and nursing note reviewed.   Constitutional:       Appearance: Normal appearance. She is ill-appearing (chronically ill appearing).   HENT:      Head: Normocephalic and atraumatic.      Mouth/Throat:      Mouth: Mucous membranes are moist.      Pharynx: Oropharynx is clear.   Eyes:      Extraocular Movements: Extraocular movements intact.      Conjunctiva/sclera: Conjunctivae normal.      Pupils: Pupils are equal, round, and reactive to light.   Cardiovascular:      Rate and Rhythm: Normal rate and regular rhythm.      Pulses: Normal pulses.   Pulmonary:      Effort: Pulmonary effort is normal. No respiratory distress.      Comments: Pleurx dressing CDI  Tube clamped  Abdominal:      General: Abdomen is flat. Bowel sounds are normal. There is no distension.      Palpations: Abdomen is soft.      Tenderness: There is no abdominal tenderness.   Skin:      General: Skin is warm and dry.      Capillary Refill: Capillary refill takes less than 2 seconds.   Neurological:      General: No focal deficit present.      Mental Status: She is alert.   Psychiatric:         Mood and Affect: Mood normal.         Significant Labs:   All pertinent labs within the past 24 hours have been reviewed.  CBC:   Recent Labs   Lab 01/01/22  0450   WBC 15.17*   HGB 9.0*   HCT 30.4*        CMP:   Recent Labs   Lab 01/01/22  0450      K 3.5      CO2 21*      BUN 8   CREATININE 2.3*   CALCIUM 8.5*   ALBUMIN 2.8*   ANIONGAP 14   EGFRNONAA 21*       Significant Imaging: I have reviewed all pertinent imaging results/findings within the past 24 hours.      Assessment/Plan:      * ESRD (end stage renal disease) on dialysis  Acute on chronic problem  Nephrology consulted.   Continue Chronic hemodialysis.   Monitor daily electrolytes and defer dialysis orders to nephrology.  Will plan for f/u with her routine HD unit on 1/3.      Acute cystitis without hematuria  -Initiate IV rocephin  -Follow culture and adjust antibiotics PRN.      Pleural effusion  Chronic problem  -Pulmonology following  -S/P RIGHT pleurx cath placement  -Will need education prior to d/c on use  -Placed to -20 cm suction yesterday.  800 cc out. CXR improved thereafter  -Clamped yesterday.  -Need accepting HH for discharge.          Moderate malnutrition  Chronic Problem  Nutrition consulted. Body mass index is 17.19 kg/m².. Encourage maximal PO intake. Diet supplementation ordered per nutrition approval. Will encourage PO and monitor closely for weight changes.      COPD (chronic obstructive pulmonary disease)  Chronic Problem  Patient's COPD is controlled currently.  Patient is currently off COPD Pathway. Continue scheduled inhalers Supplemental oxygen and monitor respiratory status closely.       Hypertension due to end stage renal disease on dialysis  Chronic Problem  Chronic, controlled.  Latest blood  pressure and vitals reviewed-   Temp:  [96 °F (35.6 °C)-99.1 °F (37.3 °C)]   Pulse:  [80-87]   Resp:  [6-19]   BP: ()/(51-58)   SpO2:  [94 %-99 %] .   Home meds for hypertension were reviewed and noted below.   Hypertension Medications             amLODIPine (NORVASC) 10 MG tablet Take 10 mg by mouth once daily.    hydrALAZINE (APRESOLINE) 25 MG tablet Take 1 tablet by mouth 2 (two) times daily.    metoprolol succinate (TOPROL-XL) 50 MG 24 hr tablet Take 1 tablet by mouth once daily.    nitroGLYCERIN (NITROSTAT) 0.4 MG SL tablet Place 1 tablet (0.4 mg total) under the tongue every 5 (five) minutes as needed for Chest pain (x3 if not better to ER).      Now hypotensive.  May give fluid today. Hold BP meds.    While in the hospital, will manage blood pressure as follows; Continue home antihypertensive regimen    Will utilize p.r.n. blood pressure medication only if patient's blood pressure greater than  180/110 and she develops symptoms such as worsening chest pain or shortness of breath.      Hyperlipidemia   Patient is chronically on statin.will continue for now. Monitor clinically. Last LDL was   Lab Results   Component Value Date    LDLCALC 21.6 (L) 05/25/2021            Tobacco abuse  Chronic Problem  Dangers of cigarette smoking were reviewed with patient in detail for 3 minutes and patient was encouraged to quit. Nicotine replacement options were discussed. She declines      VTE Risk Mitigation (From admission, onward)         Ordered     Place CARMEN hose  Until discontinued         12/29/21 0526     IP VTE HIGH RISK PATIENT  Once         12/29/21 0526     Place sequential compression device  Until discontinued         12/29/21 0526                Discharge Planning   JUAN: 1/2/2022      Code Status: Full Code   Is the patient medically ready for discharge?:     Reason for patient still in hospital (select all that apply): Patient new problem, Patient trending condition, Treatment and Consult  recommendations  Discharge Plan A: Home with family                  April Spain NP  Department of Hospital Medicine   [facility]

## 2022-01-01 NOTE — PLAN OF CARE
Pt alert and oriented. Worked with PT today. Bolus given. Pt less dizzy. Plan of care continued. Call light in reach.

## 2022-01-01 NOTE — ASSESSMENT & PLAN NOTE
Chronic Problem  Chronic, controlled.  Latest blood pressure and vitals reviewed-   Temp:  [96 °F (35.6 °C)-99.1 °F (37.3 °C)]   Pulse:  [80-87]   Resp:  [6-19]   BP: ()/(51-58)   SpO2:  [94 %-99 %] .   Home meds for hypertension were reviewed and noted below.   Hypertension Medications             amLODIPine (NORVASC) 10 MG tablet Take 10 mg by mouth once daily.    hydrALAZINE (APRESOLINE) 25 MG tablet Take 1 tablet by mouth 2 (two) times daily.    metoprolol succinate (TOPROL-XL) 50 MG 24 hr tablet Take 1 tablet by mouth once daily.    nitroGLYCERIN (NITROSTAT) 0.4 MG SL tablet Place 1 tablet (0.4 mg total) under the tongue every 5 (five) minutes as needed for Chest pain (x3 if not better to ER).      Now hypotensive.  May give fluid today. Hold BP meds.    While in the hospital, will manage blood pressure as follows; Continue home antihypertensive regimen    Will utilize p.r.n. blood pressure medication only if patient's blood pressure greater than  180/110 and she develops symptoms such as worsening chest pain or shortness of breath.

## 2022-01-01 NOTE — PT/OT/SLP EVAL
Physical Therapy Evaluation    Patient Name:  Radha Jurado   MRN:  [id number]    Recommendations:     Discharge Recommendations:  home,home with home health,home health PT   Discharge Equipment Recommendations:  (TBD (may need rolling walker))   Barriers to discharge: None    Assessment:     Radha Jurado is a 71 y.o. female admitted with a medical diagnosis of ESRD (end stage renal disease) on dialysis.  She presents with the following impairments/functional limitations:  weakness,impaired balance,impaired endurance,impaired functional mobilty,gait instability,decreased lower extremity function,impaired cardiopulmonary response to activity  .    Rehab Prognosis: Good and Fair; patient would benefit from acute skilled PT services to address these deficits and reach maximum level of function.    Recent Surgery: Procedure(s) (LRB):  Thoracentesis, VATS, Pleurex (Right) 2 Days Post-Op    Plan:     During this hospitalization, patient to be seen 6 x/week to address the identified rehab impairments via gait training,therapeutic activities,therapeutic exercises and progress toward the following goals:    · Plan of Care Expires:  01/15/22    Subjective     Patient/Family Comments/goals: agrees to walk, feels legs weak, wants to lie down after due to mild dizzy    Living Environment:  House with , with 4 steps (with rail) to enter  Prior to admission, patients level of function was used rollator for months.  Equipment used at home: rollator.    Upon discharge, patient will have assistance from .    Objective:     Communicated with nurse (Delbert) prior to session.  Patient found supine with telemetry,peripheral IV,bed alarm (Telesitter)  upon PT entry to room.    General Precautions: Standard, fall   Orthopedic Precautions:N/A   Braces: N/A  Respiratory Status: Room air     Orthostatic BP: (RUE): supine: 106/53; seated: 102/55; standin/53; seated (post ambulation): 113/55    Functional Mobility  training:  · Bed Mobility:     · Rolling Right: contact guard assistance  · Supine to Sit: contact guard assistance  · Sit to Supine: contact guard assistance  · Transfers:     · Sit to Stand:  contact guard assistance with rolling walker and  x 2 reps  · Gait: 25' x 2 with RW with CGA    Therapeutic Activities and Exercises:   mobility training as above with cues for technique      AM-PAC 6 CLICK MOBILITY  Total Score:18     Patient left supine with all lines intact, call button in reach and bed alarm on.    GOALS:   Multidisciplinary Problems     Physical Therapy Goals        Problem: Physical Therapy Goal    Goal Priority Disciplines Outcome Goal Variances Interventions   Physical Therapy Goal     PT, PT/OT Ongoing, Progressing     Description: Goals to be met by: 01/15/2022     Patient will increase functional independence with mobility by performin). Supine to sit with Stand-by Assistance  2). Sit to supine with Stand-by Assistance  3). Sit to stand transfer with Stand-by Assistance  4). Gait  x > 50 feet with Stand-by Assistance using Rolling Walker.                      History:     Past Medical History:   Diagnosis Date    Carotid artery occlusion     Chronic diastolic CHF (congestive heart failure)     COPD (chronic obstructive pulmonary disease)     Coronary artery disease     Depression     End-stage renal disease on hemodialysis     History of gastric ulcer     Hyperlipidemia     Hypertension     Migraine headache     Myocardial infarction     Osteoarthritis     Peripheral artery disease        Past Surgical History:   Procedure Laterality Date    APPENDECTOMY  unknown    BLADDER SUSPENSION N/A     with Mesh    CARDIAC CATHETERIZATION      MI w/PCI @ [facility]    CAROTID ENDARTERECTOMY Right 2021    Procedure: ENDARTERECTOMY-CAROTID;  Surgeon: Joseph Bello MD;  Location: [facility];  Service: Peripheral Vascular;  Laterality: Right;    CATHETERIZATION OF BOTH LEFT  AND RIGHT HEART  06/25/2020    Right brachial accessed    CHOLECYSTECTOMY N/A 2017    COLONOSCOPY N/A 10/5/2020    Procedure: COLONOSCOPY;  Surgeon: Merlin Keller MD;  Location: [facility] [place] ENDO;  Service: Endoscopy;  Laterality: N/A;    CREATION OF AXILLARY-FEMORAL ARTERY BYPASS WITH GRAFT Right 8/20/2019    Procedure: CREATION, BYPASS, ARTERIAL, AXILLARY TO FEMORAL, USING GRAFT;  Surgeon: Joseph Bello MD;  Location: [facility] OR;  Service: Cardiovascular;  Laterality: Right;    ESOPHAGOGASTRODUODENOSCOPY N/A 10/5/2020    Procedure: EGD (ESOPHAGOGASTRODUODENOSCOPY);  Surgeon: Merlin Keller MD;  Location: [facility] [place] ENDO;  Service: Endoscopy;  Laterality: N/A;    FISTULOGRAM Left 8/8/2019    Procedure: FISTULOGRAM;  Surgeon: Calixto Javed MD;  Location: [facility] CATH LAB;  Service: Nephrology;  Laterality: Left;    FISTULOGRAM Left 5/19/2020    Procedure: FISTULOGRAM;  Surgeon: Janell Delgado MD;  Location: [facility] CATH LAB;  Service: Nephrology;  Laterality: Left;    HYSTERECTOMY      LEFT HEART CATHETERIZATION Right 6/25/2020    Procedure: Left heart cath;  Surgeon: Alex Mejía MD;  Location: [facility] [place] CATH LAB;  Service: Cardiology;  Laterality: Right;  brachial access    LEFT HEART CATHETERIZATION Right 10/26/2020    Procedure: CATHETERIZATION, HEART, LEFT;  Surgeon: Lester De La Fuente MD;  Location: [facility] [place] CATH LAB;  Service: Cardiology;  Laterality: Right;    NEPHRECTOMY Right 1977    OOPHORECTOMY         Time Tracking:     PT Received On: 01/01/22  PT Start Time: 1251     PT Stop Time: 1311  PT Total Time (min): 20 min     Billable Minutes: Evaluation 10 and Therapeutic Activity 10      01/01/2022

## 2022-01-01 NOTE — SUBJECTIVE & OBJECTIVE
Interval History: Patient seen and examined.  She reports better breathing today.  Has had some issues with dizziness with position changes and her BP is running low.  Will discuss to 250 cc bolus with nephrology.  Leukocytosis today with nursing reporting foul-smelling urine.  UA confirmed UTI will initiate Abx.  Ideally would like her to work with physical therapy safely AND establish home health prior to discharge for care of the PleurX tube.     Review of Systems   Constitutional: Positive for fatigue. Negative for chills and fever.   HENT: Negative for congestion, postnasal drip and trouble swallowing.    Respiratory: Negative for cough, shortness of breath and wheezing.    Cardiovascular: Positive for chest pain (at pleurx insertion site- improved). Negative for leg swelling.   Gastrointestinal: Negative for abdominal pain, nausea and vomiting.   Genitourinary:        Foul smelling urine per nursing     Neurological: Positive for dizziness.     Objective:     Vital Signs (Most Recent):  Temp: 97.4 °F (36.3 °C) (01/01/22 0735)  Pulse: 86 (01/01/22 0811)  Resp: 16 (01/01/22 0914)  BP: (!) 107/54 (01/01/22 0735)  SpO2: 96 % (01/01/22 0811) Vital Signs (24h Range):  Temp:  [96 °F (35.6 °C)-99.1 °F (37.3 °C)] 97.4 °F (36.3 °C)  Pulse:  [80-87] 86  Resp:  [6-19] 16  SpO2:  [94 %-99 %] 96 %  BP: ()/(51-58) 107/54     Weight: 41.3 kg (91 lb)  Body mass index is 17.19 kg/m².    Intake/Output Summary (Last 24 hours) at 1/1/2022 1131  Last data filed at 1/1/2022 0800  Gross per 24 hour   Intake 120 ml   Output 200 ml   Net -80 ml      Physical Exam  Vitals and nursing note reviewed.   Constitutional:       Appearance: Normal appearance. She is ill-appearing (chronically ill appearing).   HENT:      Head: Normocephalic and atraumatic.      Mouth/Throat:      Mouth: Mucous membranes are moist.      Pharynx: Oropharynx is clear.   Eyes:      Extraocular Movements: Extraocular movements intact.      Conjunctiva/sclera:  Conjunctivae normal.      Pupils: Pupils are equal, round, and reactive to light.   Cardiovascular:      Rate and Rhythm: Normal rate and regular rhythm.      Pulses: Normal pulses.   Pulmonary:      Effort: Pulmonary effort is normal. No respiratory distress.      Comments: Pleurx dressing CDI  Tube clamped  Abdominal:      General: Abdomen is flat. Bowel sounds are normal. There is no distension.      Palpations: Abdomen is soft.      Tenderness: There is no abdominal tenderness.   Skin:     General: Skin is warm and dry.      Capillary Refill: Capillary refill takes less than 2 seconds.   Neurological:      General: No focal deficit present.      Mental Status: She is alert.   Psychiatric:         Mood and Affect: Mood normal.         Significant Labs:   All pertinent labs within the past 24 hours have been reviewed.  CBC:   Recent Labs   Lab 01/01/22  0450   WBC 15.17*   HGB 9.0*   HCT 30.4*        CMP:   Recent Labs   Lab 01/01/22  0450      K 3.5      CO2 21*      BUN 8   CREATININE 2.3*   CALCIUM 8.5*   ALBUMIN 2.8*   ANIONGAP 14   EGFRNONAA 21*       Significant Imaging: I have reviewed all pertinent imaging results/findings within the past 24 hours.

## 2022-01-01 NOTE — PLAN OF CARE
SW sent patient information to [facility], [facility], [facility], [facility], Dirk Vergara, [employer], via [employer] system.       01/01/22 0833   Post-Acute Status   Post-Acute Authorization Home Health   Home Health Status Referrals Sent

## 2022-01-01 NOTE — PLAN OF CARE
Problem: Physical Therapy Goal  Goal: Physical Therapy Goal  Description: Goals to be met by: 01/15/2022     Patient will increase functional independence with mobility by performin). Supine to sit with Stand-by Assistance  2). Sit to supine with Stand-by Assistance  3). Sit to stand transfer with Stand-by Assistance  4). Gait  x > 50 feet with Stand-by Assistance using Rolling Walker.     Outcome: Ongoing, Progressing

## 2022-01-01 NOTE — PROGRESS NOTES
"[facility]  Nephrology  Consult Note    Patient Name: Radha Jurado  MRN: [id number]  Admission Date: 12/29/2021  Hospital Length of Stay: 3 days  Attending Provider: Emelia Delgadillo MD   Primary Care Physician: Jeovany Carpio MD  Principal Problem:ESRD (end stage renal disease) on dialysis      Subjective:     HPI:   Radha Jurado is a 71 year old  female with a past medical history significant for ESRD , hypertension, hyperlipidemia, remote history of MI, coronary artery disease, COPD, chronic diastolic congestive heart failure, PVD,  Hepatitis-C, and osteoarthritis. She presented to the emergency department reporting shortness of breath.  Patient  missed her last of 5 hemodialysis sessions d/t "not feeling well". Denied transportation  (offered assistance is she was having issues).  Laboratory evaluation upon arrival revealed a serum sodium of 140, potassium 3.8, chloride 112, CO2 seen, BUN 56, creatinine 4.6, GFR 9, glucose 81, calcium 10.0, albumin 3.1, BNP 3832, troponin is 0.044, hemoglobin 9.4, hematocrit 32.5, WBC 7.8, platelet count 306.  Nephrology consulted for hemodialysis management while in the inpatient setting.    Interval History:  Stable  from renal standpoint  Awaiting discharge--> home health company being sought to manage pleurx catheter    Past Medical History:   Diagnosis Date    Carotid artery occlusion     Chronic diastolic CHF (congestive heart failure)     COPD (chronic obstructive pulmonary disease)     Coronary artery disease     Depression     End-stage renal disease on hemodialysis     History of gastric ulcer     Hyperlipidemia     Hypertension     Migraine headache     Myocardial infarction     Osteoarthritis     Peripheral artery disease        Past Surgical History:   Procedure Laterality Date    APPENDECTOMY  unknown    BLADDER SUSPENSION N/A 2005    with Mesh    CARDIAC CATHETERIZATION  2009    MI w/PCI @ [facility]    CAROTID " ENDARTERECTOMY Right 6/1/2021    Procedure: ENDARTERECTOMY-CAROTID;  Surgeon: Joseph Bello MD;  Location: [facility] OR;  Service: Peripheral Vascular;  Laterality: Right;    CATHETERIZATION OF BOTH LEFT AND RIGHT HEART  06/25/2020    Right brachial accessed    CHOLECYSTECTOMY N/A 2017    COLONOSCOPY N/A 10/5/2020    Procedure: COLONOSCOPY;  Surgeon: Merlin Keller MD;  Location: [facility] [place] ENDO;  Service: Endoscopy;  Laterality: N/A;    CREATION OF AXILLARY-FEMORAL ARTERY BYPASS WITH GRAFT Right 8/20/2019    Procedure: CREATION, BYPASS, ARTERIAL, AXILLARY TO FEMORAL, USING GRAFT;  Surgeon: Joseph Bello MD;  Location: [facility] OR;  Service: Cardiovascular;  Laterality: Right;    ESOPHAGOGASTRODUODENOSCOPY N/A 10/5/2020    Procedure: EGD (ESOPHAGOGASTRODUODENOSCOPY);  Surgeon: Merlin Keller MD;  Location: [facility] [place] ENDO;  Service: Endoscopy;  Laterality: N/A;    FISTULOGRAM Left 8/8/2019    Procedure: FISTULOGRAM;  Surgeon: Calixto Javed MD;  Location: [facility] CATH LAB;  Service: Nephrology;  Laterality: Left;    FISTULOGRAM Left 5/19/2020    Procedure: FISTULOGRAM;  Surgeon: Janell Delgado MD;  Location: [facility] CATH LAB;  Service: Nephrology;  Laterality: Left;    HYSTERECTOMY      LEFT HEART CATHETERIZATION Right 6/25/2020    Procedure: Left heart cath;  Surgeon: Alex Mejía MD;  Location: [facility] [place] CATH LAB;  Service: Cardiology;  Laterality: Right;  brachial access    LEFT HEART CATHETERIZATION Right 10/26/2020    Procedure: CATHETERIZATION, HEART, LEFT;  Surgeon: Lester De La Fuente MD;  Location: [facility] [place] CATH LAB;  Service: Cardiology;  Laterality: Right;    NEPHRECTOMY Right 1977    OOPHORECTOMY         Review of patient's allergies indicates:   Allergen Reactions    Dulcolax [bisacodyl] Other (See Comments)     Sweating a lot, weakness    Docusate sodium      Other reaction(s): Makes sweat profusely; Weak    Dulcagen      Current Facility-Administered Medications   Medication Frequency    0.9%  NaCl  infusion Continuous    acetaminophen tablet 650 mg Q4H PRN    acetaminophen tablet 650 mg Q6H PRN    albuterol-ipratropium 2.5 mg-0.5 mg/3 mL nebulizer solution 3 mL Q4H PRN    aluminum-magnesium hydroxide-simethicone 200-200-20 mg/5 mL suspension 30 mL QID PRN    amLODIPine tablet 10 mg Daily    aspirin EC tablet 81 mg Daily    atorvastatin tablet 20 mg Daily    cefTRIAXone (ROCEPHIN) 1 g/50 mL D5W IVPB Q24H    clopidogreL tablet 75 mg Daily    cyproheptadine 4 mg tablet 4 mg BID    dextrose 50% injection 12.5 g PRN    dextrose 50% injection 25 g PRN    glucagon (human recombinant) injection 1 mg PRN    glucose chewable tablet 16 g PRN    glucose chewable tablet 24 g PRN    hydrALAZINE tablet 25 mg BID    magnesium oxide tablet 800 mg PRN    magnesium oxide tablet 800 mg PRN    melatonin tablet 9 mg Nightly PRN    metoprolol succinate (TOPROL-XL) 24 hr tablet 50 mg Daily    mupirocin 2 % ointment BID    naloxone 0.4 mg/mL injection 0.02 mg PRN    nitroGLYCERIN SL tablet 0.4 mg Q5 Min PRN    ondansetron injection 4 mg Q8H PRN    potassium, sodium phosphates 280-160-250 mg packet 2 packet PRN    potassium, sodium phosphates 280-160-250 mg packet 2 packet PRN    potassium, sodium phosphates 280-160-250 mg packet 2 packet PRN    simethicone chewable tablet 80 mg QID PRN    sodium chloride 0.9% bolus 250 mL Once    sodium chloride 0.9% flush 10 mL Q8H PRN    traMADoL tablet 50 mg Q6H PRN     Facility-Administered Medications Ordered in Other Encounters   Medication Frequency    0.9%  NaCl infusion Continuous     Family History     Problem Relation (Age of Onset)    Cancer Mother, Father    Heart attack Father    Heart disease Father    Heart failure Father    Hypertension Father        Tobacco Use    Smoking status: Light Tobacco Smoker     Packs/day: 0.25     Years: 50.00     Pack years: 12.50     Types: Cigarettes     Start date: 1962    Smokeless tobacco: Never Used    Tobacco  comment: Do not smoke day of surgery   Substance and Sexual Activity    Alcohol use: Not Currently     Comment: holidays    Drug use: No    Sexual activity: Not Currently     Partners: Male       Objective:     Vital Signs (Most Recent):  Temp: 97.5 °F (36.4 °C) (01/01/22 1205)  Pulse: 81 (01/01/22 1205)  Resp: 16 (01/01/22 1205)  BP: (!) 123/57 (01/01/22 1205)  SpO2: 97 % (01/01/22 1205)  O2 Device (Oxygen Therapy): room air (01/01/22 0811) Vital Signs (24h Range):  Temp:  [96 °F (35.6 °C)-99.1 °F (37.3 °C)] 97.5 °F (36.4 °C)  Pulse:  [80-87] 81  Resp:  [6-19] 16  SpO2:  [94 %-99 %] 97 %  BP: ()/(51-58) 123/57     Weight: 41.3 kg (91 lb) (12/30/21 0959)  Body mass index is 17.19 kg/m².  Body surface area is 1.33 meters squared.    I/O last 3 completed shifts:  In: 240 [P.O.:240]  Out: 200 [Urine:200]    Physical Exam  Vitals and nursing note reviewed.   Constitutional:       Appearance: She is ill-appearing (Chronically ill appearing).   HENT:      Head: Normocephalic and atraumatic.      Mouth/Throat:      Mouth: Mucous membranes are moist.   Neck:      Vascular: JVD present.   Cardiovascular:      Rate and Rhythm: Normal rate and regular rhythm.      Pulses: Normal pulses.      Heart sounds: Normal heart sounds. No friction rub. No gallop.    Pulmonary:      Effort: Pulmonary effort is normal.      Breath sounds: No rales.   Abdominal:      General: Abdomen is flat. Bowel sounds are normal. There is no distension.      Palpations: Abdomen is soft.      Tenderness: There is no abdominal tenderness. There is no guarding or rebound.   Musculoskeletal:         General: Normal range of motion.      Cervical back: Normal range of motion. No rigidity.      Right lower leg: No edema.      Left lower leg: No edema.   Skin:     General: Skin is warm.      Capillary Refill: Capillary refill takes less than 2 seconds.      Findings: No bruising, erythema, lesion or rash.   Neurological:      General: No focal  deficit present.      Mental Status: She is alert and oriented to person, place, and time. Mental status is at baseline.      Motor: Weakness present.   Psychiatric:         Mood and Affect: Mood normal.         Behavior: Behavior normal.         Thought Content: Thought content normal.         Judgment: Judgment normal.         Significant Labs:  ABGs: No results for input(s): PH, PCO2, HCO3, POCSATURATED, BE in the last 168 hours.  BMP:   Recent Labs   Lab 01/01/22  0450         K 3.5      CO2 21*   BUN 8   CREATININE 2.3*   CALCIUM 8.5*     Cardiac Markers: No results for input(s): CKMB, TROPONINT, MYOGLOBIN in the last 168 hours.  CBC:   Recent Labs   Lab 01/01/22  0450   WBC 15.17*   RBC 3.08*   HGB 9.0*   HCT 30.4*      MCV 99*   MCH 29.2   MCHC 29.6*     CMP:   Recent Labs   Lab 12/29/21  0210 12/30/21  0813 01/01/22  0450   GLU 81   < > 100   CALCIUM 10.0   < > 8.5*   ALBUMIN 3.1*   < > 2.8*   PROT 7.3  --   --       < > 138   K 3.8   < > 3.5   CO2 13*   < > 21*   *   < > 103   BUN 56*   < > 8   CREATININE 4.6*   < > 2.3*   ALKPHOS 117  --   --    ALT 7*  --   --    AST 11  --   --    BILITOT 0.3  --   --     < > = values in this interval not displayed.     Coagulation: No results for input(s): PT, INR, APTT in the last 168 hours.  LFTs:   Recent Labs   Lab 12/29/21 0210 12/30/21  0813 01/01/22  0450   ALT 7*  --   --    AST 11  --   --    ALKPHOS 117  --   --    BILITOT 0.3  --   --    PROT 7.3  --   --    ALBUMIN 3.1*   < > 2.8*    < > = values in this interval not displayed.     Microbiology Results (last 7 days)     Procedure Component Value Units Date/Time    Urine Culture High Risk [[id number]] Collected: 01/01/22 1136    Order Status: Sent Specimen: Urine, Catheterized Updated: 01/01/22 1136        Specimen (24h ago, onward)            None        PTH:   Recent Labs   Lab 12/30/21  1005   .4*     TSH: No results for input(s): TSH in the last 168  hours.  Recent Labs   Lab 01/01/22  1115   COLORU Yellow   SPECGRAV >=1.030*   PHUR 5.0   PROTEINUA 3+*   BACTERIA Few*   NITRITE Positive*   LEUKOCYTESUR Negative   UROBILINOGEN Negative   HYALINECASTS 0     All labs within the past 24 hours have been reviewed.    Significant Imaging:  EXAMINATION:  XR CHEST AP PORTABLE     CLINICAL HISTORY:  CHF;     TECHNIQUE:  Single frontal view of the chest was performed.     COMPARISON:  Chest of December 13, 2021     FINDINGS:  There remains a large right pleural effusion and infiltrate in the right perihilar and a with opacification of the right middle and right lower lobes.  Cardiac size is obscured.  The left lung appears clear.     Impression:     Continued large right pleural effusion with infiltrate and atelectasis in the right middle lobe and right lower lobe.  Right perihilar infiltrate remains        Electronically signed by: Danielito Wong MD  Date:                                            12/29/2021  Time:                                           07:52    Assessment/Plan:     Active Diagnoses:    Diagnosis Date Noted POA    PRINCIPAL PROBLEM:  ESRD (end stage renal disease) on dialysis [N18.6, Z99.2] 03/14/2018 Not Applicable    Acute cystitis without hematuria [N30.00] 01/01/2022 Yes    Pleural effusion [J90] 06/11/2020 Yes    Moderate malnutrition [E44.0] 02/22/2018 Yes    COPD (chronic obstructive pulmonary disease) [J44.9] 02/21/2018 Yes    Tobacco abuse [Z72.0] 11/08/2012 Yes    Hyperlipidemia [E78.5] 11/08/2012 Yes    Hypertension due to end stage renal disease on dialysis [I12.0, N18.6, Z99.2] 11/08/2012 Not Applicable      Problems Resolved During this Admission:     ESRD on HD  Maintain HD schedule  No acute need for RRT today  Can discharge with f/u at her regular HD clinic from a nephrology standpoint once cleared by pulmonology/primary  If she remains in hospital Monday morning, we will perform RRT Monday     Hypervolemia  Manage with  HD/UF    Right Pleural Effusion  Large right pleural effusion with infiltrate and atelectasis in the right middle lobe and right lower lobe  S/p pleurx drain 12/30    Hypertension  Improved   Maintained on amlodipine , hydralazine, and Toprol XL  Continue to challenge UF and monitor.     Macrocytic Anemia  Hgb below goal at 9.2  Retacrit 50 unit/kg IV x 1 dose 12/30  B12 and folate levels wnl    Renal MBD  cCa 9.4, , Phos 4.0---> meeting goals for ESRD    Metabolic Acidosis  Likely d/t noncompliance with HD regimen.   Resolved.     HLD  Continue statin therapy  Maintained on atorvastation 20 mg daily    COPD  Current daily smoker  Encouraged smoking cessation.   Plan per PCP    Okay for discharge from a renal standpoint at her usual outpatient HD clinic--> ELIU Bland  Nephrology  [facility]

## 2022-01-01 NOTE — NURSING
Bathroom light was alarming. Nurse in to check. Pt  standing over pt as pt was laying on her back on the floor. Pt  said pt needed to go to the bathroom and he walked her there but she got dizzy and started to fall. He helped pt to floor.  Pt states no injuries nor hit her head. V/S done. Pt assisted up and to toilet. Tele sitter ordered and put in room. Bed alarm on.

## 2022-01-02 NOTE — DISCHARGE INSTRUCTIONS
He will complete 5 more days of oral Abx for UTI.  Please take your 1st dose after dialysis on dialysis days.    Your PleurX catheter will need to be drained when you are feeling short of breath.  Please be very careful not to contaminate the area or pull the tube.  Do not miss HD.    Thank you for choosing [facility] for your medical care. The primary doctor who is taking care of you at the time of your discharge is Emelia Delgadillo MD.     You were admitted to the hospital with ESRD (end stage renal disease) on dialysis.     Please note your discharge instructions, including diet/activity restrictions, follow-up appointments, and medication changes.  If you have any questions about your medical issues, prescriptions, or any other questions, please feel free to contact the [facility] Medicine Dept at [phone] and we will help.    If you are previously with Home health, outpatient PT/OT or under a therapy program, you are cleared to return to those programs.    Please direct all long term medication refills and follow up to your primary care provider, Jeovany Carpio MD. Thank you again for letting us take care of your health care needs.    Please note the following discharge instructions per your discharging physician-  April Spain NP

## 2022-01-02 NOTE — PROGRESS NOTES
Progress Note  PULMONARY    Admit Date: 12/29/2021 01/01/2022      SUBJECTIVE:     12/30- for pleurX today, no new complaints  12/31 the patient's PleurX has been at suction because there was a tiny pneumothorax after placement of the PleurX.  There is no leak.  The drain has drained approximately 800 cc so far.  It is time to cap it and then the patient can be discharged at the primary's convenience.  1/1 the patient is still hospitalized because the home health company is being sought that will be able to manage her PleurX.    OBJECTIVE:     Vitals (Most recent):  Vitals:    01/01/22 1611   BP: (!) 114/58   Pulse: 93   Resp: 16   Temp: 97.3 °F (36.3 °C)       Vitals (24 hour range):  Temp:  [97.3 °F (36.3 °C)-99.1 °F (37.3 °C)]   Pulse:  [80-93]   Resp:  [6-19]   BP: ()/(51-58)   SpO2:  [94 %-98 %]       Intake/Output Summary (Last 24 hours) at 1/1/2022 1814  Last data filed at 1/1/2022 1300  Gross per 24 hour   Intake 360 ml   Output --   Net 360 ml          Physical Exam:  The patient's neuro status (alertness,orientation,cognitive function,motor skills,), pharyngeal exam (oral lesions, hygiene, abn dentition,), Neck (jvd,mass,thyroid,nodes in neck and above/below clavicle),RESPIRATORY(symmetry,effort,fremitus,percussion,auscultation),  Cor(rhythm,heart tones including gallops,perfusion,edema)ABD(distention,hepatic&splenomegaly,tenderness,masses), Skin(rash,cyanosis),Psyc(affect,judgement,).  Exam negative except for these pertinent findings:    Awake, alert, pale and chronically ill appearing  Crackles on the right, clear on left  R pleurX dressed  abd soft nontender  LUE fistula  No edema    Radiographs reviewed: view by direct vision   CXR 12/30- post pleurX with pneumothorax, subq air  Chest x-ray 12/31 there is a tiny apical pneumothorax still visible.  There are increased interstitial markings diffusely.  There is right lower lobe atelectasis and a small left effusion.    Labs     Recent Labs   Lab  01/01/22  0450   WBC 15.17*   HGB 9.0*   HCT 30.4*        Recent Labs   Lab 01/01/22  0450      K 3.5      CO2 21*   BUN 8   CREATININE 2.3*      CALCIUM 8.5*   PHOS 2.7   ALBUMIN 2.8*   No results for input(s): PH, PCO2, PO2, HCO3 in the last 24 hours.  Microbiology Results (last 7 days)     Procedure Component Value Units Date/Time    Urine Culture High Risk [[id number]] Collected: 01/01/22 1136    Order Status: Sent Specimen: Urine, Catheterized Updated: 01/01/22 1136          Impression:  Active Hospital Problems    Diagnosis  POA    *ESRD (end stage renal disease) on dialysis [N18.6, Z99.2]  Not Applicable    Acute cystitis without hematuria [N30.00]  Yes    Pleural effusion [J90]  Yes    Moderate malnutrition [E44.0]  Yes    COPD (chronic obstructive pulmonary disease) [J44.9]  Yes    Tobacco abuse [Z72.0]  Yes    Hyperlipidemia [E78.5]  Yes    Hypertension due to end stage renal disease on dialysis [I12.0, N18.6, Z99.2]  Not Applicable      Resolved Hospital Problems   No resolved problems to display.               Plan:     Home health being set up  PleurX will need to be drained when she becomes dyspneic  The patient has oxygen at home which she uses p.r.n., current sat is 94% on room air  Please call if I can be of assistance

## 2022-01-02 NOTE — CARE UPDATE
01/01/22 2030   Patient Assessment/Suction   Level of Consciousness (AVPU) alert   Respiratory Effort Normal;Unlabored   PRE-TX-O2   O2 Device (Oxygen Therapy) room air   SpO2 97 %   Pulse Oximetry Type Intermittent   Aerosol Therapy   $ Aerosol Therapy Charges PRN treatment not required   Respiratory Treatment Status (SVN) PRN treatment not required

## 2022-01-02 NOTE — HOSPITAL COURSE
Pt was monitored closely during her stay.  She underwent hemodialysis under the direction of Nephrology while here in the hospital.  Pulmonology was consulted and after discussion with Pt and family consult general surgeon for PleurX catheter placement for chronic pleural effusion management.  PleurX catheter was placed on 12/30.  This was connected to suction thereafter with 800 cc of fluid drained.  Pt had great symptomatic relief.  She continued with HD and experience some dizziness which responded very well to a small os fluid bolus.  She worked with PT/OT who recommended home health which was ordered.  Case management worked to find a home health company who could assist with PleurX management.  She spiked her WBC count on 01/01 and investigation revealed UTI.  She was initiated on Abx.  WBC elevation resolved and she remained afebrile.  Pt felt well and was eager to discharge.  She was D/C on 01/02 with home health who will help to manage her PleurX catheter, draining when she is short of breath.  She will follow-up with pulmonology in clinic.  Long discussion regarded importance of compliance with her HD regimen.  She verbalized understanding.  She will follow-up as scheduled for her HD appointment tomorrow.  Return precautions were discussed.  Pt verbalized understanding and eagerness for discharge.    Physical exam:  Heart rate rhythm regular, lungs clear to auscultation  PleurX catheter clamped and dressed with no drainage noted.

## 2022-01-02 NOTE — DISCHARGE SUMMARY
[facility] Hospital Medicine  Discharge Summary      Patient Name: Radha Jurado  MRN: [id number]  Patient Class: IP- Inpatient  Admission Date: 12/29/2021  Hospital Length of Stay: 4 days  Discharge Date and Time: Evie Anguiano MD  Attending Physician: Emelia Delgadillo MD   Discharging Provider: April Spain NP  Primary Care Provider: Jeovany Carpio MD      HPI:   Radha Jurado is a 71-year-old female who presents to the emergency room this morning complaining of shortness of breath.  She reports shortness of breath has been present for the past approximately 1 week and has progressively worsened.  She denies any fever or chills.  No known sick contacts or travel.  She is vaccinated for COVID.  She reports she has missed her last 5 hemodialysis sessions due to not feeling well and also social issues regarding transportation.  Previous medical history includes chronic diastolic heart failure, coronary artery disease, dialysis, hep C, hyperlipidemia, and active smoker.  ER workup:  CBC with baseline anemia.  CMP with BUN of 56, creatinine of 4.6, bicarb of 13. BNP 3832. Troponin mildly elevated 0.04.  Chest x-ray demonstrates cardiomegaly a with large right pleural effusion.  She is scheduled to follow-up with pulmonology for possible bronchoscopy.  Patient admitted to Hospital Medicine for observation management.  Will consult Nephrology for dialysis today.      Procedure(s) (LRB):  Thoracentesis, VATS, Pleurex (Right)      Hospital Course:   Pt was monitored closely during her stay.  She underwent hemodialysis under the direction of Nephrology while here in the hospital.  Pulmonology was consulted and after discussion with Pt and family consult general surgeon for PleurX catheter placement for chronic pleural effusion management.  PleurX catheter was placed on 12/30.  This was connected to suction thereafter with 800 cc of fluid drained.  Pt had great symptomatic relief.  She continued  with HD and experience some dizziness which responded very well to a small os fluid bolus.  She worked with PT/OT who recommended home health which was ordered.  Case management worked to find a home health company who could assist with PleurX management.  She spiked her WBC count on 01/01 and investigation revealed UTI.  She was initiated on Abx.  WBC elevation resolved and she remained afebrile.  Pt felt well and was eager to discharge.  She was D/C on 01/02 with home health who will help to manage her PleurX catheter, draining when she is short of breath.  She will follow-up with pulmonology in clinic.  Long discussion regarded importance of compliance with her HD regimen.  She verbalized understanding.  She will follow-up as scheduled for her HD appointment tomorrow.  Return precautions were discussed.  Pt verbalized understanding and eagerness for discharge.    Physical exam:  Heart rate rhythm regular, lungs clear to auscultation  PleurX catheter clamped and dressed with no drainage noted.       Goals of Care Treatment Preferences:  Code Status: Full Code      Consults:   Consults (From admission, onward)        Status Ordering Provider     Inpatient consult to General Surgery  Once        Provider:  Tay Jernigan MD    Completed SAÚL AHUMADA     Inpatient consult to Pulmonology  Once        Provider:  Saúl Ahumada MD    Completed SAMIR REYNOLDS     Inpatient consult to Nephrology  Once        Provider:  Braden Dooley MD    Completed SAMIR REYNOLDS     IP consult to dietary  Once        Provider:  (Not yet assigned)    Completed SAMIR REYNOLDS          No new Assessment & Plan notes have been filed under this hospital service since the last note was generated.  Service: Hospital Medicine    Final Active Diagnoses:    Diagnosis Date Noted POA    PRINCIPAL PROBLEM:  ESRD (end stage renal disease) on dialysis [N18.6, Z99.2] 03/14/2018 Not Applicable    Acute cystitis without  hematuria [N30.00] 01/01/2022 Yes    Pleural effusion [J90] 06/11/2020 Yes    Moderate malnutrition [E44.0] 02/22/2018 Yes    COPD (chronic obstructive pulmonary disease) [J44.9] 02/21/2018 Yes    Tobacco abuse [Z72.0] 11/08/2012 Yes    Hyperlipidemia [E78.5] 11/08/2012 Yes    Hypertension due to end stage renal disease on dialysis [I12.0, N18.6, Z99.2] 11/08/2012 Not Applicable      Problems Resolved During this Admission:       Discharged Condition: fair    Disposition: Home-Health Care [facility]    Follow Up:   Follow-up Information     Schedule an appointment as soon as possible for a visit with [facility].    Why: Home Health  Contact information:  [address] [address]  [phone]             Dialysis. Schedule an appointment as soon as possible for a visit in 1 week.    Why: as scheduled           Carmela Ahumada MD. Schedule an appointment as soon as possible for a visit in 2 weeks.    Specialties: Pulmonary Disease, Critical Care Medicine  Contact information:  [address]  [address]  [place] [address]  [phone]                       Patient Instructions:      Ambulatory referral/consult to Home Health   Standing Status: Future   Referral Priority: Routine Referral Type: Home Health   Referral Reason: Specialty Services Required   Requested Specialty: Home Health Services   Number of Visits Requested: 1     Diet renal     Notify your health care provider if you experience any of the following:  temperature >100.4     Notify your health care provider if you experience any of the following:  persistent nausea and vomiting or diarrhea     Notify your health care provider if you experience any of the following:  severe uncontrolled pain     Notify your health care provider if you experience any of the following:  redness, tenderness, or signs of infection (pain, swelling, redness, odor or green/yellow discharge around incision site)     Activity as tolerated        Significant Diagnostic Studies: Labs:   CMP   Recent Labs   Lab 01/01/22  0450 01/02/22  0429    137   K 3.5 3.3*    103   CO2 21* 21*    94   BUN 8 19   CREATININE 2.3* 4.0*   CALCIUM 8.5* 8.4*   ALBUMIN 2.8* 2.6*   ANIONGAP 14 13   ESTGFRAFRICA 24* 12*   EGFRNONAA 21* 11*    and CBC   Recent Labs   Lab 01/01/22 0450 01/01/22 0450 01/02/22 0429   WBC 15.17*  --  11.94   HGB 9.0*  --  8.4*   HCT 30.4*   < > 28.3*     --  202    < > = values in this interval not displayed.       Pending Diagnostic Studies:     Procedure Component Value Units Date/Time    Cytology, Fluid/Wash/Brush [[id number]] Collected: 12/30/21 3606    Order Status: Sent Lab Status: In process Updated: 12/30/21 1751         Medications:  Reconciled Home Medications:      Medication List      START taking these medications    cephALEXin 250 MG capsule  Commonly known as: KEFLEX  Take 1 capsule (250 mg total) by mouth every 12 (twelve) hours. for 5 days        CONTINUE taking these medications    albuterol 90 mcg/actuation inhaler  Commonly known as: PROVENTIL/VENTOLIN HFA  Inhale 2 puffs into the lungs every 4 (four) hours as needed for Wheezing or Shortness of Breath. Rescue     albuterol-ipratropium 2.5 mg-0.5 mg/3 mL nebulizer solution  Commonly known as: DUO-NEB  Take 3 mLs by nebulization every 6 (six) hours while awake. Rescue     amLODIPine 10 MG tablet  Commonly known as: NORVASC  Take 10 mg by mouth once daily.     ANORO ELLIPTA 62.5-25 mcg/actuation Dsdv  Generic drug: umeclidinium-vilanteroL  Controller     aspirin 81 MG EC tablet  Commonly known as: ECOTRIN  Take 1 tablet (81 mg total) by mouth once daily.     atorvastatin 20 MG tablet  Commonly known as: LIPITOR  TAKE 1 TABLET (20 MG TOTAL) BY MOUTH ONCE DAILY.     butalbital-acetaminophen-caffeine -40 mg -40 mg per tablet  Commonly known as: FIORICET, ESGIC  Daily prn HA     calcium carbonate 600 mg calcium (1,500 mg) Tab  Commonly known  as: OS-UMAIR  Take 600 mg by mouth once daily.     clopidogreL 75 mg tablet  Commonly known as: PLAVIX  Take 1 tablet (75 mg total) by mouth once daily.     cyproheptadine 4 mg tablet  Commonly known as: PERIACTIN  Take 1 tablet (4 mg total) by mouth 2 (two) times daily.     fluticasone furoate-vilanteroL 100-25 mcg/dose diskus inhaler  Commonly known as: BREO  Inhale 1 puff into the lungs once daily. Controller     hydrALAZINE 25 MG tablet  Commonly known as: APRESOLINE  Take 1 tablet by mouth 2 (two) times daily.     meclizine 12.5 mg tablet  Commonly known as: ANTIVERT  Take 1 tablet (12.5 mg total) by mouth 3 (three) times daily as needed for Dizziness.     metoprolol succinate 50 MG 24 hr tablet  Commonly known as: TOPROL-XL  Take 1 tablet by mouth once daily.     nitroGLYCERIN 0.4 MG SL tablet  Commonly known as: NITROSTAT  Place 1 tablet (0.4 mg total) under the tongue every 5 (five) minutes as needed for Chest pain (x3 if not better to ER).     ondansetron 4 MG Tbdl  Commonly known as: ZOFRAN-ODT  Take 1 tablet (4 mg total) by mouth every 8 (eight) hours as needed (nausea).     pantoprazole 40 MG tablet  Commonly known as: PROTONIX  Take 1 tablet (40 mg total) by mouth 2 (two) times daily before meals.     paroxetine 20 MG tablet  Commonly known as: PAXIL  Take 1 tablet (20 mg total) by mouth every morning.     sodium bicarbonate 650 MG tablet  Take 2 tablets by mouth 2 (two) times a day.     sucralfate 1 gram tablet  Commonly known as: CARAFATE  TAKE ONE TABLET BY MOUTH THREE TIMES DAILY BEFORE MEALS AND BEDTIME     traMADoL 50 mg tablet  Commonly known as: ULTRAM  Take 1 tablet by mouth every 6 (six) hours as needed.            Indwelling Lines/Drains at time of discharge:   Lines/Drains/Airways     Drain                 Hemodialysis AV Fistula Left forearm -- days         Hemodialysis AV Fistula Left upper arm -- days         Chest Tube 12/30/21 1232 1 Right Pleural 2 days                Time spent on the  discharge of patient: 35 minutes         April Spain NP  Department of Hospital Medicine  [facility]

## 2022-01-02 NOTE — PLAN OF CARE
Patient cleared for discharge from case management standpoint.       01/02/22 0914   Final Note   Assessment Type Final Discharge Note   Anticipated Discharge Disposition Home-Health   Hospital Resources/Appts/Education Provided Post-Acute resouces added to AVS;Provided education on problems/symptoms using teachback;Appointments scheduled and added to AVS;Provided patient/caregiver with written discharge plan information

## 2022-01-03 NOTE — PROGRESS NOTES
C3 nurse attempted to contact patient. The following occurred:   C3 nurse attempted to contact Radha Jurado for a TCC post hospital discharge follow up call. The patient is unable to conduct the call @ this time. The patient requested a callback.    The patient does not have a scheduled HOSFU appointment within 7 days post hospital discharge date 1.2.21. Message sent to Physician staff to assist with HOSFU appointment scheduling.

## 2022-01-03 NOTE — PROGRESS NOTES
C3 nurse attempted to contact Radha Jurado for a TCC post hospital discharge follow up call. No answer. Left voicemail with callback information. The patient has a scheduled HOSFU appointment with Jeovany Carpio MD on 12.7.21 @ 0134.

## 2022-01-04 NOTE — PROGRESS NOTES
C3 nurse spoke with Radha Jurado for a TCC post hospital discharge follow up call. The patient has a scheduled \[facility] [place] [facility]\"" appointment with Jeovany Carpio MD on 1.7.21 @ 6544.

## 2022-01-04 NOTE — PROGRESS NOTES
C3 nurse attempted to contact Radha Jurado for a TCC post hospital discharge follow up call. The patient has a scheduled [facility] appointment with Jeovany Carpio MD on 12.7.21 @ 8588.

## 2022-01-04 NOTE — PATIENT INSTRUCTIONS
Patient Education       End Stage Kidney Disease Discharge Instructions   About this topic   End stage kidney disease is also called kidney failure. The kidneys are bean-shaped organs that are each about the size of your fist. They sit in the back of your belly, just above your waist, and filter your blood. The kidneys get rid of waste products and extra fluid from your body. The waste is turned into urine.     Sometimes, your kidneys do not work well. You are in kidney failure when your kidneys are no longer able to remove waste from your blood. You have a lot of damage to your kidneys. They are only working at 10 to 15 percent of what they normally would. This may happen all of a sudden or slowly over time.  What care is needed at home?   · Ask your doctor what you need to do when you go home. Make sure you ask questions if you do not understand what the doctor says. This way you will know what you need to do.  · If you have a trusted friend, family member, or significant other that can accompany you to your doctor visits, bring them along as well.  · You may need dialysis for the rest of your life or until you have a kidney transplant. Learn how to care for your catheter, shunt, graft, or fistula.  · Keep your legs above the level of your heart when in bed. This may help with your body's blood flow and lower swelling in your feet.  · Learn how to take your blood pressure.  ? Never take your blood pressure in the arm that has a catheter, fistula, shunt, or graft in it.  ? Write down each blood pressure reading and take these notes to your doctor.  ? Be sure you take your blood pressure drugs as ordered by your doctor.  ? Ask your doctor what a normal blood pressure is for you. Learn when you need to call the doctor for your blood pressure.  · Ask your doctor if you need to limit the amount of water and fluids you drink.  ? Extra fluid can make your kidneys have to work harder. You may also feel short of  breath.  ? Talk to your doctor about the right amount of fluid for you.  · Check your weight each day.  ? Be sure to weigh at the same time and wear the same amount of clothes.  ? Write down your weight each day and take these notes to your doctor.  ? Call your doctor if you gain 2 to 3 pounds (1 to 1.5 kg) and are not on dialysis.  What follow-up care is needed?   Your doctor may ask you to make visits to the office to check on your progress. Be sure to keep these visits. You may also need to have dialysis a few times each week.  What drugs may be needed?   The doctor may order drugs to:  · Lower blood pressure  · Control blood sugar levels  · Lower cholesterol  · Slow down kidney failure  · Get rid of extra water  · Control the electrolytes in your blood  · Prevent bone damage  · Help the body make more red blood cells  Will physical activity be limited?   Based on your illness, your activity may be limited. Talk with your doctor about the right amount of activity for you.  What changes to diet are needed?   Talk with your doctor or a dietitian about a diet plan that is right for you. Your doctor may want you to:  · Limit the amount of fluids in your diet.  · Eat a diet that is low in salt.  · Eat foods that are low in potassium. Some of these are apples, berries, grapes, peaches, green beans, cabbage, and lettuce.  · Talk to your doctor about how much potassium you can have in your diet. You may need to limit foods like oranges, avocados, bananas, fish, beans, and tomatoes.  · Eat foods that are low in phosphorus. Some of them are green peas, white bread, non-dairy creamer, and regular cheeses.  · Avoid foods that are high in phosphorous. Some of these are dairy foods, beans, nuts, peanut butter, hot dogs, and cola drinks.  · Eat foods that are high in protein. Eat more fish, meats, and eggs.  · Request a dietary consult if you need help adjusting to the required diet.  What problems could happen?    · Infection  · Bleeding  · Heart, brain, liver, lung problems  · High blood pressure  · Anemia  · Weakened bones  · Fluid buildup  · Seizures, confusion, or coma  What can be done to prevent this health problem?   Doing these things may help to keep your kidneys working better longer:  · Watch your salt intake.  · Eat low-fat foods like lean cuts of meat, fish, skinless chicken and turkey, legumes, and low-fat milk.  · Keep blood sugar levels under control if you have diabetes.  · Keep your blood pressure controlled.  · Be careful when taking over-the-counter pain drugs. Do not take ibuprofen, Aleve, or other drugs called NSAIDS without talking to your doctor first. Talk to your doctor about what drugs are safe for you.  · Keep a healthy weight. If you weigh too much, lose weight.  · Exercise more often.  · If you smoke, stop smoking.  · Limit or stop drinking beer, wine, and mixed drinks (alcohol).  · Keep your visits with your doctor to check your blood tests and blood pressure.     When do I need to call the doctor?   · Signs of infection. These include a fever of 100.4°F (38°C) or higher, chills, pain with passing urine.  · Very bad belly pain  · Swelling in your legs, ankles, and feet. Puffy eyelids and face, especially in the morning.  · Trouble breathing  · Passing less urine than normal or not able to pass urine  · Tired and have no energy  · Not hungry or losing weight but not trying to  · Upset stomach or throwing up  · Not able to sleep  · You cannot make it to your dialysis appointment  Teach Back: Helping You Understand   The Teach Back Method helps you understand the information we are giving you. After you talk with the staff, tell them in your own words what you learned. This helps to make sure the staff has described each thing clearly. It also helps to explain things that may have been confusing. Before going home, make sure you can do these:  · I can tell you about my condition.  · I can tell  you what changes I need to make with my diet, drugs, or activities.  · I can tell you what I will do if I have more swelling, pass less urine, or cannot make a dialysis appointment.  Where can I learn more?   Better [employer]  https://www.betterhealth.maxim.gov.au/health/ConditionsAndTreatments/kidney-disease   National Kidney Disease Education Program  https://www.niddk.nih.gov/health-information/kidney-disease/kidney-failure/all-content   [employer]  https://transplantliving.org/kidney/   Last Reviewed Date   2020-03-23  Consumer Information Use and Disclaimer   This information is not specific medical advice and does not replace information you receive from your health care provider. This is only a brief summary of general information. It does NOT include all information about conditions, illnesses, injuries, tests, procedures, treatments, therapies, discharge instructions or life-style choices that may apply to you. You must talk with your health care provider for complete information about your health and treatment options. This information should not be used to decide whether or not to accept your health care providers advice, instructions or recommendations. Only your health care provider has the knowledge and training to provide advice that is right for you.  Copyright   Copyright © 2021 UpToDate, Inc. and its affiliates and/or licensors. All rights reserved.    Ellie teaching reviewed with Radha Jurado . She verbalized understanding.    Education was provided based on the patient's discharge diagnosis using the attached Ellie patient education as a reference.

## 2022-01-06 NOTE — TELEPHONE ENCOUNTER
Tried to call patient. No answer.    ----- Message from Carmela Ahumada MD sent at 1/6/2022  5:04 PM CST -----  Fluid again did not show any cancer cells

## 2022-01-06 NOTE — PHYSICIAN QUERY
PT Name: Radha Jurado  MR #: [id number]     DOCUMENTATION CLARIFICATION     CDS/: April Naqvi RN              Contact information: [email or web address]  This form is a permanent document in the medical record.     Query Date: January 6, 2022    By submitting this query, we are merely seeking further clarification of documentation.  Please utilize your independent clinical judgment when addressing the question(s) below.    The Medical Record contains the following   Indicators Supporting Clinical Findings Location in Medical Record   x Heart Failure documented PMH:  Chronic Diastolic Heart Failure 12/29 HP       x BNP BNP= 3832 12/29 ED MD PN     x EF/Echo The estimated ejection fraction is 65%.  Grade I (mild) left ventricular diastolic dysfunction consistent with impaired relaxation     6/11/2020   x Radiology findings Continued large right pleural effusion with infiltrate and atelectasis in the right middle lobe and right lower lobe.    Right perihilar infiltrate remains     1. Persistent small right apical pneumothorax   2. Mild congestive heart failure which appears slightly increased over the interval.  3. Moderate right and small left pleural effusions with atelectasis versus infiltrate at the right lung base.   12/29 CXR      12/31 CXR         x Subjective/Objective Respiratory Conditions ED complaining of progressive shortness of breath over the past week.    She has missed her previous 5 hemodialysis appointments.     Shortness of breath  Tachypnea noted. She is in respiratory distress (mild). She has wheezes.     Rales present    Crackles on Right  Remains with Crackles R>L   12/29 ED MD PN          12/29 Renal MD PN    12/30 Pulm MD PN  12/31 Renal MD PN    Recent/Current MI      Heart Transplant, LVAD     x Edema, JVD JVD present 12/29 Renal MD PN      Ascites     x Diuretics/Meds Current outpatient medications:  Metoprolol 50mg Daily 12/29 HP       x Other Treatment fluid removal  with HD preferred method of volume management which should help keep the pleural effusion down as well    Hemodialysis  Renal consult  BNP  CXR  Cardiac monitor  Pulse oximetry   12/29 Pulm MD BRINK    12/29 NSG orders       x Other  She has missed her previous 5 hemodialysis appointments.  12/29 ED MD BRINK         Heart failure is a clinical diagnosis which includes symptomatic fluid retention, elevated intracardiac pressures, and/or the inability of the heart to deliver adequate blood flow.    Heart Failure with reduced Ejection Fraction (HFrEF) or Systolic Heart Failure (loses ability to contract normally, EF is <40%)    Heart Failure with preserved Ejection Fraction (HFpEF) or Diastolic Heart Failure (stiff ventricles, does not relax properly, EF is >50%)     Heart Failure with Combined Systolic and Diastolic Failure (stiff ventricles, does not relax properly and EF is <50%)    Mid-range or mildly reduced ejection fraction (HFmrEF) is classified as systolic heart failure.   Common clues to acute exacerbation:  Rapidly progressive symptoms (w/in 2 weeks of presentation), using IV diuretics, using supplemental O2, pulmonary edema on Xray, new or worsening pleural effusion, +JVD or other signs of volume overload, MI w/in 4 weeks, and/or BNP >500  The clinical guidelines noted are only system guidelines, and do not replace the providers clinical judgment.    Provider, please specify the diagnosis associated with the above clinical findings.    [ x  ]  Acute on Chronic Diastolic Heart Failure (HFpEF) -   worsening of CHF signs/symptoms in preexisting CHF     [   ]  Chronic Diastolic Heart Failure (HFpEF) - preexisting and stable     [   ]  Other (please specify): ___________________________________     [  ]  Clinically Undetermined       Please document in your progress notes daily for the duration of treatment until resolved and include in your discharge summary.    References:  American Heart Association editorial  staff. (2017, May). Ejection Fraction Heart Failure Measurement. American Heart Association. https://www.heart.org/en/health-topics/heart-failure/diagnosing-heart-failure/ejection-fraction-heart-failure-measurement#:~:text=Ejection%20fraction%20(EF)%20is%20a,pushed%20out%20with%20each%20heartbeat  WILMAR Granados (2020, December 15). Heart failure with preserved ejection fraction: Clinical manifestations and diagnosis. [employer]. https://www.jaeyos.[place]/contents/heart-failure-with-preserved-ejection-fraction-clinical-manifestations-and-diagnosis.  ICD-10-CM/PCS Coding Clinic Third Quarter ICD-10, Effective with discharges: September 8, 2020 [facility] § Heart failure with mid-range or mildly reduced ejection fraction (2020).  Form No. 14548    PT Name: Radha Jurado  MR #: [id number]

## 2022-01-06 NOTE — PHYSICIAN QUERY
PT Name: Radha Jurado  MR #: [id number]     DOCUMENTATION CLARIFICATION     CDS/: April Naqvi RN               Contact information: [email or web address]  This form is a permanent document in the medical record.     Query Date: January 6, 2022    By submitting this query, we are merely seeking further clarification of documentation.  Please utilize your independent clinical judgment when addressing the question(s) below.  The Medical Record contains the following   Indicators   Supporting Clinical Findings Location in Medical Record   x SOB, BLANCO, Wheezing, Productive Cough, Use of Accessory Muscles, etc. ED complaining of progressive shortness of breath over the past week.    She has missed her previous 5 hemodialysis appointments.      Shortness of breath  Tachypnea noted. She is in respiratory distress (mild). She has wheezes.      Rales present   JVD    Crackles on Right  Remains with Crackles R>L   12/29 ED MD PN              12/29 Renal MD PN       12/30 Pulm MD PN  12/31 Renal MD PN   x RR         ABGs         O2 sat O2 sat 87% room air---> O2 sat 94% on 2L NC    O2 sat 88% room air  O2 sat 88% room air---> O2 sat 99% on 4L NC   12/29 Flowsheet    12/30 Flowsheet      Hypoxia/Hypercapnia      BiPAP/Intubation/Mechanical Ventilation     x Supplemental O2 O2 4L NC 12/29 Flowsheet      Home O2, Oxygen Dependence     x Respiratory distress or failure Tachypnea noted. She is in respiratory distress (mild). 12/29 ED MD PN     x Radiology findings Continued large right pleural effusion with infiltrate and atelectasis in the right middle lobe and right lower lobe.  Right perihilar infiltrate remains     1. Persistent small right apical pneumothorax   2. Mild congestive heart failure which appears slightly increased over the interval.  3. Moderate right and small left pleural effusions with atelectasis versus infiltrate at the right lung base. 12/29 CXR        12/31 CXR    Acute/Chronic Illness      x Treatment fluid removal with HD preferred method of volume management which should help keep the pleural effusion down as well     Oxygen  Hemodialysis  Pulmonary consult  Pleuravax  BNP  CXR  Cardiac monitor  Pulse oximetry    12/29 Donta HERRERA PN     12/29 NSG orders   x Other complaining of progressive shortness of breath over the past week.    She has missed her previous 5 hemodialysis appointments  Patient volume overloaded with large right pleural effusion    PleurX ...The drain has drained approximately 800 cc so far.   12/29 ED MD PN        1/1 Pulm MD PN             The noted clinical guidelines are only system guidelines and do not replace the providers clinical judgment.    Provider, please specify the diagnosis or diagnoses associated with above clinical findings.     [    ] Acute Respiratory Failure with Hypoxia -   ABG pO2 < 60 mmHg or O2 sat of <91% on room air and respiratory symptoms documented    POA status:  (   ) yes   (   ) No  (   ) unable to determine     [    ] Hypoxia Only     [   x ] Other Respiratory Diagnosis (please specify): ______acute on chronic respiratory failure with hypxia (patient revealed she wore O2 at home)___________     [   ] Clinically Undetermined       Please document in your progress notes daily for the duration of treatment until resolved and include in your discharge summary.     Reference:    ALPA Figueroa MD. (2020, March 13). Acute respiratory distress syndrome: Clinical features, diagnosis, and complications in adults ([id number] NOEMY Mathur MD & [id number] SUSANNE Lord MD, Eds.). Retrieved November 13, 2020, from https://www.ChoiceStreamdate.com/contents/acute-respiratory-distress-syndrome-clinical-features-diagnosis-and-complications-in-adults?search=ards&source=search_result&selectedTitle=1~150&usage_type=default&display_rank=1  Form No. [id number]

## 2022-01-08 NOTE — PROGRESS NOTES
Subjective:       Patient ID: Radha Jurado is a 71 y.o. female.    Chief Complaint: Hospital Follow Up (Pneumonia - thoracentesis done on 12/30 (still has percutaneous tube on the right side) /) and Facial Swelling (Left side )    HPI   [facility] pleural effusion s/p catheter placement for weekly drainage and prn she is doing better with sob since able to drain pleural effusion she c/o some fluid on the face she c/o tired fatigue she is  etting dialyses x3 a week no fever chill coughing no cp BM is normal po diet adequate. She also felt at home in kitchen lost balance head hit wall c/o rt side neck pain rt upper shoulder pain  Review of Systems    Objective:      Physical Exam  Vitals and nursing note reviewed.   Constitutional:       General: She is not in acute distress.     Appearance: She is well-developed and well-nourished.      Comments: Thin skinny in wheel chair generalized weakness   HENT:      Head: Normocephalic and atraumatic.      Right Ear: External ear normal.      Left Ear: External ear normal.      Nose: Nose normal.      Mouth/Throat:      Mouth: Oropharynx is clear and moist.      Pharynx: No oropharyngeal exudate.   Eyes:      Extraocular Movements: Extraocular movements intact and EOM normal.      Conjunctiva/sclera: Conjunctivae normal.      Pupils: Pupils are equal, round, and reactive to light.   Neck:      Thyroid: No thyromegaly.   Cardiovascular:      Rate and Rhythm: Normal rate and regular rhythm.      Pulses: Intact distal pulses.      Heart sounds: Normal heart sounds. No murmur heard.  No friction rub. No gallop.    Pulmonary:      Effort: Pulmonary effort is normal. No respiratory distress.      Breath sounds: Normal breath sounds. No wheezing or rales.      Comments: Better BS RLL  Abdominal:      General: Bowel sounds are normal. There is no distension.      Palpations: Abdomen is soft.      Tenderness: There is no  abdominal tenderness. There is no guarding.   Musculoskeletal:         General: Tenderness (subjectiev etnderness rt lower CS and rt upper shoulder) present. No deformity or edema. Normal range of motion.      Cervical back: Normal range of motion and neck supple.   Lymphadenopathy:      Cervical: No cervical adenopathy.   Skin:     General: Skin is warm and dry.      Findings: No erythema or rash.      Comments: pale   Neurological:      Mental Status: She is alert and oriented to person, place, and time.   Psychiatric:         Mood and Affect: Mood and affect normal.         Thought Content: Thought content normal.         Judgment: Judgment normal.         Assessment:       1. Strain of neck muscle, initial encounter    2. Moderate malnutrition    3. Anemia, unspecified type    4. Chronic obstructive pulmonary disease, unspecified COPD type    5. ESRD on dialysis        Plan:       Strain of neck muscle, initial encounter  -     butalbital-acetaminophen-caffeine -40 mg (FIORICET, ESGIC) -40 mg per tablet; Take 1 tablet by mouth every 6 (six) hours as needed for Pain.  Dispense: 30 tablet; Refill: 0    Moderate malnutrition  -     TSH; Future; Expected date: 01/07/2022  -     T4, Free; Future; Expected date: 01/07/2022    Anemia, unspecified type  Comments:  repeat labs transfusion prn  Orders:  -     CBC Auto Differential; Future; Expected date: 01/07/2022  -     Comprehensive Metabolic Panel; Future; Expected date: 01/07/2022    Chronic obstructive pulmonary disease, unspecified COPD type  Comments:  pt needs uit smoking and contiue with tx    ESRD on dialysis  Comments:  continue with dialyses x3 a week        Medication List with Changes/Refills   New Medications    BUTALBITAL-ACETAMINOPHEN-CAFFEINE -40 MG (FIORICET, ESGIC) -40 MG PER TABLET    Take 1 tablet by mouth every 6 (six) hours as needed for Pain.   Current Medications    ALBUTEROL (PROVENTIL/VENTOLIN HFA) 90 MCG/ACTUATION  INHALER    Inhale 2 puffs into the lungs every 4 (four) hours as needed for Wheezing or Shortness of Breath. Rescue    ALBUTEROL-IPRATROPIUM (DUO-NEB) 2.5 MG-0.5 MG/3 ML NEBULIZER SOLUTION    Take 3 mLs by nebulization every 6 (six) hours while awake. Rescue    AMLODIPINE (NORVASC) 10 MG TABLET    Take 10 mg by mouth once daily.    ASPIRIN (ECOTRIN) 81 MG EC TABLET    Take 1 tablet (81 mg total) by mouth once daily.    ATORVASTATIN (LIPITOR) 20 MG TABLET    TAKE 1 TABLET (20 MG TOTAL) BY MOUTH ONCE DAILY.    AURYXIA 210 MG IRON TAB    Take 1 tablet by mouth once daily.    BUTALBITAL-ACETAMINOPHEN-CAFFEINE -40 MG (FIORICET, ESGIC) -40 MG PER TABLET    Daily prn HA    CALCIUM CARBONATE (OS-UMAIR) 600 MG (1,500 MG) TAB    Take 600 mg by mouth once daily.    CLOPIDOGREL (PLAVIX) 75 MG TABLET    Take 1 tablet (75 mg total) by mouth once daily.    CYPROHEPTADINE (PERIACTIN) 4 MG TABLET    Take 1 tablet (4 mg total) by mouth 2 (two) times daily.    FLUTICASONE FUROATE-VILANTEROL (BREO) 100-25 MCG/DOSE DISKUS INHALER    Inhale 1 puff into the lungs once daily. Controller    HYDRALAZINE (APRESOLINE) 25 MG TABLET    Take 1 tablet by mouth 2 (two) times daily.    MECLIZINE (ANTIVERT) 12.5 MG TABLET    Take 1 tablet (12.5 mg total) by mouth 3 (three) times daily as needed for Dizziness.    METOPROLOL SUCCINATE (TOPROL-XL) 50 MG 24 HR TABLET    Take 1 tablet by mouth once daily.    NITROGLYCERIN (NITROSTAT) 0.4 MG SL TABLET    Place 1 tablet (0.4 mg total) under the tongue every 5 (five) minutes as needed for Chest pain (x3 if not better to ER).    ONDANSETRON (ZOFRAN-ODT) 4 MG TBDL    Take 1 tablet (4 mg total) by mouth every 8 (eight) hours as needed (nausea).    PAROXETINE (PAXIL) 20 MG TABLET    Take 1 tablet (20 mg total) by mouth every morning.    SODIUM BICARBONATE 650 MG TABLET    Take 2 tablets by mouth 2 (two) times a day.    SUCRALFATE (CARAFATE) 1 GRAM TABLET    TAKE ONE TABLET BY MOUTH THREE TIMES DAILY  BEFORE MEALS AND BEDTIME    TRAMADOL (ULTRAM) 50 MG TABLET    Take 1 tablet by mouth every 6 (six) hours as needed.    UMECLIDINIUM-VILANTEROL (ANORO ELLIPTA) 62.5-25 MCG/ACTUATION DSDV    Controller   Discontinued Medications    PANTOPRAZOLE (PROTONIX) 40 MG TABLET    Take 1 tablet (40 mg total) by mouth 2 (two) times daily before meals.

## 2022-02-07 NOTE — TELEPHONE ENCOUNTER
"Spoke with pt. Herself "states I feel like death." Manolo f/u scheduled this Thursday 2/10 at 12:15 pm pt. Verbalized understanding   "

## 2022-02-07 NOTE — TELEPHONE ENCOUNTER
Call center attempted to transfer call to this RN, pts family member had hung up. verrified number with call  and  Attempted to call back x2 but no answer and VM was full so unable to leave message. Will route message.     Reason for Disposition   No answer.  First attempt to contact caller.  Follow-up call scheduled within 15 minutes.   Second attempt to contact caller AND no contact made. Phone number verified.    Protocols used: NO CONTACT OR DUPLICATE CONTACT CALL-A-OH

## 2022-02-07 NOTE — TELEPHONE ENCOUNTER
Called and spoke with patient.   Informed him that appointment for patient was scheduled. He v/u.    Also asked about removing fluid and if tahira could do in clinic. Informed patient and patient  she is out this week but that would have to be done at ED      ----- Message from Archana Birch sent at 2/7/2022  1:45 PM CST -----  Type: Patient Call Back         Who called:Patient          What is the request in detail: Patient  called in regards to patient having a tube suck in lung and its making (Wife )Mrs Jurado have trouble breathing and wanting to know if Dr Ahumada can remove tube ?          Can the clinic reply by [facility]?no          Would the patient rather a call back or a response via [facility]?call back          Best call back number:(mobile) [phone]         Additional Information:           Thank You

## 2022-02-11 NOTE — PROGRESS NOTES
Subjective:       Patient ID: Radha Jurado is a 71 y.o. female.    Chief Complaint: Hospital Follow Up and Shortness of Breath    HPI  Pt visit today for f/u from hospital she was admitted due to sob from COPD exacerbation and pleural effusion chronic rt effsion s/p catherter placement for frequent drainage with vacuum bottle effusion fluid study no malignancy pt also request refill meds pt is getting dialyse 3 x a week and H/H nurse 1 a week to drain pleural fluid   Review of Systems    Objective:      Physical Exam  Vitals and nursing note reviewed.   Constitutional:       General: She is not in acute distress.     Appearance: She is well-developed and well-nourished.   HENT:      Head: Normocephalic and atraumatic.      Right Ear: External ear normal.      Left Ear: External ear normal.      Nose: Nose normal.      Mouth/Throat:      Mouth: Oropharynx is clear and moist.      Pharynx: No oropharyngeal exudate.   Eyes:      Extraocular Movements: Extraocular movements intact and EOM normal.      Conjunctiva/sclera: Conjunctivae normal.      Pupils: Pupils are equal, round, and reactive to light.   Neck:      Thyroid: No thyromegaly.   Cardiovascular:      Rate and Rhythm: Normal rate and regular rhythm.      Pulses: Intact distal pulses.      Heart sounds: Normal heart sounds. No murmur heard.  No friction rub. No gallop.    Pulmonary:      Effort: Pulmonary effort is normal. No respiratory distress.      Breath sounds: Rales (rales and decrease BS LLL ) present. No wheezing.   Abdominal:      General: Bowel sounds are normal. There is no distension.      Palpations: Abdomen is soft.      Tenderness: There is no abdominal tenderness. There is no guarding.   Musculoskeletal:         General: No tenderness, deformity or edema. Normal range of motion.      Cervical back: Normal range of motion and neck supple.   Lymphadenopathy:      Cervical: No cervical adenopathy.   Skin:     General: Skin is warm and dry.       Findings: No erythema or rash.   Neurological:      Mental Status: She is alert and oriented to person, place, and time.      Comments: Generalized weakness wheel chair bounced    Psychiatric:         Mood and Affect: Mood and affect and mood normal.         Thought Content: Thought content normal.         Judgment: Judgment normal.         Assessment:       1. Need for vaccination for zoster    2. Headache, unspecified headache type    3. Coronary artery disease involving native coronary artery of native heart without angina pectoris    4. SOB (shortness of breath)    5. Pleural effusion, bilateral    6. ESRD on dialysis    7. Class 3 congestive heart failure, acute on chronic, diastolic    8. Anemia, unspecified type        Plan:       Need for vaccination for zoster  -     varicella-zoster gE-AS01B, PF, (SHINGRIX, PF,) 50 mcg/0.5 mL injection; Inject 0.5 mLs into the muscle once. for 1 dose  Dispense: 1 each; Refill: 0    Headache, unspecified headache type  -     butalbital-acetaminophen-caffeine -40 mg (FIORICET, ESGIC) -40 mg per tablet; Daily prn HA  Dispense: 30 tablet; Refill: 2    Coronary artery disease involving native coronary artery of native heart without angina pectoris  -     paroxetine (PAXIL) 20 MG tablet; Take 1 tablet (20 mg total) by mouth every morning.  Dispense: 90 tablet; Refill: 3    SOB (shortness of breath)  Comments:  new onset Left pleural effusion may need darianage tube on left side too pt ha sappt with pulmonary  Orders:  -     X-Ray Chest PA And Lateral; Future; Expected date: 02/10/2022    Pleural effusion, bilateral    ESRD on dialysis  -     Basic Metabolic Panel; Future; Expected date: 02/10/2022    Class 3 congestive heart failure, acute on chronic, diastolic  Comments:  with pleural effusion continu eiwth drainage dialyses and chest tube placement    Anemia, unspecified type  Comments:  s/p transfusion continue with Fe supplement  Orders:  -     CBC Auto  Differential; Future; Expected date: 02/10/2022        Medication List with Changes/Refills   New Medications    VARICELLA-ZOSTER GE-AS01B, PF, (SHINGRIX, PF,) 50 MCG/0.5 ML INJECTION    Inject 0.5 mLs into the muscle once. for 1 dose   Current Medications    ALBUTEROL (PROVENTIL/VENTOLIN HFA) 90 MCG/ACTUATION INHALER    Inhale 2 puffs into the lungs every 4 (four) hours as needed for Wheezing or Shortness of Breath. Rescue    ALBUTEROL-IPRATROPIUM (DUO-NEB) 2.5 MG-0.5 MG/3 ML NEBULIZER SOLUTION    Take 3 mLs by nebulization every 6 (six) hours while awake. Rescue    AMLODIPINE (NORVASC) 10 MG TABLET    Take 10 mg by mouth once daily.    ASPIRIN (ECOTRIN) 81 MG EC TABLET    Take 1 tablet (81 mg total) by mouth once daily.    ATORVASTATIN (LIPITOR) 20 MG TABLET    TAKE 1 TABLET (20 MG TOTAL) BY MOUTH ONCE DAILY.    AURYXIA 210 MG IRON TAB    Take 1 tablet by mouth once daily.    CALCIUM CARBONATE (OS-UMAIR) 600 MG (1,500 MG) TAB    Take 600 mg by mouth once daily.    CLOPIDOGREL (PLAVIX) 75 MG TABLET    Take 1 tablet (75 mg total) by mouth once daily.    CYPROHEPTADINE (PERIACTIN) 4 MG TABLET    Take 1 tablet (4 mg total) by mouth 2 (two) times daily.    FLUTICASONE FUROATE-VILANTEROL (BREO) 100-25 MCG/DOSE DISKUS INHALER    Inhale 1 puff into the lungs once daily. Controller    MECLIZINE (ANTIVERT) 12.5 MG TABLET    Take 1 tablet (12.5 mg total) by mouth 3 (three) times daily as needed for Dizziness.    METHOXY PEG-EPOETIN BETA (MIRCERA INJ)    225 mcg.    METOPROLOL SUCCINATE (TOPROL-XL) 50 MG 24 HR TABLET    Take 1 tablet by mouth once daily.    NITROGLYCERIN (NITROSTAT) 0.4 MG SL TABLET    Place 1 tablet (0.4 mg total) under the tongue every 5 (five) minutes as needed for Chest pain (x3 if not better to ER).    ONDANSETRON (ZOFRAN-ODT) 4 MG TBDL    Take 1 tablet (4 mg total) by mouth every 8 (eight) hours as needed (nausea).    SODIUM BICARBONATE 650 MG TABLET    Take 2 tablets by mouth 2 (two) times a day.     SUCRALFATE (CARAFATE) 1 GRAM TABLET    TAKE ONE TABLET BY MOUTH THREE TIMES DAILY BEFORE MEALS AND BEDTIME    TRAMADOL (ULTRAM) 50 MG TABLET    Take 1 tablet by mouth every 6 (six) hours as needed.    UMECLIDINIUM-VILANTEROL (ANORO ELLIPTA) 62.5-25 MCG/ACTUATION DSDV    Controller   Changed and/or Refilled Medications    Modified Medication Previous Medication    BUTALBITAL-ACETAMINOPHEN-CAFFEINE -40 MG (FIORICET, ESGIC) -40 MG PER TABLET butalbital-acetaminophen-caffeine -40 mg (FIORICET, ESGIC) -40 mg per tablet       Daily prn HA    Daily prn HA    PAROXETINE (PAXIL) 20 MG TABLET paroxetine (PAXIL) 20 MG tablet       Take 1 tablet (20 mg total) by mouth every morning.    Take 1 tablet (20 mg total) by mouth every morning.

## 2022-02-14 NOTE — TELEPHONE ENCOUNTER
Left message for dara to call back regarding patient...      ----- Message from Carmela Ahumada MD sent at 2/14/2022  4:34 PM CST -----  Pt needs pleurX bottles, please contact the rep Dara- [phone] for help ordering bottles for home    ----- Message -----  From: Yajaira Casillas LPN  Sent: 2/14/2022   2:34 PM CST  To: Carmela Ahumada MD    Before I call patient and inform him that y'all spoke on thoracentesis procedure done outpatient... wanted to make sure that y'all had not discussed something else or I just overlooked it.    ----- Message -----  From: Anni Sharma  Sent: 2/14/2022   2:08 PM CST  To: Brandyn Casey Staff    Type: Needs Medical Advice    Who Called: Tan Jurado (Spouse)  Best Call Back Number: [phone]  Additional Information: Calling to speak with the nurse about reordering the device that removes fluid from the patient's lungs

## 2022-02-23 NOTE — TELEPHONE ENCOUNTER
pleurX forms faxed over to [facility] on 2/16 and 2/23... fax: [phone]

## 2022-03-03 PROBLEM — J18.9 COMMUNITY ACQUIRED PNEUMONIA OF RIGHT LOWER LOBE OF LUNG: Status: RESOLVED | Noted: 2020-06-10 | Resolved: 2022-01-01

## 2022-03-03 PROBLEM — R05.9 COUGH: Status: RESOLVED | Noted: 2020-06-22 | Resolved: 2022-01-01

## 2022-03-03 PROBLEM — R09.02 HYPOXEMIA: Status: RESOLVED | Noted: 2020-06-22 | Resolved: 2022-01-01

## 2022-03-03 NOTE — PATIENT INSTRUCTIONS
Trelegy inhaler one puff daily  Continue home drainage pleurX catheter  Chest x-ray now  Developing pleural effusion on left now- discussed likely due to missed dialysis. Preferred management fluid removal with dialysis but can do procedures if needed

## 2022-03-03 NOTE — TELEPHONE ENCOUNTER
Called and spoke with [facility] to get pleurx bottles for patient. They state they cancelled the order bc patient insurance would not cover.    Informed them we never knew this. They state they called and spoke with patient .     Patient has appt tomorrow. Will discuss with dr hoffmann
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
## 2022-03-07 NOTE — TELEPHONE ENCOUNTER
----- Message from Carmela Ahumada MD sent at 3/7/2022 12:07 PM CST -----  Chest x-ray looks unchanged. Right catheter in good position, left pleural fluid moderate size.

## 2022-03-15 PROBLEM — Z51.5 COMFORT MEASURES ONLY STATUS: Status: ACTIVE | Noted: 2022-01-01

## 2022-03-15 PROBLEM — R79.89 ELEVATED TROPONIN: Status: ACTIVE | Noted: 2022-01-01

## 2022-03-15 PROBLEM — E16.2 HYPOGLYCEMIA: Status: ACTIVE | Noted: 2022-01-01

## 2022-03-15 PROBLEM — T07.XXXA WOUNDS, MULTIPLE: Status: ACTIVE | Noted: 2022-01-01

## 2022-03-15 NOTE — ED PROVIDER NOTES
Encounter Date: 3/15/2022       History     Chief Complaint   Patient presents with    Shortness of Breath     Pt has not gone to dialysis in a week, MWF rotation     Patient here via EMS reported altered mental status not attend dialysis in 1 week EMS reports patient's blood sugar was 20 they attempted oral glucose then administered 250 cc of D5 they reported vital signs were stable otherwise arrival emergency department patient is minimally responsive full only confused response to questions eyes open spontaneously her rectal temp is 94.6° were unable to obtain a pulse ox heart rate 72 blood pressure is 167/71 no further history available at this time        Review of patient's allergies indicates:   Allergen Reactions    Dulcolax [bisacodyl] Other (See Comments)     Sweating a lot, weakness    Vancomycin Itching    Docusate sodium      Other reaction(s): Makes sweat profusely; Weak    Dulcagen      Past Medical History:   Diagnosis Date    Carotid artery occlusion     Chronic diastolic CHF (congestive heart failure)     COPD (chronic obstructive pulmonary disease)     Coronary artery disease     Depression     End-stage renal disease on hemodialysis     History of gastric ulcer     Hyperlipidemia     Hypertension     Migraine headache     Myocardial infarction     Osteoarthritis     Peripheral artery disease      Past Surgical History:   Procedure Laterality Date    APPENDECTOMY  unknown    BLADDER SUSPENSION N/A 2005    with Mesh    CARDIAC CATHETERIZATION  2009    [facility] w/PCI [facility]    CAROTID ENDARTERECTOMY Right 6/1/2021    Procedure: ENDARTERECTOMY-CAROTID;  Surgeon: Joseph Bello MD;  Location: [facility] OR;  Service: Peripheral Vascular;  Laterality: Right;    CATHETERIZATION OF BOTH LEFT AND RIGHT HEART  06/25/2020    Right brachial accessed    CHOLECYSTECTOMY N/A 2017    COLONOSCOPY N/A 10/5/2020    Procedure: COLONOSCOPY;  Surgeon: Merlin Keller MD;  Location: [facility];   Service: Endoscopy;  Laterality: N/A;    CREATION OF AXILLARY-FEMORAL ARTERY BYPASS WITH GRAFT Right 8/20/2019    Procedure: CREATION, BYPASS, ARTERIAL, AXILLARY TO FEMORAL, USING GRAFT;  Surgeon: Joseph Bello MD;  Location: [facility] OR;  Service: Cardiovascular;  Laterality: Right;    ESOPHAGOGASTRODUODENOSCOPY N/A 10/5/2020    Procedure: EGD (ESOPHAGOGASTRODUODENOSCOPY);  Surgeon: Merlin Keller MD;  Location: [facility] ENDO;  Service: Endoscopy;  Laterality: N/A;    FISTULOGRAM Left 8/8/2019    Procedure: FISTULOGRAM;  Surgeon: Calixto Javed MD;  Location: [facility] CATH LAB;  Service: Nephrology;  Laterality: Left;    FISTULOGRAM Left 5/19/2020    Procedure: FISTULOGRAM;  Surgeon: Janell Delgado MD;  Location: [facility] CATH LAB;  Service: Nephrology;  Laterality: Left;    HYSTERECTOMY      INSERTION OF PLEURAL CATHETER Right 12/30/2021    Procedure: INSERTION, CATHETER, PLEURAL;  Surgeon: Tay Jernigan MD;  Location: [facility] OR;  Service: General;  Laterality: Right;    LEFT HEART CATHETERIZATION Right 6/25/2020    Procedure: Left heart cath;  Surgeon: Alex Mejía MD;  Location: [facility] CATH LAB;  Service: Cardiology;  Laterality: Right;  brachial access    LEFT HEART CATHETERIZATION Right 10/26/2020    Procedure: CATHETERIZATION, HEART, LEFT;  Surgeon: Lester De La Fuente MD;  Location: [facility] CATH LAB;  Service: Cardiology;  Laterality: Right;    NEPHRECTOMY Right 1977    OOPHORECTOMY      THORACENTESIS Right 12/30/2021    Procedure: Thoracentesis, VATS, Pleurex;  Surgeon: Tay Jernigan MD;  Location: [facility] OR;  Service: General;  Laterality: Right;     Family History   Problem Relation Age of Onset    Cancer Mother     Uterine cancer Mother     Hypertension Father     Heart attack Father     Heart disease Father     Heart failure Father     Cancer Father     Colon cancer Father      Social History     Tobacco Use    Smoking status: Light Tobacco Smoker     Packs/day: 0.25     Years: 50.00      Pack years: 12.50     Types: Cigarettes     Start date: 1962    Smokeless tobacco: Never Used    Tobacco comment: Do not smoke day of surgery   Substance Use Topics    Alcohol use: Not Currently     Comment: holidays    Drug use: No     Review of Systems   Unable to perform ROS: Mental status change       Physical Exam     Initial Vitals [03/15/22 0911]   BP Pulse Resp Temp SpO2   (!) 167/71 72 20 (!) 94.6 °F (34.8 °C) --      MAP       --         Physical Exam    Constitutional: She appears well-developed and well-nourished. She appears listless and lethargic. She appears distressed.   HENT:   Head: Normocephalic and atraumatic.   Oral mucosa dry   Eyes: EOM are normal. Pupils are equal, round, and reactive to light.   Neck:   Normal range of motion.  Cardiovascular: Normal rate, regular rhythm, normal heart sounds and intact distal pulses.   Pulmonary/Chest: She has rales.   Abdominal:   Diffuse tenderness to palpation with guarding   Musculoskeletal:         General: Normal range of motion.      Cervical back: Normal range of motion.      Comments: Wounds noted bilateral shins reviewed extensive bruising noted to the left upper extremity was swelling diffusely     Neurological: She appears lethargic and listless.   Confused verbal response eyes open to verbal stimulus   Skin: Skin is warm and dry. Capillary refill takes 2 to 3 seconds. There is pallor.         ED Course   Procedures  Labs Reviewed   CBC W/ AUTO DIFFERENTIAL - Abnormal; Notable for the following components:       Result Value    RDW 20.1 (*)     Platelets 102 (*)     Immature Granulocytes 1.2 (*)     Gran # (ANC) 11.4 (*)     Immature Grans (Abs) 0.15 (*)     Lymph # 0.2 (*)     nRBC 1 (*)     Gran % 90.6 (*)     Lymph % 1.2 (*)     All other components within normal limits   COMPREHENSIVE METABOLIC PANEL - Abnormal; Notable for the following components:    Sodium 132 (*)     Chloride 89 (*)     CO2 19 (*)     Glucose 164 (*)     BUN 37 (*)      Creatinine 6.3 (*)     Calcium 7.9 (*)     Total Protein 5.5 (*)     Albumin 2.1 (*)     Total Bilirubin 1.2 (*)      (*)     Anion Gap 24 (*)     eGFR if  7.0 (*)     eGFR if non  6.1 (*)     All other components within normal limits   TROPONIN I - Abnormal; Notable for the following components:    Troponin I 0.144 (*)     All other components within normal limits   B-TYPE NATRIURETIC PEPTIDE - Abnormal; Notable for the following components:    BNP 4,049 (*)     All other components within normal limits   LACTIC ACID, PLASMA - Abnormal; Notable for the following components:    Lactate (Lactic Acid) 5.7 (*)     All other components within normal limits    Narrative:     Lactic acid critical result(s) called and verbal readback obtained   from Ken Howell RN in ED by KS3 03/15/2022 10:51   POCT GLUCOSE - Abnormal; Notable for the following components:    POC Glucose 259 (*)     All other components within normal limits   ISTAT PROCEDURE - Abnormal; Notable for the following components:    POC PCO2 30.6 (*)     POC PO2 64 (*)     POC HCO3 18.5 (*)     POC SATURATED O2 92 (*)     POC TCO2 19 (*)     All other components within normal limits   CULTURE, RESPIRATORY   CULTURE, AEROBIC  (SPECIFY SOURCE)   SARS-COV-2 RNA AMPLIFICATION, QUAL   MAGNESIUM   DRUG SCREEN PANEL, URINE EMERGENCY   TSH   HEMOGLOBIN A1C   URINALYSIS, REFLEX TO URINE CULTURE   POCT GLUCOSE MONITORING CONTINUOUS        ECG Results          EKG 12-lead (In process)  Result time 03/15/22 11:11:35    In process by Interface, Lab In [facility] (03/15/22 11:11:35)                 Narrative:    Test Reason : R06.02,    Vent. Rate : 072 BPM     Atrial Rate : 072 BPM     P-R Int : 214 ms          QRS Dur : 072 ms      QT Int : 430 ms       P-R-T Axes : 054 060 042 degrees     QTc Int : 470 ms    Sinus rhythm with 1st degree A-V block  Low voltage QRS  Septal infarct ,age undetermined  Abnormal ECG  When compared with ECG  of 26-JAN-2022 15:49,  Significant changes have occurred    Referred By:             Confirmed By:                   In process by Interface, Lab In [facility] (03/15/22 11:10:52)                 Narrative:    Test Reason : R06.02,    Vent. Rate : 072 BPM     Atrial Rate : 072 BPM     P-R Int : 214 ms          QRS Dur : 072 ms      QT Int : 430 ms       P-R-T Axes : 054 060 042 degrees     QTc Int : 470 ms    Sinus rhythm with 1st degree A-V block  Low voltage QRS  Septal infarct ,age undetermined  Abnormal ECG  When compared with ECG of 26-JAN-2022 15:49,  Significant changes have occurred    Referred By:             Confirmed By:                   In process by Interface, Lab In [facility] (03/15/22 10:29:03)                 Narrative:    Test Reason : R06.02,    Vent. Rate : 072 BPM     Atrial Rate : 072 BPM     P-R Int : 214 ms          QRS Dur : 072 ms      QT Int : 430 ms       P-R-T Axes : 054 060 042 degrees     QTc Int : 470 ms    Sinus rhythm with 1st degree A-V block  Low voltage QRS  Septal infarct ,age undetermined  Abnormal ECG  When compared with ECG of 26-JAN-2022 15:49,  Significant changes have occurred    Referred By:             Confirmed By:                             Imaging Results          US Upper Extremity Arteries Left (Final result)  Result time 03/15/22 14:23:31    Final result by Nakul Vieira MD (03/15/22 14:23:31)                 Narrative:    Reason: Blue discoloration of left arm    COMPARISON: None    FINDINGS:  Grayscale, color Doppler, and spectral Doppler analysis was performed.    Left subclavian artery contains scattered calcified plaque without focal elevated peak systolic velocity to suggest a hemodynamically significant stenosis. Monophasic spectral waveforms occur in the left subclavian, axillary, and biphasic spectral waveform occurs in the left brachial artery. Slightly elevated peak systolic velocity in the proximal left brachial artery reaches 205 cm/s, not meeting  threshold criteria for hemodynamically significant stenosis.    Bandages about the left forearm preclude evaluation of the left radial and ulnar arteries.    IMPRESSION:  Patent visualized left upper extremity arteries, with atherosclerotic calcifications most prominent in left subclavian artery. Please note due to bandages, left radial and ulnar arteries are not evaluated.    Electronically signed by:  Nakul Vieira MD  3/15/2022 2:23 PM CDT Workstation: [id number]                             US Upper Extremity Veins Left (Final result)  Result time 03/15/22 14:17:59    Final result by Nakul Vieira MD (03/15/22 14:17:59)                 Narrative:    Reason: blue left arm    COMPARISON: None    FINDINGS:  Grayscale, color Doppler, and spectral Doppler analysis was performed.    Left internal jugular vein demonstrates normal compressibility and color and spectral Doppler flow. Left  subclavian vein demonstrates normal color and spectral Doppler flow.    Left axillary, brachial, and basilic veins demonstrate normal compressibility and color and spectral Doppler flow.      Bandages involving the left forearm preclude evaluation of the left radial and ulnar veins.    IMPRESSION:  Negative for venous thrombosis of left upper extremity.    Electronically signed by:  Nakul Vieira MD  3/15/2022 2:17 PM CDT Workstation: [id number]                             CT Abdomen Pelvis  Without Contrast (Final result)  Result time 03/15/22 10:29:15    Final result by Marian Navarro MD (03/15/22 10:29:15)                 Narrative:    CMS MANDATED QUALITY DATA - CT RADIATION - 436    All CT scans at this facility utilize dose modulation, iterative reconstruction, and/or weight based dosing when appropriate to reduce radiation dose to as low as reasonably achievable.    HISTORY: Shortness of breath, abdominal pain. Patient has skipped dialysis for one week    FINDINGS: Noncontrast axial images were obtained. Nonenhanced study is  tailored for the detection of urolithiasis, and is insensitive for abnormalities of the solid organs, vasculature and hollow viscera.    CT ABDOMEN: There is a moderate size left pleural effusion with compressive atelectasis in left lung base. There is a small right pleural effusion with increased interstitial markings and groundglass opacities in the right middle lobe and right lower lobe which may be secondary to edema versus pneumonia. There is a catheter within the right lung base.    The heart is enlarged. There is coronary artery calcification.    There is diffuse anasarca. There is a right axillary to femoral femoral bypass graft.    The liver, spleen and pancreas have a normal noncontrast appearance. Gallbladder is absent. The adrenal glands are unremarkable.    The right kidney is absent. The left kidney is small in size without hydronephrosis. There is moderate renal vascular calcification.    There is moderate degenerative disc disease and endplate changes at L4-5. There are no acute osseous abnormalities.    CT PELVIS: The bladder is collapsed. The uterus is absent. There is no pelvic adenopathy.    IMPRESSION: Moderate size left pleural effusion with compressive atelectasis left lung base and small right pleural effusion with groundglass and interstitial opacities in the right lower lobe which may be secondary to infection or edema. There is a catheter within the right lung base.    Moderate anasarca    Diffuse vascular calcification of the aorta and its branches    Absent right kidney with atrophic left kidney    Electronically signed by:  Marian Navarro MD  3/15/2022 10:29 AM CDT Workstation: [id number]                             CT Head Without Contrast (Final result)  Result time 03/15/22 10:22:50    Final result by Marian Navarro MD (03/15/22 10:22:50)                 Narrative:    CMS MANDATED QUALITY DATA - CT RADIATION  436    All CT scans at this facility utilize dose modulation,  iterative reconstruction, and/or weight based dosing when appropriate to reduce radiation dose to as low as reasonably achievable.  CT head without contrast    Clinical data: Altered mental status patient has skipped dialysis    FINDINGS: Noninfusion images were obtained from the skull base to the vertex. There is no intracranial mass, hemorrhage, or midline shift. Ventricles and sulci are mildly prominent. There are no pathologic extra-axial fluid collections. There is no evidence of cortical ischemic change. Changes of mild chronic microvascular white matter disease are noted. Cerebellum and brainstem are normal. The calvarium is intact.    IMPRESSION:    1. No acute intracranial abnormalities. Chronic findings as discussed above.    Electronically signed by:  Marian Navarro MD  3/15/2022 10:22 AM [employer] Workstation: [id number]                             X-Ray Chest AP Portable (Final result)  Result time 03/15/22 10:25:09    Final result by Umesh Lehman MD (03/15/22 10:25:09)                 Narrative:    Chest single view    CLINICAL DATA: Congestive heart failure    FINDINGS: AP view of the chest is compared to March 3, 2022.    The heart is mildly enlarged. The mediastinum is unremarkable. Large left pleural effusion is noted, increased in size compared to the prior exam. There is a small pleural effusion on the right, stable. Right-sided pleural drain is not significantly changed in position. There is no pneumothorax.    IMPRESSION:  1. Large left-sided pleural effusion, increased in size when compared to March 3.  2. Stable small right pleural effusion with right-sided chest tube/pleural catheter in place.    Electronically signed by:  Umesh Lehman MD  3/15/2022 10:25 AM [employer] Workstation: [id number]                               Medications   mupirocin 2 % ointment (has no administration in time range)   0.9%  NaCl infusion (has no administration in time range)   sodium chloride 0.9% bolus 250 mL  (has no administration in time range)   sodium chloride 0.9% flush 2 mL (has no administration in time range)   piperacillin-tazobactam 3.375 g in dextrose 5 % 50 mL IVPB (ready to mix system) (has no administration in time range)   doxycycline (VIBRAMYCIN) 100mg in dextrose 5% 250 mL IVPB (ready to mix) (has no administration in time range)   methylPREDNISolone sodium succinate injection 125 mg (has no administration in time range)   albuterol-ipratropium 2.5 mg-0.5 mg/3 mL nebulizer solution 3 mL (3 mLs Nebulization Not Given 3/15/22 1406)   albuterol inhaler 2 puff (has no administration in time range)   amLODIPine tablet 10 mg (has no administration in time range)   fluticasone furoate-vilanteroL 200-25 mcg/dose diskus inhaler 1 puff (1 puff Inhalation Not Given 3/15/22 1430)   aspirin EC tablet 325 mg (has no administration in time range)   aspirin chewable tablet 81 mg (has no administration in time range)   clopidogreL tablet 75 mg (has no administration in time range)   atorvastatin tablet 80 mg (has no administration in time range)   calcium carbonate 200 mg calcium (500 mg) chewable tablet 1,500 mg (has no administration in time range)   hydrALAZINE tablet 25 mg (has no administration in time range)   meclizine tablet 12.5 mg (has no administration in time range)   metoprolol succinate (TOPROL-XL) 24 hr tablet 50 mg (has no administration in time range)   nitroGLYCERIN SL tablet 0.4 mg (has no administration in time range)   pantoprazole EC tablet 40 mg (has no administration in time range)   paroxetine tablet 20 mg (has no administration in time range)   sodium bicarbonate tablet 1,300 mg (has no administration in time range)   cholecalciferol (vitamin D3) tablet 4,000 Units (has no administration in time range)   apixaban tablet 2.5 mg (has no administration in time range)   amiodarone tablet 100 mg (has no administration in time range)   meropenem-0.9% sodium chloride 1 g/50 mL IVPB (0 g Intravenous  Stopped 3/15/22 2023)     Medical Decision Making:   ED Management:  Patient here with altered mental status found to be hypothermic likely sepsis she does have a history of end-stage renal disease does not attend dialysis in the last week altered mental state station could be secondary to uremia CT scan of the head is unremarkable I have administered meropenem discussed patient's findings with Dr. Velasquez and the hospitalist will admit patient for further evaluation and treatment            Attending Attestation:         Attending Critical Care:   Critical Care Times:   Direct Patient Care (initial evaluation, reassessments, and time considering the case)................................................................13 minutes.   Additional History from reviewing old medical records or taking additional history from the family, EMS, PCP, etc.......................3 minutes.   Ordering, Reviewing, and Interpreting Diagnostic Studies...............................................................................................................8 minutes.   Documentation..................................................................................................................................................................................7 minutes.   Consultation with other Physicians. .................................................................................................................................................8 minutes.   ==============================================================  · Total Critical Care Time - exclusive of procedural time: 39 minutes.  ==============================================================                   Clinical Impression:   Final diagnoses:  [R06.02] SOB (shortness of breath)  [R06.02] Shortness of breath  [R60.9] Edema  [N18.6, Z99.2] End-stage renal disease needing dialysis (Primary)  [R41.82] Altered mental status, unspecified altered mental status  type  [A41.9] Sepsis, due to unspecified organism, unspecified whether acute organ dysfunction present  [J90] Pleural effusion          ED Disposition Condition    Admit               Brodie Moreno MD  03/15/22 2696

## 2022-03-15 NOTE — PROGRESS NOTES
03/15/22 1630   Handoff Report   Received From Shazia Nielsen RN   Given To Peter   Vital Signs   Temp 97 °F (36.1 °C)   Temp src Axillary   Pulse 74   Heart Rate Source Monitor   Resp 20   Flow (L/min) 2   O2 Device (Oxygen Therapy) nasal cannula   BP (!) 109/55        Hemodialysis AV Fistula Left upper arm   No Placement Date or Time found.   Present Prior to Hospital Arrival?: Yes  Size/Length: 16 G  Location: Left upper arm   Site Assessment Ecchymotic;Edematous;No warmth   Patency Present;Thrill;Bruit   Status Accessed   Flows Good   Dressing Intervention First dressing   Dressing Status Clean;Dry;Intact   Site Condition No complications   Dressing Gauze   During Hemodialysis Assessment   UF Removed (mL) 206 mL   Intra-Hemodialysis Comments tx complete   Post-Hemodialysis Assessment   Rinseback Volume (mL) 250 mL   Blood Volume Processed (Liters) 36 L   Dialyzer Clearance Lightly streaked   Duration of Treatment (minutes) 120 minutes   Hemodialysis Intake (mL) 1000 mL   Total UF (mL) 206 mL   Net Fluid Removal 794   Patient Response to Treatment did not tolerate well; very confused and agiatated   Post-Treatment Weight 47 kg (103 lb 9.9 oz)   Treatment Weight Change 0.3   Arterial bleeding stop time (min) 5 min   Venous bleeding stop time (min) 5 min   Post-Hemodialysis Comments tx complete

## 2022-03-15 NOTE — H&P
[facility] Medicine History & Physical Examination   Patient Name: Radha Jurado  MRN: [id number]  Patient Class: IP- Inpatient   Admission Date: 3/15/2022  9:03 AM  Length of Stay: 0  Attending Physician: Rupesh Howard MD  Primary Care Provider: Jeovany Carpio MD  Face-to-Face encounter date: 03/15/2022  Code Status: Full Code  MPOA:  Chief Complaint: Shortness of Breath (Pt has not gone to dialysis in a week, MWF rotation)        HISTORY OF PRESENT ILLNESS:   Radha Jurado is a 71 y.o. White female with known history of     Carotid occlusion s/p intervention (arterectomy, right side), chf, copd on 2 L NC at home, family and patient sttates that she uses it PRN when she feel out of breath, they say they have a oxymeter but it doesn't work, CAD s/p 2 stents about 15 years ago according to family, they actuaally deny CABG but our records show:status post multivessel CABG in the past and aorto- bifem bypass,  , depression, esrd on HD MWF, she missed her last 2 sesssions, HTN, tobacco smoker, active, got Clindamycin on 01/22 for cellulitis due to fall with wounds on lower extremities bilaterally and also wounds on upper extremities, left side, hep c, later on 01/22, she also got treatemnt for UTI with Cephalexin, at the time she reportadelly was on dialysis and had episode of hypotension and had allergic reaction to Vancomycin. She comes to ER today because usband states that since yesterday she is having episodes of altered mental sttuas, she becomes sleepy, confursed, incoherent, could not sleep well last night according to , has not eat for the past 2 days,  denies diabetes or insulin use at home, he did gave to her pain medication this morning because she was c/o abdominal apin and he aslo to see if it would help her to sleep, but he noticed that her consufion was nt improving, so he called EMS, once they arrived at her place her glycemia was 20, so she got 250 ml of  D5W, it is improved now.    Her ABG showed hypoxia, she had not being wearing her Oxygen at home, labs showed elevated lactic acid and troponnin. Patient states she had episode of retrosteranl chest pain earlier today, with no radiation, it is resvoled at this time.    In the ER she recevied IVF, Meropenem, Neprhology was consulted and willsee the patient.          REVIEW OF SYSTEMS:   10 Point Review of System was performed and was found to be negative except for that mentioned already in the HPI above.     PAST MEDICAL HISTORY:     Past Medical History:   Diagnosis Date    Carotid artery occlusion     Chronic diastolic CHF (congestive heart failure)     COPD (chronic obstructive pulmonary disease)     Coronary artery disease     Depression     End-stage renal disease on hemodialysis     History of gastric ulcer     Hyperlipidemia     Hypertension     Migraine headache     Myocardial infarction     Osteoarthritis     Peripheral artery disease        PAST SURGICAL HISTORY:     Past Surgical History:   Procedure Laterality Date    APPENDECTOMY  unknown    BLADDER SUSPENSION N/A 2005    with Mesh    CARDIAC CATHETERIZATION  2009    MI w/PCI @ [facility]    CAROTID ENDARTERECTOMY Right 6/1/2021    Procedure: ENDARTERECTOMY-CAROTID;  Surgeon: Joseph Bello MD;  Location: [facility] OR;  Service: Peripheral Vascular;  Laterality: Right;    CATHETERIZATION OF BOTH LEFT AND RIGHT HEART  06/25/2020    Right brachial accessed    CHOLECYSTECTOMY N/A 2017    COLONOSCOPY N/A 10/5/2020    Procedure: COLONOSCOPY;  Surgeon: Merlin Keller MD;  Location: [facility];  Service: Endoscopy;  Laterality: N/A;    CREATION OF AXILLARY-FEMORAL ARTERY BYPASS WITH GRAFT Right 8/20/2019    Procedure: CREATION, BYPASS, ARTERIAL, AXILLARY TO FEMORAL, USING GRAFT;  Surgeon: Joseph Bello MD;  Location: [facility] OR;  Service: Cardiovascular;  Laterality: Right;    ESOPHAGOGASTRODUODENOSCOPY N/A 10/5/2020    Procedure: EGD  (ESOPHAGOGASTRODUODENOSCOPY);  Surgeon: Merlin Keller MD;  Location: [facility] ENDO;  Service: Endoscopy;  Laterality: N/A;    FISTULOGRAM Left 8/8/2019    Procedure: FISTULOGRAM;  Surgeon: Calixto Javed MD;  Location: [facility] CATH LAB;  Service: Nephrology;  Laterality: Left;    FISTULOGRAM Left 5/19/2020    Procedure: FISTULOGRAM;  Surgeon: Janell Delgado MD;  Location: [facility] CATH LAB;  Service: Nephrology;  Laterality: Left;    HYSTERECTOMY      INSERTION OF PLEURAL CATHETER Right 12/30/2021    Procedure: INSERTION, CATHETER, PLEURAL;  Surgeon: Tay Jernigan MD;  Location: [facility] OR;  Service: General;  Laterality: Right;    LEFT HEART CATHETERIZATION Right 6/25/2020    Procedure: Left heart cath;  Surgeon: Alex Mejía MD;  Location: [facility] CATH LAB;  Service: Cardiology;  Laterality: Right;  brachial access    LEFT HEART CATHETERIZATION Right 10/26/2020    Procedure: CATHETERIZATION, HEART, LEFT;  Surgeon: Lester De La Fuente MD;  Location: [facility] CATH LAB;  Service: Cardiology;  Laterality: Right;    NEPHRECTOMY Right 1977    OOPHORECTOMY      THORACENTESIS Right 12/30/2021    Procedure: Thoracentesis, VATS, Pleurex;  Surgeon: Tay Jernigan MD;  Location: [facility] OR;  Service: General;  Laterality: Right;       ALLERGIES:   Dulcolax [bisacodyl], Vancomycin, Docusate sodium, and Dulcagen    FAMILY HISTORY:     Family History   Problem Relation Age of Onset    Cancer Mother     Uterine cancer Mother     Hypertension Father     Heart attack Father     Heart disease Father     Heart failure Father     Cancer Father     Colon cancer Father        SOCIAL HISTORY:     Social History     Tobacco Use    Smoking status: Light Tobacco Smoker     Packs/day: 0.25     Years: 50.00     Pack years: 12.50     Types: Cigarettes     Start date: 1962    Smokeless tobacco: Never Used    Tobacco comment: Do not smoke day of surgery   Substance Use Topics    Alcohol use: Not Currently     Comment: holidays         Social History     Substance and Sexual Activity   Sexual Activity Not Currently    Partners: Male        HOME MEDICATIONS:     Prior to Admission medications    Medication Sig Start Date End Date Taking? Authorizing Provider   albuterol (PROVENTIL/VENTOLIN HFA) 90 mcg/actuation inhaler Inhale 2 puffs into the lungs every 4 (four) hours as needed for Wheezing or Shortness of Breath. Rescue 9/9/20  Yes Jeovany Carpio MD   amLODIPine (NORVASC) 10 MG tablet Take 10 mg by mouth once daily.   Yes Historical Provider   aspirin (ECOTRIN) 81 MG EC tablet Take 1 tablet (81 mg total) by mouth once daily. 12/15/21 12/15/22 Yes Kelsey Galvez MD   atorvastatin (LIPITOR) 20 MG tablet TAKE 1 TABLET (20 MG TOTAL) BY MOUTH ONCE DAILY. 8/19/21 8/19/22 Yes Jeovany Carpio MD   AURYXIA 210 mg iron Tab Take 1 tablet by mouth once daily. 12/1/21  Yes Historical Provider   calcium carbonate (OS-UMAIR) 600 mg (1,500 mg) Tab Take 600 mg by mouth once daily.   Yes Historical Provider   cholecalciferol, vitamin D3, 125 mcg (5,000 unit) Tab Take 5,000 Units by mouth once daily.   Yes Historical Provider   clopidogreL (PLAVIX) 75 mg tablet Take 1 tablet (75 mg total) by mouth once daily. 12/15/21 12/15/22 Yes Kelsey Galvez MD   cyproheptadine (PERIACTIN) 4 mg tablet Take 1 tablet (4 mg total) by mouth 2 (two) times daily. 5/23/18  Yes Jeovany Carpio MD   fluticasone-umeclidin-vilanter (TRELEGY ELLIPTA) 100-62.5-25 mcg DsDv Inhale 1 puff into the lungs once daily. 3/3/22  Yes Carmela Ahumada MD   meclizine (ANTIVERT) 12.5 mg tablet Take 1 tablet (12.5 mg total) by mouth 3 (three) times daily as needed for Dizziness. 6/23/21  Yes Jeovany Carpio MD   metoprolol succinate (TOPROL-XL) 50 MG 24 hr tablet Take 1 tablet by mouth once daily. 11/14/20  Yes Historical Provider   nitroGLYCERIN (NITROSTAT) 0.4 MG SL tablet Place 1 tablet (0.4 mg total) under the tongue every 5 (five) minutes as needed for Chest pain (x3 if not better to ER).  "3/11/21 3/11/22 Yes Jeovany Carpio MD   ondansetron (ZOFRAN-ODT) 4 MG TbDL Take 1 tablet (4 mg total) by mouth every 8 (eight) hours as needed (nausea). 6/23/21  Yes Jeovany Carpio MD   pantoprazole (PROTONIX) 40 MG tablet Take 40 mg by mouth 2 (two) times daily.   Yes Historical Provider   paroxetine (PAXIL) 20 MG tablet Take 1 tablet (20 mg total) by mouth every morning. 2/10/22 11/1/22 Yes Jeovany Carpio MD   sodium bicarbonate 650 MG tablet Take 2 tablets by mouth 2 (two) times a day. 5/10/20  Yes Historical Provider   sucralfate (CARAFATE) 1 gram tablet TAKE ONE TABLET BY MOUTH THREE TIMES DAILY BEFORE MEALS AND BEDTIME 4/12/19  Yes Jeovany Carpio MD   traMADoL (ULTRAM) 50 mg tablet Take 1 tablet by mouth every 6 (six) hours as needed. 3/23/19  Yes Historical Provider   albuterol-ipratropium (DUO-NEB) 2.5 mg-0.5 mg/3 mL nebulizer solution Take 3 mLs by nebulization every 6 (six) hours while awake. Rescue 2/18/21 2/18/22  Jeovany Carpio MD   butalbital-acetaminophen-caffeine -40 mg (FIORICET, ESGIC) -40 mg per tablet Daily prn LOPES 2/10/22   Jeovany Carpio MD   methoxy peg-epoetin beta (MIRCERA INJ) 225 mcg. 1/31/22 1/30/23  Historical Provider     Eliquis 2.5 mg bid (They are not sure why)  Amiodarone 100 mg bid  Vitamin D 5 IU/day            PHYSICAL EXAM:   /67   Pulse 71   Temp (!) 94.6 °F (34.8 °C) (Rectal)   Resp 20   Ht 5' 1" (1.549 m)   Wt 46.7 kg (103 lb)   LMP  (LMP Unknown)   BMI 19.46 kg/m²   Vitals Reviewed  General appearance: female in no apparent distress.   Skin: No Rash. Patient has wouuns on LUE and lower extremities bilaterally.   Neuro: Motor and sensory exams grossly intact. Good tone. Eyes spontenaously open, follows commands, cooperative, awake and alert, oritneted times 2 not to time or situation.  HENT: Atraumatic head. Moist mucous membranes of oral cavity.  Eyes: Normal extraocular movements.   Neck: Supple. No evidence of lymphadenopathy. No " thyroidomegaly.  Lungs: Clear to auscultation bilaterally. No wheezing present.   Heart: Regular rate and rhythm. S1 and S2 present with no murmurs/gallop/rub. No pedal edema. No JVD present.   Abdomen: Soft, non-distended, non-tender. No rebound tenderness/guarding.Bowel sounds are normal. Bladder is not palpable.   Extremities: No cyanosis, clubbing.  Psych/mental status: Alert and oriented. Cooperative. Responds appropriately to questions.   EMERGENCY DEPARTMENT LABS AND IMAGING:     Labs Reviewed   CBC W/ AUTO DIFFERENTIAL - Abnormal; Notable for the following components:       Result Value    RDW 20.1 (*)     Platelets 102 (*)     Immature Granulocytes 1.2 (*)     Gran # (ANC) 11.4 (*)     Immature Grans (Abs) 0.15 (*)     Lymph # 0.2 (*)     nRBC 1 (*)     Gran % 90.6 (*)     Lymph % 1.2 (*)     All other components within normal limits   COMPREHENSIVE METABOLIC PANEL - Abnormal; Notable for the following components:    Sodium 132 (*)     Chloride 89 (*)     CO2 19 (*)     Glucose 164 (*)     BUN 37 (*)     Creatinine 6.3 (*)     Calcium 7.9 (*)     Total Protein 5.5 (*)     Albumin 2.1 (*)     Total Bilirubin 1.2 (*)      (*)     Anion Gap 24 (*)     eGFR if  7.0 (*)     eGFR if non  6.1 (*)     All other components within normal limits   TROPONIN I - Abnormal; Notable for the following components:    Troponin I 0.144 (*)     All other components within normal limits   B-TYPE NATRIURETIC PEPTIDE - Abnormal; Notable for the following components:    BNP 4,049 (*)     All other components within normal limits   LACTIC ACID, PLASMA - Abnormal; Notable for the following components:    Lactate (Lactic Acid) 5.7 (*)     All other components within normal limits    Narrative:     Lactic acid critical result(s) called and verbal readback obtained   from Ken Howell RN in ED by LINDEN 03/15/2022 10:51   POCT GLUCOSE - Abnormal; Notable for the following components:    POC Glucose 259  (*)     All other components within normal limits   ISTAT PROCEDURE - Abnormal; Notable for the following components:    POC PCO2 30.6 (*)     POC PO2 64 (*)     POC HCO3 18.5 (*)     POC SATURATED O2 92 (*)     POC TCO2 19 (*)     All other components within normal limits   CULTURE, BLOOD   CULTURE, BLOOD   CULTURE, RESPIRATORY   CULTURE, AEROBIC  (SPECIFY SOURCE)   SARS-COV-2 RNA AMPLIFICATION, QUAL   MAGNESIUM   DRUG SCREEN PANEL, URINE EMERGENCY   TSH   HEMOGLOBIN A1C   HEPATITIS B SURFACE ANTIGEN   URINALYSIS, REFLEX TO URINE CULTURE   LACTIC ACID, PLASMA   TROPONIN I   POCT GLUCOSE MONITORING CONTINUOUS       CT Abdomen Pelvis  Without Contrast   Final Result      CT Head Without Contrast   Final Result      X-Ray Chest AP Portable   Final Result      US Upper Extremity Arteries Left    (Results Pending)   US Upper Extremity Veins Left    (Results Pending)       ASSESSMENT & PLAN:   Radha Jurado is a 71 y.o. female admitted for    AMS likely due to encephalopathy, likely multifactorial ( Ho dialysys for 2 sessions, infectious process, hypoglycemia, medications, hypoxia)    ESRD with no HD for the past 2 sessions    Hypothermia in the ER and lactic acidosis possibly due to infections process, unclear source at this time    Hypoglycemia likely due to low PO intake    Hypoxia likely due to CRF, currenlty not using Oxygen at home, only PRN, likely needs it continuosly    CRF due to COPD    Mild THromobocytopenia, unclear etiology at this time possibly due to infections process    Worsening left pleural effusion likely ue to no hemodilaysis    Mildly elevated troponin, due to type 2 or typoe  Ischemia, ACS rule out    Rigt pleurex catheter    H.o. Fall on 01/2022 with wounds on lower extremities and lUE treated with antibitocs at the time (clindamycin)    CHRONC    CHF  COPD  CAD s/p 2 stets  Depression  ESRD on HD MWF  HTN  Chronic active smoker  UTI recently treated with cephalexin    PLAN:    Admit to  med/tele  Follow up renal regarding dialysis, input appreciated.  DOxyciline and Zosyn (patient allergic to Vancomycin and with mild thrombocytopenia at this time).  Follow up blood culture  Follow up UA/ Reflex urine culture. Sputum culture, wound culture, wound care contul  UDS  Troponin and lactic acid in 6 hours  Soluemdrol  mg now. SVNS q 4h X 3 and PRN.  A1c/ TSH  Cardiology consult  Continue with home mnedications, including Metorpolol, Atorvastatin (80 mg PO now), ELiquis 2.5 mg bid, amiodarone 100 mg bid, aspir 81 mg daily and plavix 75 mg daily, we will give aspirin po 325 mg now.        Diet:Renal and diabetic  DVT Prophylaxis: Eliquis and Encourage ambulation. OOB as tolerated.     Discharge Planning and Disposition:  Home with assistance from family    Expected LOS: 2 midnights    This patient is high risk for life-threatening deterioration and death secondary to above comorbidities and need for IV treatment. This patient meets inpatient criteria.   ________________________________________________________________________________    Face-to-Face encounter date: 03/15/2022  Encounter included review of the medical records, interviewing and examining the patient face-to-face, discussion with family and other health care providers including emergency medicine physician, admission orders, interpreting lab/test results and formulating a plan of care.   Medical Decision Making during this encounter was High Complexity due to Patient has a condition that poses threat to life.  ________________________________________________________________________________    INPATIENT LIST OF MEDICATIONS     Current Facility-Administered Medications:     0.9%  NaCl infusion, , Intravenous, Once, Robert Velasquez MD    albuterol inhaler 2 puff, 2 puff, Inhalation, Q8H **AND** MDI Q4H PRN, , , Q4H PRN, Rupesh Howard MD    albuterol-ipratropium 2.5 mg-0.5 mg/3 mL nebulizer solution 3 mL, 3 mL, Nebulization, Q6H,  Rupesh Howard MD    doxycycline (VIBRAMYCIN) 100mg in dextrose 5% 250 mL IVPB (ready to mix), 100 mg, Intravenous, Q12H, Rupesh Howard MD    heparin (porcine) injection 5,000 Units, 5,000 Units, Subcutaneous, Q8H, Rupesh Howard MD    methylPREDNISolone sodium succinate injection 125 mg, 125 mg, Intravenous, Once, Rupesh Howard MD    mupirocin 2 % ointment, , Nasal, BID, Robert Velasquez MD    piperacillin-tazobactam 3.375 g in dextrose 5 % 50 mL IVPB (ready to mix system), 3.375 g, Intravenous, Q12H, Rupesh Howard MD    sodium chloride 0.9% bolus 250 mL, 250 mL, Intravenous, PRN, Robert Velasquez MD    sodium chloride 0.9% flush 2 mL, 2 mL, Intravenous, Q6H PRN, Rupesh Howard MD    Current Outpatient Medications:     albuterol (PROVENTIL/VENTOLIN HFA) 90 mcg/actuation inhaler, Inhale 2 puffs into the lungs every 4 (four) hours as needed for Wheezing or Shortness of Breath. Rescue, Disp: 18 g, Rfl: 3    amLODIPine (NORVASC) 10 MG tablet, Take 10 mg by mouth once daily., Disp: , Rfl:     aspirin (ECOTRIN) 81 MG EC tablet, Take 1 tablet (81 mg total) by mouth once daily., Disp: 90 tablet, Rfl: 3    atorvastatin (LIPITOR) 20 MG tablet, TAKE 1 TABLET (20 MG TOTAL) BY MOUTH ONCE DAILY., Disp: 90 tablet, Rfl: 3    AURYXIA 210 mg iron Tab, Take 1 tablet by mouth once daily., Disp: , Rfl:     calcium carbonate (OS-UMAIR) 600 mg (1,500 mg) Tab, Take 600 mg by mouth once daily., Disp: , Rfl:     cholecalciferol, vitamin D3, 125 mcg (5,000 unit) Tab, Take 5,000 Units by mouth once daily., Disp: , Rfl:     clopidogreL (PLAVIX) 75 mg tablet, Take 1 tablet (75 mg total) by mouth once daily., Disp: 30 tablet, Rfl: 11    cyproheptadine (PERIACTIN) 4 mg tablet, Take 1 tablet (4 mg total) by mouth 2 (two) times daily., Disp: 60 tablet, Rfl: 1    fluticasone-umeclidin-vilanter (TRELEGY ELLIPTA) 100-62.5-25 mcg DsDv, Inhale 1 puff into the lungs once daily., Disp: 60 each, Rfl: 11    meclizine  (ANTIVERT) 12.5 mg tablet, Take 1 tablet (12.5 mg total) by mouth 3 (three) times daily as needed for Dizziness., Disp: 30 tablet, Rfl: 0    metoprolol succinate (TOPROL-XL) 50 MG 24 hr tablet, Take 1 tablet by mouth once daily., Disp: , Rfl:     nitroGLYCERIN (NITROSTAT) 0.4 MG SL tablet, Place 1 tablet (0.4 mg total) under the tongue every 5 (five) minutes as needed for Chest pain (x3 if not better to ER)., Disp: 25 tablet, Rfl: 1    ondansetron (ZOFRAN-ODT) 4 MG TbDL, Take 1 tablet (4 mg total) by mouth every 8 (eight) hours as needed (nausea)., Disp: 10 tablet, Rfl: 1    pantoprazole (PROTONIX) 40 MG tablet, Take 40 mg by mouth 2 (two) times daily., Disp: , Rfl:     paroxetine (PAXIL) 20 MG tablet, Take 1 tablet (20 mg total) by mouth every morning., Disp: 90 tablet, Rfl: 3    sodium bicarbonate 650 MG tablet, Take 2 tablets by mouth 2 (two) times a day., Disp: , Rfl:     sucralfate (CARAFATE) 1 gram tablet, TAKE ONE TABLET BY MOUTH THREE TIMES DAILY BEFORE MEALS AND BEDTIME, Disp: 90 tablet, Rfl: 3    traMADoL (ULTRAM) 50 mg tablet, Take 1 tablet by mouth every 6 (six) hours as needed., Disp: , Rfl:     albuterol-ipratropium (DUO-NEB) 2.5 mg-0.5 mg/3 mL nebulizer solution, Take 3 mLs by nebulization every 6 (six) hours while awake. Rescue, Disp: 90 mL, Rfl: 5    butalbital-acetaminophen-caffeine -40 mg (FIORICET, ESGIC) -40 mg per tablet, Daily prn HA, Disp: 30 tablet, Rfl: 2    methoxy peg-epoetin beta (MIRCERA INJ), 225 mcg., Disp: , Rfl:     Facility-Administered Medications Ordered in Other Encounters:     0.9%  NaCl infusion, , Intravenous, Continuous, Lester De La Fuente MD, Last Rate: 10 mL/hr at 10/26/20 1100, 10 mL/hr at 10/26/20 1100      Scheduled Meds:   sodium chloride 0.9%   Intravenous Once    albuterol  2 puff Inhalation Q8H    albuterol-ipratropium  3 mL Nebulization Q6H    doxycycline (VIBRAMYCIN) IVPB  100 mg Intravenous Q12H    heparin (porcine)  5,000 Units  Subcutaneous Q8H    methylPREDNISolone sodium succinate injection  125 mg Intravenous Once    mupirocin   Nasal BID    piperacillin-tazobactam (ZOSYN) IVPB  3.375 g Intravenous Q12H     Continuous Infusions:  PRN Meds:.sodium chloride 0.9%, sodium chloride 0.9%      Rupesh Howard  [facility] Hospitalist  03/15/2022

## 2022-03-15 NOTE — NURSING
Informed Dr. Velasquez of pt bilateral arms purple and red in color with some lower edema. Skin tear noted on left lower arm. Received orders to cannulate pt for HD.

## 2022-03-15 NOTE — CONSULTS
Nephrology Consult Note        Patient Name: Radha Jurado  MRN: [id number]    Patient Class: IP- Inpatient   Admission Date: 3/15/2022  Length of Stay: 0 days  Date of Service: 3/15/2022  Attending Physician: No att. providers found  Primary Care Provider: Jeovany Carpio MD    Reason for Consult: esrd/anemia/htn/shpt    SUBJECTIVE:     HPI: 71F with ESRD on HD, known to me from previous admission, is again readmitted with MS changes and skipping HD for about 1 week. Due to confusion, dialysis intervention was planned. Patient was started but could not tolerate much and dialysis was stopped prematurely.    Past Medical History:   Diagnosis Date    Carotid artery occlusion     Chronic diastolic CHF (congestive heart failure)     COPD (chronic obstructive pulmonary disease)     Coronary artery disease     Depression     End-stage renal disease on hemodialysis     History of gastric ulcer     Hyperlipidemia     Hypertension     Migraine headache     Myocardial infarction     Osteoarthritis     Peripheral artery disease      Past Surgical History:   Procedure Laterality Date    APPENDECTOMY  unknown    BLADDER SUSPENSION N/A 2005    with Mesh    CARDIAC CATHETERIZATION  2009    [facility] w/PCI [facility]    CAROTID ENDARTERECTOMY Right 6/1/2021    Procedure: ENDARTERECTOMY-CAROTID;  Surgeon: Joseph Bello MD;  Location: [facility] OR;  Service: Peripheral Vascular;  Laterality: Right;    CATHETERIZATION OF BOTH LEFT AND RIGHT HEART  06/25/2020    Right brachial accessed    CHOLECYSTECTOMY N/A 2017    COLONOSCOPY N/A 10/5/2020    Procedure: COLONOSCOPY;  Surgeon: Merlin Keller MD;  Location: [facility];  Service: Endoscopy;  Laterality: N/A;    CREATION OF AXILLARY-FEMORAL ARTERY BYPASS WITH GRAFT Right 8/20/2019    Procedure: CREATION, BYPASS, ARTERIAL, AXILLARY TO FEMORAL, USING GRAFT;  Surgeon: Joseph Bello MD;  Location: [facility] OR;  Service: Cardiovascular;  Laterality: Right;     ESOPHAGOGASTRODUODENOSCOPY N/A 10/5/2020    Procedure: EGD (ESOPHAGOGASTRODUODENOSCOPY);  Surgeon: Merlin Keller MD;  Location: [facility] ENDO;  Service: Endoscopy;  Laterality: N/A;    FISTULOGRAM Left 8/8/2019    Procedure: FISTULOGRAM;  Surgeon: Calixto Javed MD;  Location: [facility] CATH LAB;  Service: Nephrology;  Laterality: Left;    FISTULOGRAM Left 5/19/2020    Procedure: FISTULOGRAM;  Surgeon: Janell Delgado MD;  Location: [facility] CATH LAB;  Service: Nephrology;  Laterality: Left;    HYSTERECTOMY      INSERTION OF PLEURAL CATHETER Right 12/30/2021    Procedure: INSERTION, CATHETER, PLEURAL;  Surgeon: Tay Jernigan MD;  Location: [facility] OR;  Service: General;  Laterality: Right;    LEFT HEART CATHETERIZATION Right 6/25/2020    Procedure: Left heart cath;  Surgeon: Alex Mejía MD;  Location: [facility] CATH LAB;  Service: Cardiology;  Laterality: Right;  brachial access    LEFT HEART CATHETERIZATION Right 10/26/2020    Procedure: CATHETERIZATION, HEART, LEFT;  Surgeon: Lester De La Fuente MD;  Location: [facility] CATH LAB;  Service: Cardiology;  Laterality: Right;    NEPHRECTOMY Right 1977    OOPHORECTOMY      THORACENTESIS Right 12/30/2021    Procedure: Thoracentesis, VATS, Pleurex;  Surgeon: Tay Jernigan MD;  Location: [facility] OR;  Service: General;  Laterality: Right;     Family History   Problem Relation Age of Onset    Cancer Mother     Uterine cancer Mother     Hypertension Father     Heart attack Father     Heart disease Father     Heart failure Father     Cancer Father     Colon cancer Father      Social History     Tobacco Use    Smoking status: Light Tobacco Smoker     Packs/day: 0.25     Years: 50.00     Pack years: 12.50     Types: Cigarettes     Start date: 1962    Smokeless tobacco: Never Used    Tobacco comment: Do not smoke day of surgery   Substance Use Topics    Alcohol use: Not Currently     Comment: holidays    Drug use: No       Review of patient's allergies indicates:   Allergen  Reactions    Dulcolax [bisacodyl] Other (See Comments)     Sweating a lot, weakness    Vancomycin Itching    Docusate sodium      Other reaction(s): Makes sweat profusely; Weak    Dulcagen        Outpatient meds:  Current Facility-Administered Medications on File Prior to Encounter   Medication Dose Route Frequency Provider Last Rate Last Admin    0.9%  NaCl infusion   Intravenous Continuous Lester De La Fuente MD 10 mL/hr at 10/26/20 1100 10 mL/hr at 10/26/20 1100     Current Outpatient Medications on File Prior to Encounter   Medication Sig Dispense Refill    albuterol (PROVENTIL/VENTOLIN HFA) 90 mcg/actuation inhaler Inhale 2 puffs into the lungs every 4 (four) hours as needed for Wheezing or Shortness of Breath. Rescue 18 g 3    amLODIPine (NORVASC) 10 MG tablet Take 10 mg by mouth once daily.      aspirin (ECOTRIN) 81 MG EC tablet Take 1 tablet (81 mg total) by mouth once daily. 90 tablet 3    atorvastatin (LIPITOR) 20 MG tablet TAKE 1 TABLET (20 MG TOTAL) BY MOUTH ONCE DAILY. 90 tablet 3    AURYXIA 210 mg iron Tab Take 1 tablet by mouth once daily.      calcium carbonate (OS-UMAIR) 600 mg (1,500 mg) Tab Take 600 mg by mouth once daily.      cholecalciferol, vitamin D3, 125 mcg (5,000 unit) Tab Take 5,000 Units by mouth once daily.      clopidogreL (PLAVIX) 75 mg tablet Take 1 tablet (75 mg total) by mouth once daily. 30 tablet 11    cyproheptadine (PERIACTIN) 4 mg tablet Take 1 tablet (4 mg total) by mouth 2 (two) times daily. 60 tablet 1    fluticasone-umeclidin-vilanter (TRELEGY ELLIPTA) 100-62.5-25 mcg DsDv Inhale 1 puff into the lungs once daily. 60 each 11    meclizine (ANTIVERT) 12.5 mg tablet Take 1 tablet (12.5 mg total) by mouth 3 (three) times daily as needed for Dizziness. 30 tablet 0    metoprolol succinate (TOPROL-XL) 50 MG 24 hr tablet Take 1 tablet by mouth once daily.      nitroGLYCERIN (NITROSTAT) 0.4 MG SL tablet Place 1 tablet (0.4 mg total) under the tongue every 5 (five)  minutes as needed for Chest pain (x3 if not better to ER). 25 tablet 1    ondansetron (ZOFRAN-ODT) 4 MG TbDL Take 1 tablet (4 mg total) by mouth every 8 (eight) hours as needed (nausea). 10 tablet 1    pantoprazole (PROTONIX) 40 MG tablet Take 40 mg by mouth 2 (two) times daily.      paroxetine (PAXIL) 20 MG tablet Take 1 tablet (20 mg total) by mouth every morning. 90 tablet 3    sodium bicarbonate 650 MG tablet Take 2 tablets by mouth 2 (two) times a day.      sucralfate (CARAFATE) 1 gram tablet TAKE ONE TABLET BY MOUTH THREE TIMES DAILY BEFORE MEALS AND BEDTIME 90 tablet 3    traMADoL (ULTRAM) 50 mg tablet Take 1 tablet by mouth every 6 (six) hours as needed.      albuterol-ipratropium (DUO-NEB) 2.5 mg-0.5 mg/3 mL nebulizer solution Take 3 mLs by nebulization every 6 (six) hours while awake. Rescue 90 mL 5    butalbital-acetaminophen-caffeine -40 mg (FIORICET, ESGIC) -40 mg per tablet Daily prn HA 30 tablet 2    methoxy peg-epoetin beta (MIRCERA INJ) 225 mcg.         Scheduled meds:      Infusions:      PRN meds:      Review of Systems:  Review of Systems   Unable to perform ROS: Mental status change     OBJECTIVE:     Vital Signs and IO (Last 24H):  Temp:  [96.7 °F (35.9 °C)-97 °F (36.1 °C)]   Pulse:  [60-76]   Resp:  [17-22]   BP: ()/(41-93)   I/O last 3 completed shifts:  In: 1000 [Other:1000]  Out: 206 [Other:206]    Wt Readings from Last 5 Encounters:   03/15/22 45 kg (99 lb 3.3 oz)   02/10/22 46.7 kg (103 lb)   01/26/22 46.7 kg (103 lb)   01/07/22 39.8 kg (87 lb 10.1 oz)   12/30/21 41.3 kg (91 lb)     Physical Exam:  Physical Exam  Constitutional:       General: She is in acute distress.      Appearance: She is well-developed. She is ill-appearing. She is not diaphoretic.   HENT:      Head: Normocephalic and atraumatic.      Mouth/Throat:      Mouth: Mucous membranes are moist.   Eyes:      General: No scleral icterus.     Pupils: Pupils are equal, round, and reactive to light.    Cardiovascular:      Rate and Rhythm: Normal rate and regular rhythm.   Pulmonary:      Effort: Pulmonary effort is normal. No respiratory distress.      Breath sounds: No stridor.   Abdominal:      General: There is no distension.      Palpations: Abdomen is soft.   Musculoskeletal:         General: No deformity. Normal range of motion.      Cervical back: Neck supple.   Skin:     General: Skin is warm and dry.      Findings: Bruising, lesion and rash present. No erythema.   Neurological:      Mental Status: She is alert. She is disoriented.      Cranial Nerves: No cranial nerve deficit.         Body mass index is 18.74 kg/m².    Laboratory:  Recent Labs   Lab 03/15/22  0953   *   K 4.4   CL 89*   CO2 19*   BUN 37*   CREATININE 6.3*   ESTGFRAFRICA 7.0*   EGFRNONAA 6.1*   *       Recent Labs   Lab 03/15/22  0953   CALCIUM 7.9*   ALBUMIN 2.1*   MG 2.1       Recent Labs   Lab 12/30/21  1005   PTH, Intact 364.4 H       No results for input(s): POCTGLUCOSE in the last 168 hours.          Recent Labs   Lab 03/15/22  0953   WBC 12.60   HGB 12.7   HCT 39.5   *   MCV 93   MCHC 32.2   MONO 6.8  0.9       Recent Labs   Lab 03/15/22  0953   BILITOT 1.2*   PROT 5.5*   ALBUMIN 2.1*   ALKPHOS 130   ALT 23   *       Recent Labs   Lab 06/10/20  2153 06/22/20 2027 01/01/22  1115   Color, UA Yellow Yellow Yellow   Appearance, UA Clear Clear Clear   pH, UA >8.0 A 7.0 5.0   Specific Mobile, UA 1.015 1.015 >=1.030 A   Protein, UA 2+ A 2+ A 3+ A   Glucose, UA Trace A Negative Negative   Ketones, UA Negative Negative Trace A   Urobilinogen, UA Negative Negative Negative   Bilirubin (UA) Negative Negative 2+ A   Occult Blood UA 1+ A 1+ A Negative   Nitrite, UA Negative Negative Positive A   RBC, UA 4 2 1   WBC, UA 0 3 6 H   Bacteria Occasional Occasional Few A   Hyaline Casts, UA 0 0 0       Recent Labs   Lab 06/22/20  0548 06/25/20  0927 06/01/21  1503 09/21/21  2017 03/15/22  0940   POC PH 7.292 L  --   --   7.280 L 7.389   POC PCO2 38.5  --   --  39.5 30.6 L   POC HCO3 18.6 L  --   --  18.6 L 18.5 L   POC PO2 27 LL  --   --  17 L 64 L   POC SATURATED O2 43 L  --   --  20 L 92 L   POC BE -8  --   --  -8 -6   Sample VENOUS   < > ARTERIAL VENOUS ARTERIAL    < > = values in this interval not displayed.       Microbiology Results (last 7 days)     Procedure Component Value Units Date/Time    Blood culture #1 **CANNOT BE ORDERED STAT** [[id number]] Collected: 03/15/22 0953    Order Status: Completed Specimen: Blood from Peripheral, Upper Arm, Right Updated: 03/15/22 1717     Blood Culture, Routine No Growth to date    Blood culture #2 **CANNOT BE ORDERED STAT** [[id number]] Collected: 03/15/22 1022    Order Status: Completed Specimen: Blood from Peripheral, Upper Arm, Right Updated: 03/15/22 1717     Blood Culture, Routine No Growth to date    Aerobic culture [[id number]]     Order Status: Canceled Specimen: Skin from Leg, Left     Culture, Respiratory with Gram Stain [[id number]]     Order Status: Canceled Specimen: Respiratory         ASSESSMENT/PLAN:     ESRD on HD ? via AVF  AMS ? Uremia  Continue current dialysis prescription.  Next HD per schedule.  Renal diet - low K, low phos.  No IVs or BP checks on access and/or non-dominant arm.    Anemia of CKD  Hgb and HCT are acceptable. Monitor.  Will provide HANS and/or IV iron PRN.    MBD / Secondary HPT  Ca, phos, PTH and vitamin D levels are acceptable.   Phos binders, vitamin D analogues and calcimimetics as needed.    HTN  BP seem controlled.   Tolerate asymptomatic HTN up to -160.  Continue home meds.  Low sodium diet.    Thank you for allowing us to participate in the care of your patient!   We will follow the patient and provide recommendations as needed.    Patient care time was spent personally by me on the following activities:   · Obtaining a history.  · Examination of patient.  · Providing medical care at the patients bedside.  · Developing a treatment plan  with patient or surrogate and bedside caregivers.  · Ordering and reviewing laboratory studies, radiographic studies, pulse oximetry.  · Ordering and performing treatments and interventions.  · Evaluation of patient's response to treatment.  · Discussions with consultants while on the unit and immediately available to the patient.  · Re-evaluation of the patient's condition.  · Documentation in the medical record.     Robert Velasquez MD    [facility]  [address] [place] [id number] [phone] - tel  [phone] - fax    3/15/2022

## 2022-03-15 NOTE — ED NOTES
Repeat lactic acid not drawn due to no interventions have been done yet to correct this.  Patient is in need of dialysis to correct this.  Waiting for patient's turn to go to dialysis.  MD aware.

## 2022-03-15 NOTE — NURSING
Pt arrive to the floor around 6pm from dialysis. Pt confused picking at skin tears. Pt cleaned by staff. Pt  is at bed side. Dialysis nurse state removed 800ml had stopped dialysis 1hr early. Pt has to be redirected often. Pt has poor appetite. Alternate offered pt still refuses. Pt states not hungry. Pt in bed safe locked in lowest position will cont to observe

## 2022-03-16 NOTE — PROCEDURES
Called overhead to bedside of 71-year-old female in cardiac arrest receiving bag-valve mask ventilation.  Oropharynx was suctioned following vomitus of dark coffee-ground emesis, patient was intubated with 7-0 endotracheal tube without complication verified by chest x-ray, capnography and chest x-ray.    Endotracheal Intubation Procedure Note  Indication for endotracheal intubation: respiratory failure and cardiac arrest  Sedation: none  Paralytic: none  Lidocaine: no  Atropine: no  Equipment: Wen 4 laryngoscope blade and 7.0mm cuffed endotracheal tube  Cricoid Pressure: no  Number of attempts: 1  ETT location confirmed by by auscultation, by CXR and ETCO2 monitor.    Ameya Garcia DO  PGY-4 Emergency Medicine  12:51 AM

## 2022-03-16 NOTE — RESPIRATORY THERAPY
03/15/22 2023   Patient Assessment/Suction   Level of Consciousness (AVPU) alert   Respiratory Effort Normal;Unlabored   Expansion/Accessory Muscles/Retractions no use of accessory muscles;no retractions;expansion symmetric   All Lung Fields Breath Sounds Anterior:;Posterior:;diminished   Cough Type nonproductive   PRE-TX-O2   O2 Device (Oxygen Therapy) nasal cannula   $ Is the patient on Low Flow Oxygen? Yes   Flow (L/min) 3   SpO2   (unable to  SPO2. tried ear, fingers, and toe with multiple probes)   Pulse Oximetry Type Intermittent   $ Pulse Oximetry - Single Charge Pulse Oximetry - Single   Oximetry Probe Site Assessed;Intact;No Change Needed   Pulse 76   Resp 20   Aerosol Therapy   $ Aerosol Therapy Charges Aerosol Treatment   Daily Review of Necessity (SVN) completed   Respiratory Treatment Status (SVN) given   Treatment Route (SVN) mask;oxygen   Patient Position (SVN) HOB elevated   Post Treatment Assessment (SVN) breath sounds unchanged   Signs of Intolerance (SVN) none   Equipment Change   $ RT Equipment Treatment nebuilzer;Aerosol treatment mask   Education   $ Education Bronchodilator;15 min   Respiratory Evaluation   $ Care Plan Tech Time 15 min   $ Eval/Re-eval Charges Evaluation

## 2022-03-16 NOTE — DISCHARGE SUMMARY
[facility]  Discharge Summary      Patient Name: Radha Jurado  MRN: [id number]  Patient Class: IP- Inpatient  Admission Date: 3/15/2022  Hospital Length of Stay: 0 days  Discharge Date and Time:  03/15/2022 11:42 PM  Attending Physician: Rupesh Howard MD   Discharging Provider: Domonique Bernstein MD  Primary Care Provider: Jeovany Carpio MD      HPI:   No notes on file    * No surgery found *      Hospital Course:   3/15/2022  Pt under went code blue with ROS. She required intubation and her  felt that she had suffered enough and requested comfort care and extubation. Pt was noted to have blood in her NG tube. Her  felt that she would not want to live intubated and requested extubation. She was pronounced  at 11:35 PM       Goals of Care Treatment Preferences:  Code Status: DNR      Consults:   Consults (From admission, onward)        Status Ordering Provider     Inpatient consult to Cardiology  Once        Provider:  Lester De La Fuente MD    Acknowledged DOMONIQUE BERNSTEIN     IP consult to case management  Once        Provider:  (Not yet assigned)    Acknowledged DOMONIQUE BERNSTEIN     Inpatient consult to Cardiology  Once        Provider:  Ye Mckeon MD    Acknowledged DOMONIQUE BERNSTEIN     Inpatient consult to Hospitalist  Once        Provider:  Rupesh Howard MD    Acknowledged DOMONIQUE BERNSTEIN     Inpatient consult to Nephrology  Once        Provider:  Robert Velasquez MD    Completed AMMON KISER          No new Assessment & Plan notes have been filed under this hospital service since the last note was generated.  Service: Hospital Medicine    Final Active Diagnoses:    Diagnosis Date Noted POA    Wounds, multiple [T07.XXXA] 03/15/2022 Unknown    Hypoglycemia [E16.2] 03/15/2022 Unknown    Elevated troponin [R77.8] 03/15/2022 Unknown    Comfort measures only status [Z51.5] 03/15/2022 Not Applicable      Problems Resolved During this  Admission:       Discharged Condition:     Disposition:     Follow Up:    Patient Instructions:   No discharge procedures on file.    Significant Diagnostic Studies: Labs:   BMP:   Recent Labs   Lab 03/15/22  0953   *   *   K 4.4   CL 89*   CO2 19*   BUN 37*   CREATININE 6.3*   CALCIUM 7.9*   MG 2.1   , CMP   Recent Labs   Lab 03/15/22  0953   *   K 4.4   CL 89*   CO2 19*   *   BUN 37*   CREATININE 6.3*   CALCIUM 7.9*   PROT 5.5*   ALBUMIN 2.1*   BILITOT 1.2*   ALKPHOS 130   *   ALT 23   ANIONGAP 24*   ESTGFRAFRICA 7.0*   EGFRNONAA 6.1*   , CBC   Recent Labs   Lab 03/15/22  0953   WBC 12.60   HGB 12.7   HCT 39.5   *   , INR   Lab Results   Component Value Date    INR 1.0 2021    INR 1.0 2020    INR 1.0 10/23/2020   , Troponin   Recent Labs   Lab 03/15/22  0953 03/15/22  1533   TROPONINI 0.144* 0.181*    and All labs within the past 24 hours have been reviewed  Microbiology:   Blood Culture   Lab Results   Component Value Date    LABBLOO No Growth to date 03/15/2022    and Urine Culture    Lab Results   Component Value Date    LABURIN No growth 2022       Pending Diagnostic Studies:     Procedure Component Value Units Date/Time    EKG 12-lead [[id number]]     Order Status: Sent Lab Status: No result     Hepatitis B surface antigen [[id number]] Collected: 03/15/22 1535    Order Status: Sent Lab Status: In process Updated: 03/15/22 153    Specimen: Blood     Narrative:      Collection has been rescheduled by PAULA at 03/15/2022 15:09 Reason:   Patient unavailable         Medications:  None    Indwelling Lines/Drains at time of discharge:   Lines/Drains/Airways     Drain  Duration                Hemodialysis AV Fistula Left forearm -- days         Hemodialysis AV Fistula Left upper arm -- days                Time spent on the discharge of patient: 65 minutes         Mike Bernstein MD  Department of Hospital Medicine  [facility]

## 2022-03-16 NOTE — NURSING
During Code Blue, POCT glucose critical low post D50W due to poor circulation. Repeat Glucose from Iv read critical high.

## 2022-03-16 NOTE — PROGRESS NOTES
In the patient's room post extubation 11:35 PM  Pt did not respond to verbal or physical stimuli  No corneal reflexes  No respirations or heart tones    Pt pronounced  at 11:35 PM  Dr Howard is responsible for the death certificate

## 2022-03-16 NOTE — HOSPITAL COURSE
3/15/2022  Pt under went code blue with ROS. She required intubation and her  felt that she had suffered enough and requested comfort care and extubation. Pt was noted to have blood in her NG tube. Her  felt that she would not want to live intubated and requested extubation. She was pronounced  at 11:35 PM

## 2022-03-16 NOTE — NURSING
The pt arrived on the unit [id number] unable to explain why she is here. Pt is resting with  at bedside, very restless. Does not appear to be in distress at this time.

## 2022-03-16 NOTE — TELEPHONE ENCOUNTER
Spoke with pt.  MR. Maddox - expressed our condolences for the loss of his wife. Dr. Carpio also spoke with the  over the phone in regards to the pt.

## 2022-03-17 LAB
GLUCOSE SERPL-MCNC: 22 MG/DL (ref 70–110)
GLUCOSE SERPL-MCNC: 26 MG/DL (ref 70–110)
GLUCOSE SERPL-MCNC: 52 MG/DL (ref 70–110)
GLUCOSE SERPL-MCNC: <20 MG/DL (ref 70–110)
GLUCOSE SERPL-MCNC: >600 MG/DL (ref 70–110)
HBV SURFACE AG SERPL QL IA: NEGATIVE

## 2022-03-20 LAB
BACTERIA BLD CULT: NORMAL
BACTERIA BLD CULT: NORMAL

## 2022-03-21 NOTE — DISCHARGE SUMMARY
[facility]  Discharge Summary  Patient Name: Radha Jurado MRN: [id number]   Patient Class: IP- Inpatient  Length of Stay: 1   Admission Date: 3/15/2022  9:03 AM Attending Physician: Rupesh Howard MD   Primary Care Provider: Jeovany Carpio MD Face-to-Face encounter date: 2022   Chief Complaint: Shortness of Breath (Pt has not gone to dialysis in a week, MWF rotation)    Date of Discharge: 3/21/2022  Discharge Disposition:   Condition: Stable       Reason for Hospitalization   Encephalopathy    Patient Active Problem List   Diagnosis    Coronary artery disease    Carotid artery stenosis    Single kidney    Tobacco abuse    Hepatitis C    Alcohol abuse, in remission    Hyperlipidemia    Hypertension due to end stage renal disease on dialysis    Carotid artery disease    COPD (chronic obstructive pulmonary disease)    Moderate malnutrition    ESRD (end stage renal disease) on dialysis    ESRD on dialysis    End-stage renal disease on hemodialysis    Mechanical complication of arteriovenous fistula surgically created    Peripheral vascular disease, unspecified    Hypokalemia    Pleural effusion    Acute on chronic diastolic CHF (congestive heart failure)    Encounter for dietary consultation    Shortness of breath    Fluid overload    Carotid artery occlusion    Acute cystitis without hematuria    Wounds, multiple    Hypoglycemia    Elevated troponin    Comfort measures only status       Brief History of Present Illness    Radha Jurado is a 71 y.o. White female with known history of      Carotid occlusion s/p intervention (arterectomy, right side), chf, copd on 2 L NC at home, family and patient sttates that she uses it PRN when she feel out of breath, they say they have a oxymeter but it doesn't work, CAD s/p 2 stents about 15 years ago according to family, they actuaally deny CABG but our records show:status post multivessel CABG in the past and aorto-  bifem bypass,  , depression, esrd on HD MWF, she missed her last 2 sesssions, HTN, tobacco smoker, active, got Clindamycin on 01/22 for cellulitis due to fall with wounds on lower extremities bilaterally and also wounds on upper extremities, left side, hep c, later on 01/22, she also got treatemnt for UTI with Cephalexin, at the time she reportadelly was on dialysis and had episode of hypotension and had allergic reaction to Vancomycin. She comes to ER today because usband states that since yesterday she is having episodes of altered mental sttuas, she becomes sleepy, confursed, incoherent, could not sleep well last night according to , has not eat for the past 2 days,  denies diabetes or insulin use at home, he did gave to her pain medication this morning because she was c/o abdominal apin and he aslo to see if it would help her to sleep, but he noticed that her consufion was nt improving, so he called EMS, once they arrived at her place her glycemia was 20, so she got 250 ml of D5W, it is improved now.     Her ABG showed hypoxia, she had not being wearing her Oxygen at home, labs showed elevated lactic acid and troponnin. Patient states she had episode of retrosteranl chest pain earlier today, with no radiation, it is resvoled at this time.     In the ER she recevied IVF, Meropenem, Neprhology was consulted, she received DOxyciline and Zosyn (patient allergic to Vancomycin and with mild thrombocytopenia at this time).  Follow up blood culture  Follow up UA/ Reflex urine culture. Sputum culture, wound culture, wound care contul  UDS  Troponin and lactic acid in 6 hours  Soluemdrol  mg immediatelly. SVNS q 4h X 3 and PRN.  A1c/ TSH  Cardiology consult  Continue with home mnedications, including Metorpolol, Atorvastatin (80 mg PO now), ELiquis 2.5 mg bid, amiodarone 100 mg bid, aspir 81 mg daily and plavix 75 mg daily, we will give aspirin po 325 mg immediatelly. Nephrology took patient to  dialysis, she then returned to medical floor with telemetry after dialysis, hours later during the night she suddenly developed cardiac arrest, she had CPR and medications administered according to ACLS guidelines, was intubated, ROSC was achieve but family decided to do comfort measures only, patient then  later in the day.        Following labs were Reviewed   No results for input(s): WBC, HGB, HCT, PLT, GLUCOSE, CALCIUM, ALBUMIN, PROT, NA, K, CO2, CL, BUN, CREATININE, ALKPHOS, ALT, AST, BILITOT in the last 24 hours.  No results found for: POCTGLUCOSE     CMP No results for input(s): NA, K, CL, CO2, GLU, BUN, CREATININE, CALCIUM, PROT, ALBUMIN, BILITOT, ALKPHOS, AST, ALT, ANIONGAP, ESTGFRAFRICA, EGFRNONAA in the last 48 hours., CBC No results for input(s): WBC, HGB, HCT, PLT in the last 48 hours., Lipid Panel   Lab Results   Component Value Date    CHOL 75 (L) 2021    HDL 41 2021    LDLCALC 21.6 (L) 2021    TRIG 62 2021    CHOLHDL 54.7 (H) 2021    and Troponin   Recent Labs   Lab 03/15/22  0953 03/15/22  1533   TROPONINI 0.144* 0.181*       Microbiology Results (last 7 days)     Procedure Component Value Units Date/Time    Aerobic culture [[id number]]     Order Status: Canceled Specimen: Skin from Leg, Left     Culture, Respiratory with Gram Stain [[id number]]     Order Status: Canceled Specimen: Respiratory         X-Ray Chest AP Portable   Final Result      US Upper Extremity Arteries Left   Final Result      US Upper Extremity Veins Left   Final Result      CT Abdomen Pelvis  Without Contrast   Final Result      CT Head Without Contrast   Final Result      X-Ray Chest AP Portable   Final Result          No results found for this or any previous visit.      Consultants and Procedures   Consultants:  Dr. Velasquez (Nephrology)    Discharge Information:   Rupesh Howard  [facility] Hospitalist  2022

## 2022-05-02 ENCOUNTER — EXTERNAL HOME HEALTH (OUTPATIENT)
Dept: HOME HEALTH SERVICES | Facility: HOSPITAL | Age: 72
End: 2022-05-02
Payer: MEDICARE

## 2022-12-21 NOTE — PLAN OF CARE
Problem: Oral Intake Inadequate  Goal: Improved Oral Intake  Outcome: Ongoing (interventions implemented as appropriate)  Encourage PO intake of meals and supplements.        [General Appearance - Alert] : alert [General Appearance - In No Acute Distress] : in no acute distress [General Appearance - Well Nourished] : well nourished [General Appearance - Well Developed] : well developed [General Appearance - Well-Appearing] : healthy appearing [Delayed in the Right Toes] : capillary refills normal in right toes [Delayed in the Left Toes] : capillary refills normal in the left toes [3+] : left foot dorsalis pedis 3+ [de-identified] : pes planus \par \par flexible  [Skin Color & Pigmentation] : normal skin color and pigmentation [Skin Turgor] : normal skin turgor [] : no rash [Skin Lesions] : no skin lesions [Foot Ulcer] : no foot ulcer [Skin Induration] : no skin induration

## (undated) DEVICE — GLOVE BIOGEL MICRO SURGEON PINK SZ 6

## (undated) DEVICE — SUTURE MONOCRYL 3-0 27 PS-1 MCP936H

## (undated) DEVICE — SUTURE PROLENE 7-0 BV175-6 24 8735H

## (undated) DEVICE — KIT SHUNT CAROTID ARGYLE 577775

## (undated) DEVICE — GLOVE BIOGEL PI ORTHO PRO 7.5

## (undated) DEVICE — Device

## (undated) DEVICE — LOOP MINI BLUE 30-713

## (undated) DEVICE — PACK CUSTOM UNIV BASIN SLI

## (undated) DEVICE — DRAPE BILATERAL LEG 84X80

## (undated) DEVICE — SOLUTION SCRUB IODINE 4OZ

## (undated) DEVICE — SET MICPUNC ACC STIFF CANNULA

## (undated) DEVICE — DRESSING XEROFORM FOIL PK 1X8

## (undated) DEVICE — PAD BOVIE ADULT

## (undated) DEVICE — AGENT HEMOSTATIC ARISTA 3GR

## (undated) DEVICE — PREP CHLORA 26ML

## (undated) DEVICE — FLOWSWITCH HP 1-W W/O LL

## (undated) DEVICE — GLOVE BIOGEL SKINSENSE PI 8.5

## (undated) DEVICE — KIT ANTIFOG

## (undated) DEVICE — SEE MEDLINE ITEM 157117

## (undated) DEVICE — UNDERGLOVE BIOGEL PI MICRO BLUE SZ 7

## (undated) DEVICE — SOL WATER STRL IRR 1000ML

## (undated) DEVICE — APPLICATOR CHLORAPREP ORN 26ML

## (undated) DEVICE — GOWN SMART LRG 044673

## (undated) DEVICE — SEE MEDLINE ITEM 156918

## (undated) DEVICE — BAG DECANTER 10-102

## (undated) DEVICE — CLIP APPLIER MCS20 STERILMED ETHMCS20

## (undated) DEVICE — SOL 9P NACL IRR PIC IL

## (undated) DEVICE — STAPLER SKIN ROTATING HEAD WIDE PRW35

## (undated) DEVICE — SUT 3-0 VICRYL / SH (J416)

## (undated) DEVICE — LINER SUCTION 3000CC

## (undated) DEVICE — SPONGE GAUZE 4X8

## (undated) DEVICE — CLEANER TIP ELECSURG 2X2IN

## (undated) DEVICE — INSERT FOGARTY 33 SOFT33

## (undated) DEVICE — SUTURE MONOCRYL 2-0 CT-1 MCP945H

## (undated) DEVICE — ELECTRODE REM PLYHSV RETURN 9

## (undated) DEVICE — SYRINGE 20CC SLIP TIP 20ML 302831

## (undated) DEVICE — SLEEVE SCD EXPRESS KNEE MEDIUM

## (undated) DEVICE — DRAPE SPLIT CV 66350

## (undated) DEVICE — CONNECTOR TUBING STR 5 IN 1

## (undated) DEVICE — GLOVE #7.5 BIOGEL 30475

## (undated) DEVICE — SUTURE PROLENE 7-0 BV-1 30 8703H

## (undated) DEVICE — SUTURE PROLENE 5-0 BV-1 24 9702H

## (undated) DEVICE — TUBE LUKI ASP COLL 6 1/4

## (undated) DEVICE — SPONGE IV DRAIN 4X4 STERILE

## (undated) DEVICE — DRAPE IOBAN 13X13 6640EZ

## (undated) DEVICE — SUTURE ETHILON 2-0 PSLX 30 1697H

## (undated) DEVICE — DRAPE IOBAN 23X17 6650EZ

## (undated) DEVICE — SUTURE PROLENE 6-0 C-1 30 8706H

## (undated) DEVICE — SUTURE PROLENE 7-0 BV-1 24 8702H

## (undated) DEVICE — TROCAR THORACICI 10-12MM

## (undated) DEVICE — TUBING EASY SPRAY TISSEEL

## (undated) DEVICE — GLOVE BIOGEL SKINSENSE PI 7.0

## (undated) DEVICE — TRAY SKIN PREP DRY

## (undated) DEVICE — POUCH INSTRUMENT 1018

## (undated) DEVICE — GLOVE BIOGEL SKINSENSE PI 7.5

## (undated) DEVICE — GLOVE SURGEON SYN PF SZ 9

## (undated) DEVICE — LOOP MAXI RED 30-722

## (undated) DEVICE — KIT CATH/START PLEURX PLEURAL

## (undated) DEVICE — GLOVE SURG BIOGEL LTX PF ST SZ 6.5

## (undated) DEVICE — APPLICATOR CHLORAPREP CLR 10.5

## (undated) DEVICE — COVER LIGHT HANDLE LB53

## (undated) DEVICE — STAPLER SKIN ROTATING HEAD

## (undated) DEVICE — TRAY VASCULAR SLIDELL MEMORIAL

## (undated) DEVICE — GOWN SMART IMP BREATHABLE XXLG

## (undated) DEVICE — DRAPE LAPAROTOMY TRANSVERSE 89281

## (undated) DEVICE — SUCTION YANKAUER 34970

## (undated) DEVICE — CLIP APPLIER MSM20 STERILMED  ETHMSM20

## (undated) DEVICE — DRESSING TELFA 3X8  1238

## (undated) DEVICE — HEMOSTAT FIBRILLAR 2X4 1962

## (undated) DEVICE — DRAPE 3/4

## (undated) DEVICE — DRESSING POST OP MEPILEX AG 4X6  498300

## (undated) DEVICE — SOLUTION IRRI NS BOTTLE 1000ML R5200-01

## (undated) DEVICE — CLIP LIGACLIP LIGATING TITANIUM LG LT400

## (undated) DEVICE — BULB DRAIN 100CC 70740

## (undated) DEVICE — UNDERGLOVE BIOGEL PI MICRO BLUE SZ 6.5

## (undated) DEVICE — SUTURE SILK 0 18 A186H

## (undated) DEVICE — SYRINGE SAFETY 1ML TB 27GA X 1/2 305789

## (undated) DEVICE — SOLUTION PREP IODINE 4OZ

## (undated) DEVICE — SUT CTD VICRYL 4-0 BR PS-2

## (undated) DEVICE — TUBING CONTRAST INJCTN F-M 10

## (undated) DEVICE — PACK BASIC

## (undated) DEVICE — SPONGE GAUZE 10S 4X4  442214

## (undated) DEVICE — PACK DRAPE UNIVERSAL CONVERTOR

## (undated) DEVICE — CHLORAPREP 10.5 ML APPLICATOR

## (undated) DEVICE — TOWEL OR BLUE      MDT2168284

## (undated) DEVICE — TAPE MEDIPORE 3 X 10YD

## (undated) DEVICE — SUTURE SILK 2-0 PS 18 1588H

## (undated) DEVICE — TOWEL OR WHITE